# Patient Record
Sex: FEMALE | Race: WHITE | NOT HISPANIC OR LATINO | URBAN - METROPOLITAN AREA
[De-identification: names, ages, dates, MRNs, and addresses within clinical notes are randomized per-mention and may not be internally consistent; named-entity substitution may affect disease eponyms.]

---

## 2017-08-01 PROBLEM — Z00.00 ENCOUNTER FOR PREVENTIVE HEALTH EXAMINATION: Status: ACTIVE | Noted: 2017-08-01

## 2024-09-19 ENCOUNTER — INPATIENT (INPATIENT)
Facility: HOSPITAL | Age: 79
LOS: 5 days | Discharge: HOME | DRG: 690 | End: 2024-09-25
Attending: STUDENT IN AN ORGANIZED HEALTH CARE EDUCATION/TRAINING PROGRAM | Admitting: STUDENT IN AN ORGANIZED HEALTH CARE EDUCATION/TRAINING PROGRAM
Payer: MEDICARE

## 2024-09-19 VITALS
RESPIRATION RATE: 16 BRPM | HEART RATE: 95 BPM | HEIGHT: 65 IN | OXYGEN SATURATION: 98 % | TEMPERATURE: 100 F | WEIGHT: 194.01 LBS | SYSTOLIC BLOOD PRESSURE: 120 MMHG | DIASTOLIC BLOOD PRESSURE: 83 MMHG

## 2024-09-19 DIAGNOSIS — L98.9 DISORDER OF THE SKIN AND SUBCUTANEOUS TISSUE, UNSPECIFIED: ICD-10-CM

## 2024-09-19 DIAGNOSIS — M10.9 GOUT, UNSPECIFIED: ICD-10-CM

## 2024-09-19 DIAGNOSIS — E78.5 HYPERLIPIDEMIA, UNSPECIFIED: ICD-10-CM

## 2024-09-19 DIAGNOSIS — Z29.9 ENCOUNTER FOR PROPHYLACTIC MEASURES, UNSPECIFIED: ICD-10-CM

## 2024-09-19 DIAGNOSIS — Z79.899 OTHER LONG TERM (CURRENT) DRUG THERAPY: ICD-10-CM

## 2024-09-19 DIAGNOSIS — N39.0 URINARY TRACT INFECTION, SITE NOT SPECIFIED: ICD-10-CM

## 2024-09-19 DIAGNOSIS — I10 ESSENTIAL (PRIMARY) HYPERTENSION: ICD-10-CM

## 2024-09-19 DIAGNOSIS — A41.9 SEPSIS, UNSPECIFIED ORGANISM: ICD-10-CM

## 2024-09-19 DIAGNOSIS — E11.9 TYPE 2 DIABETES MELLITUS WITHOUT COMPLICATIONS: ICD-10-CM

## 2024-09-19 LAB
ALBUMIN SERPL ELPH-MCNC: 3.6 G/DL — SIGNIFICANT CHANGE UP (ref 3.3–5)
ALP SERPL-CCNC: 121 U/L — HIGH (ref 40–120)
ALT FLD-CCNC: 26 U/L — SIGNIFICANT CHANGE UP (ref 12–78)
ANION GAP SERPL CALC-SCNC: 8 MMOL/L — SIGNIFICANT CHANGE UP (ref 5–17)
APPEARANCE UR: CLEAR — SIGNIFICANT CHANGE UP
APTT BLD: 34.1 SEC — SIGNIFICANT CHANGE UP (ref 24.5–35.6)
AST SERPL-CCNC: 11 U/L — LOW (ref 15–37)
BASOPHILS # BLD AUTO: 0.05 K/UL — SIGNIFICANT CHANGE UP (ref 0–0.2)
BASOPHILS NFR BLD AUTO: 0.4 % — SIGNIFICANT CHANGE UP (ref 0–2)
BILIRUB SERPL-MCNC: 0.9 MG/DL — SIGNIFICANT CHANGE UP (ref 0.2–1.2)
BILIRUB UR-MCNC: NEGATIVE — SIGNIFICANT CHANGE UP
BUN SERPL-MCNC: 26 MG/DL — HIGH (ref 7–23)
CALCIUM SERPL-MCNC: 9.8 MG/DL — SIGNIFICANT CHANGE UP (ref 8.5–10.1)
CHLORIDE SERPL-SCNC: 104 MMOL/L — SIGNIFICANT CHANGE UP (ref 96–108)
CO2 SERPL-SCNC: 26 MMOL/L — SIGNIFICANT CHANGE UP (ref 22–31)
COLOR SPEC: YELLOW — SIGNIFICANT CHANGE UP
CREAT SERPL-MCNC: 1.4 MG/DL — HIGH (ref 0.5–1.3)
DIFF PNL FLD: NEGATIVE — SIGNIFICANT CHANGE UP
EGFR: 39 ML/MIN/1.73M2 — LOW
EOSINOPHIL # BLD AUTO: 0.03 K/UL — SIGNIFICANT CHANGE UP (ref 0–0.5)
EOSINOPHIL NFR BLD AUTO: 0.2 % — SIGNIFICANT CHANGE UP (ref 0–6)
GLUCOSE SERPL-MCNC: 210 MG/DL — HIGH (ref 70–99)
GLUCOSE UR QL: >=1000 MG/DL
HCT VFR BLD CALC: 37.4 % — SIGNIFICANT CHANGE UP (ref 34.5–45)
HGB BLD-MCNC: 11.8 G/DL — SIGNIFICANT CHANGE UP (ref 11.5–15.5)
IMM GRANULOCYTES NFR BLD AUTO: 0.6 % — SIGNIFICANT CHANGE UP (ref 0–0.9)
INR BLD: 1.04 RATIO — SIGNIFICANT CHANGE UP (ref 0.85–1.18)
KETONES UR-MCNC: NEGATIVE MG/DL — SIGNIFICANT CHANGE UP
LACTATE SERPL-SCNC: 1.3 MMOL/L — SIGNIFICANT CHANGE UP (ref 0.7–2)
LEUKOCYTE ESTERASE UR-ACNC: NEGATIVE — SIGNIFICANT CHANGE UP
LYMPHOCYTES # BLD AUTO: 0.91 K/UL — LOW (ref 1–3.3)
LYMPHOCYTES # BLD AUTO: 6.8 % — LOW (ref 13–44)
MCHC RBC-ENTMCNC: 27.3 PG — SIGNIFICANT CHANGE UP (ref 27–34)
MCHC RBC-ENTMCNC: 31.6 GM/DL — LOW (ref 32–36)
MCV RBC AUTO: 86.4 FL — SIGNIFICANT CHANGE UP (ref 80–100)
MONOCYTES # BLD AUTO: 1.31 K/UL — HIGH (ref 0–0.9)
MONOCYTES NFR BLD AUTO: 9.8 % — SIGNIFICANT CHANGE UP (ref 2–14)
NEUTROPHILS # BLD AUTO: 10.95 K/UL — HIGH (ref 1.8–7.4)
NEUTROPHILS NFR BLD AUTO: 82.2 % — HIGH (ref 43–77)
NITRITE UR-MCNC: POSITIVE
NRBC # BLD: 0 /100 WBCS — SIGNIFICANT CHANGE UP (ref 0–0)
PH UR: 5.5 — SIGNIFICANT CHANGE UP (ref 5–8)
PLATELET # BLD AUTO: 229 K/UL — SIGNIFICANT CHANGE UP (ref 150–400)
POTASSIUM SERPL-MCNC: 4.3 MMOL/L — SIGNIFICANT CHANGE UP (ref 3.5–5.3)
POTASSIUM SERPL-SCNC: 4.3 MMOL/L — SIGNIFICANT CHANGE UP (ref 3.5–5.3)
PROT SERPL-MCNC: 7.7 G/DL — SIGNIFICANT CHANGE UP (ref 6–8.3)
PROT UR-MCNC: 30 MG/DL
PROTHROM AB SERPL-ACNC: 11.8 SEC — SIGNIFICANT CHANGE UP (ref 9.5–13)
RAPID RVP RESULT: SIGNIFICANT CHANGE UP
RBC # BLD: 4.33 M/UL — SIGNIFICANT CHANGE UP (ref 3.8–5.2)
RBC # FLD: 15.7 % — HIGH (ref 10.3–14.5)
SARS-COV-2 RNA SPEC QL NAA+PROBE: SIGNIFICANT CHANGE UP
SODIUM SERPL-SCNC: 138 MMOL/L — SIGNIFICANT CHANGE UP (ref 135–145)
SP GR SPEC: 1.02 — SIGNIFICANT CHANGE UP (ref 1–1.03)
UROBILINOGEN FLD QL: 0.2 MG/DL — SIGNIFICANT CHANGE UP (ref 0.2–1)
WBC # BLD: 13.33 K/UL — HIGH (ref 3.8–10.5)
WBC # FLD AUTO: 13.33 K/UL — HIGH (ref 3.8–10.5)

## 2024-09-19 PROCEDURE — 99223 1ST HOSP IP/OBS HIGH 75: CPT | Mod: GC

## 2024-09-19 PROCEDURE — 71045 X-RAY EXAM CHEST 1 VIEW: CPT | Mod: 26

## 2024-09-19 PROCEDURE — 93010 ELECTROCARDIOGRAM REPORT: CPT

## 2024-09-19 RX ORDER — ALCOHOL ANTISEPTIC PADS
15 PADS, MEDICATED (EA) TOPICAL ONCE
Refills: 0 | Status: DISCONTINUED | OUTPATIENT
Start: 2024-09-19 | End: 2024-09-25

## 2024-09-19 RX ORDER — GLUCAGON INJECTION, SOLUTION 0.5 MG/.1ML
1 INJECTION, SOLUTION SUBCUTANEOUS ONCE
Refills: 0 | Status: DISCONTINUED | OUTPATIENT
Start: 2024-09-19 | End: 2024-09-25

## 2024-09-19 RX ORDER — PEDIATRIC MULTIVIT 61/D3/VIT K 1500-800
1 CAPSULE ORAL DAILY
Refills: 0 | Status: DISCONTINUED | OUTPATIENT
Start: 2024-09-19 | End: 2024-09-25

## 2024-09-19 RX ORDER — ALCOHOL ANTISEPTIC PADS
25 PADS, MEDICATED (EA) TOPICAL ONCE
Refills: 0 | Status: DISCONTINUED | OUTPATIENT
Start: 2024-09-19 | End: 2024-09-25

## 2024-09-19 RX ORDER — INSULIN GLARGINE 300 U/ML
8 INJECTION, SOLUTION SUBCUTANEOUS EVERY MORNING
Refills: 0 | Status: DISCONTINUED | OUTPATIENT
Start: 2024-09-19 | End: 2024-09-20

## 2024-09-19 RX ORDER — CEFTRIAXONE SODIUM 1 G
1000 VIAL (EA) INJECTION ONCE
Refills: 0 | Status: COMPLETED | OUTPATIENT
Start: 2024-09-19 | End: 2024-09-19

## 2024-09-19 RX ORDER — INSULIN LISPRO 100/ML
VIAL (ML) SUBCUTANEOUS
Refills: 0 | Status: DISCONTINUED | OUTPATIENT
Start: 2024-09-19 | End: 2024-09-25

## 2024-09-19 RX ORDER — INSULIN LISPRO 100/ML
VIAL (ML) SUBCUTANEOUS AT BEDTIME
Refills: 0 | Status: DISCONTINUED | OUTPATIENT
Start: 2024-09-19 | End: 2024-09-25

## 2024-09-19 RX ORDER — SODIUM CHLORIDE IRRIG SOLUTION 0.9 %
1000 SOLUTION, IRRIGATION IRRIGATION
Refills: 0 | Status: DISCONTINUED | OUTPATIENT
Start: 2024-09-19 | End: 2024-09-25

## 2024-09-19 RX ORDER — ATORVASTATIN CALCIUM 10 MG/1
40 TABLET, FILM COATED ORAL AT BEDTIME
Refills: 0 | Status: DISCONTINUED | OUTPATIENT
Start: 2024-09-19 | End: 2024-09-25

## 2024-09-19 RX ORDER — SODIUM CHLORIDE 0.9 % (FLUSH) 0.9 %
1000 SYRINGE (ML) INJECTION
Refills: 0 | Status: DISCONTINUED | OUTPATIENT
Start: 2024-09-19 | End: 2024-09-20

## 2024-09-19 RX ORDER — PREGABALIN 25 MG/1
75 CAPSULE ORAL EVERY 12 HOURS
Refills: 0 | Status: DISCONTINUED | OUTPATIENT
Start: 2024-09-19 | End: 2024-09-25

## 2024-09-19 RX ORDER — SODIUM CHLORIDE 0.9 % (FLUSH) 0.9 %
1750 SYRINGE (ML) INJECTION ONCE
Refills: 0 | Status: COMPLETED | OUTPATIENT
Start: 2024-09-19 | End: 2024-09-19

## 2024-09-19 RX ORDER — CEFTRIAXONE SODIUM 1 G
1000 VIAL (EA) INJECTION EVERY 24 HOURS
Refills: 0 | Status: DISCONTINUED | OUTPATIENT
Start: 2024-09-20 | End: 2024-09-20

## 2024-09-19 RX ORDER — ALCOHOL ANTISEPTIC PADS
12.5 PADS, MEDICATED (EA) TOPICAL ONCE
Refills: 0 | Status: DISCONTINUED | OUTPATIENT
Start: 2024-09-19 | End: 2024-09-25

## 2024-09-19 RX ORDER — SODIUM CHLORIDE 0.9 % (FLUSH) 0.9 %
1000 SYRINGE (ML) INJECTION ONCE
Refills: 0 | Status: COMPLETED | OUTPATIENT
Start: 2024-09-19 | End: 2024-09-19

## 2024-09-19 RX ORDER — ACETAMINOPHEN 325 MG
1000 TABLET ORAL ONCE
Refills: 0 | Status: COMPLETED | OUTPATIENT
Start: 2024-09-19 | End: 2024-09-19

## 2024-09-19 RX ORDER — ACETAMINOPHEN 325 MG
650 TABLET ORAL EVERY 6 HOURS
Refills: 0 | Status: DISCONTINUED | OUTPATIENT
Start: 2024-09-19 | End: 2024-09-25

## 2024-09-19 RX ORDER — ASPIRIN 325 MG
81 TABLET ORAL DAILY
Refills: 0 | Status: DISCONTINUED | OUTPATIENT
Start: 2024-09-19 | End: 2024-09-25

## 2024-09-19 RX ORDER — 5-HYDROXYTRYPTOPHAN (5-HTP) 100 MG
3 TABLET,DISINTEGRATING ORAL AT BEDTIME
Refills: 0 | Status: DISCONTINUED | OUTPATIENT
Start: 2024-09-19 | End: 2024-09-25

## 2024-09-19 RX ADMIN — Medication 400 MILLIGRAM(S): at 19:35

## 2024-09-19 RX ADMIN — Medication 1000 MILLIGRAM(S): at 20:30

## 2024-09-19 RX ADMIN — Medication 1000 MILLILITER(S): at 22:00

## 2024-09-19 RX ADMIN — Medication 100 MILLIGRAM(S): at 20:06

## 2024-09-19 RX ADMIN — Medication 1750 MILLILITER(S): at 19:35

## 2024-09-19 NOTE — H&P ADULT - NSICDXPASTMEDICALHX_GEN_ALL_CORE_FT
PAST MEDICAL HISTORY:  Benign essential HTN     DM (diabetes mellitus)     Gout     H/O Bell's palsy     HLD (hyperlipidemia)     Psoriasis

## 2024-09-19 NOTE — H&P ADULT - NSHPREVIEWOFSYSTEMS_GEN_ALL_CORE
CONSTITUTIONAL: endorses fever, weakness and fatigue  CARDIOVASCULAR: denies chest pain, chest pressure, palpitations  RESPIRATORY: denies shortness of breath, sputum production  GASTROINTESTINAL: denies nausea, vomiting, diarrhea, abdominal pain  GENITOURINARY: denies dysuria, discharge  NEUROLOGICAL: denies numbness, headache, focal weakness  MUSCULOSKELETAL: denies new joint pain, muscle aches

## 2024-09-19 NOTE — H&P ADULT - HISTORY OF PRESENT ILLNESS
78 F hx IDDM, HTN, HLD, presents due to generalized weakness, fever. Pt was feeling weak/tired, fell asleep in her car, in the heat. Throughout day, she was unable to do her daily tasks secondary to the fatigue. 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 This level of fatigue worried family and prompted them to bring her in to ED. In ED, she was found to be febrile to 102. She states she sometimes feels fatigue after taking her home medications which include many diabetic drugs; however, she has not had any changes in medication in recent time besides a monthly injection for her psoriasis.    In ED 78 F hx IDDM, HTN, HLD, presents due to generalized weakness, fever. Pt was feeling weak/tired, fell asleep in her car, in the heat for over 2 hours. Throughout day, she was unable to do her daily tasks secondary to the fatigue. 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 This level of fatigue worried family and prompted them to bring her in to ED. In ED, she was found to be febrile to 102. She states she sometimes feels fatigue after taking her home medications which include many diabetic drugs; however, she has not had any changes in medication in recent time besides a monthly injection for her psoriasis.    In ED was given one dose of ceftriaxone for a positive UA (nitrites, Too numerous bacteria to count though squamous epithelial cells were present and WBC of 25). RVP was negative. Elevated WBC count of 13.33 with Neutrophil % of 82.2. Septic LEO was done

## 2024-09-19 NOTE — H&P ADULT - PROBLEM SELECTOR PLAN 8
on Bimzelx 1x a month given to her by her dermatologist  patient can follow up outpatient with derm for next injection

## 2024-09-19 NOTE — ED ADULT NURSE REASSESSMENT NOTE - NS ED NURSE REASSESS COMMENT FT1
pt received alert. pt resting comfortably in stretcher. Family and MD at bedside. No c/o pain or discomfort. NAD noted. Safety maintained.

## 2024-09-19 NOTE — H&P ADULT - NSHPPHYSICALEXAM_GEN_ALL_CORE
T(C): 40.6 (09-20-24 @ 02:40), Max: 40.6 (09-20-24 @ 02:40)  HR: 110 (09-20-24 @ 02:40) (80 - 110)  BP: 117/68 (09-20-24 @ 02:40) (94/59 - 135/62)  RR: 17 (09-20-24 @ 02:40) (16 - 18)  SpO2: 94% (09-20-24 @ 02:40) (94% - 98%)    GENERAL: patient appears well, appropriate, asymmetrial facial muscle tone and asymmetric smile  ENMT: oropharynx clear without erythema, no exudates, moist mucous membranes  LUNGS: good air entry bilaterally, clear to auscultation, symmetric breath sounds, no wheezing or rhonchi appreciated  HEART: soft S1/S2, regular rate and rhythm, no murmurs noted, no lower extremity edema  GASTROINTESTINAL: abdomen is soft, nontender, nondistended, normoactive bowel sounds, no palpable masses  INTEGUMENT: good skin turgor, warm skin, appears well perfused, blister on L ankle, healing blister on R foot  MUSCULOSKELETAL: no clubbing or cyanosis, no obvious deformity  NEUROLOGIC: awake, alert, oriented x3, good muscle tone in 4 extremities, dec sensory sensation in bilateral feet  PSYCHIATRIC: mood is good, affect is congruent, linear and logical thought process

## 2024-09-19 NOTE — H&P ADULT - PROBLEM SELECTOR PLAN 2
follows with Dr. Sheryl Wyatt  on Mounjaro 10 mg weekly  on Jardiance 25 mg daily  on Metformin HCL  mg 4tablets in PM?  on Glimepiride 2 mg daily  on Toujeo 12 units daily  states last A1C was in the 8's range    plan  hold oral diabetic regimen  start long acting glargine 8 units + Low dose ISS  a1c will be sent in AM

## 2024-09-19 NOTE — H&P ADULT - PROBLEM SELECTOR PLAN 3
unsure Metformin dosage frequency?  unsure Triamterene dose-> not clear on med rec patient's daughter had at bedside pls confirm with patient's daughter or pharmacy

## 2024-09-19 NOTE — H&P ADULT - PROBLEM SELECTOR PLAN 4
on on lisinopril 20, Metoprolol Succ 50 mg, Triamterene unknown dose?    will hold antihypertensives given sepsis (hypotensive) picture

## 2024-09-19 NOTE — H&P ADULT - PROBLEM SELECTOR PLAN 5
No previous records seen on KAM  daughter states Cr is at her baseline, but GFR is lower than usual at 39    plan  obtain outpatient records  trend Cr/ GFR  if abnormal from baseline consider Nephro consult while inpatient

## 2024-09-19 NOTE — H&P ADULT - PROBLEM SELECTOR PLAN 6
continue atorvastatin 40  on vascepa 1 gram daily-> nonformulary can ask patient's family to bring in  AM lipid panel

## 2024-09-19 NOTE — ED ADULT NURSE NOTE - OBJECTIVE STATEMENT
Pt BIBA from home c/o weakness. Pt A&Ox4. Per pt she fell asleep in the car around 1400 and was found in the car around 1700. Pt states she feels weak. Pt states she has a bad habit of falling asleep in the car. EKG done at bedside, BG done at bedside. Pt noted to be febrile. Pt denies any chest pain, SOB, n/v/d at this time. Blisters noted on b/l feet with band aids covering them. Pt placed on bedside cardiac monitoring along with continuousO2 monitoring. Pt resting in stretcher with family at bedside.

## 2024-09-19 NOTE — H&P ADULT - PROBLEM SELECTOR PLAN 1
Meets SIRS criteria with WBC count >12k, Temp of 102, and suspected source (UTI), and hypotension  Septic LEO collected-> blood cx x2, Urine analysis w/ reflex cx, CXR  RVP negative  Lactic acid wnl  Aggressive fluid resuscitation-> 3L bolused, and maintenance fluid started at  70cc/hr  on Jardiance for DM-> inc risk of UTI    plan  continue Ceftriaxone-> follow urine cx  tylenol for pain/fever  ID consult Meets SIRS criteria with WBC count >12k, Temp of 102, and suspected source (UTI), and hypotension  Septic LEO collected-> blood cx x2, Urine analysis w/ reflex cx, CXR  RVP negative  Lactic acid wnl  Aggressive fluid resuscitation-> 3L bolused, and maintenance fluid started at  70cc/hr  on Jardiance for DM-> inc risk of UTI    plan  continue Ceftriaxone-> follow urine cx  tylenol for pain/fever  ID consult- Dr. Rodriguez

## 2024-09-19 NOTE — ED ADULT NURSE NOTE - NSFALLHARMRISKINTERV_ED_ALL_ED

## 2024-09-19 NOTE — ED PROVIDER NOTE - CLINICAL SUMMARY MEDICAL DECISION MAKING FREE TEXT BOX
Patient is a 78-year-old with history of diabetes insulin requiring who today for unexplained reason was feeling suddenly weak fatigued somewhat sleepy attempting to get out of her car ultimately falling back asleep for approximately 3 hours when found by neighbor.  Patient denies any loss of consciousness.  She denies feeling ill in any way including no cough chest pain abdominal pain back pain fevers chills nausea vomiting diarrhea or dysuria.  Blood sugar 30 minutes to 60 minutes prior to event was close to 300 that diminished acutely to 150 immediately prior to patient falling asleep. This case will require independently performed history and physical by MD as well as labs CBC chemistry blood cultures lactate as well as CT of the head and EKG and IV fluids.

## 2024-09-19 NOTE — ED PROVIDER NOTE - DIFFERENTIAL DIAGNOSIS
Differential Diagnosis Alteration in mental status rule out hypoglycemia versus relative hypoglycemia versus infectious etiology such as sepsis versus dehydration versus TIA doubt.

## 2024-09-19 NOTE — H&P ADULT - PROBLEM SELECTOR PLAN 7
on febuxostat 40 mg daily-> nonformulary  will send uric acid level for AM  please ask patient's family to bring it as it is a nonformulary

## 2024-09-19 NOTE — ED PROVIDER NOTE - OBJECTIVE STATEMENT
78 F hx IDDM, HTN, HLD, presents due to generalized weakness, fever. Pt was feeling weak/tired, fell asleep in her car, in the heat. Found to be febrile in ED. Pt states she has been in usual state of health. Glucose fluctuating recently but no extremely high or low numbers. Recently treated for right heel infection with cephalosporin. Developed URI symptoms and was told this would treat possible pneumonia as well. Denies cp, sob, abd pain, vomiting, diarrhea, urinary complaints.    pmd julieth adhikari

## 2024-09-19 NOTE — ED PROVIDER NOTE - ATTENDING APP SHARED VISIT CONTRIBUTION OF CARE
Semination reveals a well-developed well-nourished white female slightly flushed appearing in no acute distress lungs clear heart regular rate and rhythm without any murmurs rubs gallops abdomen is protuberant soft completely nontender.  Extremities are without clubbing cyanosis or edema.  There is a unroofed blister in the left distal medial leg without any warmth erythema or tenderness.  Neurologically patient's alert oriented x 4 without any focal neurologic deficits.  I agree with plan management outlined by PA.

## 2024-09-20 DIAGNOSIS — E11.65 TYPE 2 DIABETES MELLITUS WITH HYPERGLYCEMIA: ICD-10-CM

## 2024-09-20 LAB
A1C WITH ESTIMATED AVERAGE GLUCOSE RESULT: 7.5 % — HIGH (ref 4–5.6)
ALBUMIN SERPL ELPH-MCNC: 2.7 G/DL — LOW (ref 3.3–5)
ALP SERPL-CCNC: 86 U/L — SIGNIFICANT CHANGE UP (ref 40–120)
ALT FLD-CCNC: 20 U/L — SIGNIFICANT CHANGE UP (ref 12–78)
ANION GAP SERPL CALC-SCNC: 7 MMOL/L — SIGNIFICANT CHANGE UP (ref 5–17)
AST SERPL-CCNC: 17 U/L — SIGNIFICANT CHANGE UP (ref 15–37)
BASOPHILS # BLD AUTO: 0.04 K/UL — SIGNIFICANT CHANGE UP (ref 0–0.2)
BASOPHILS NFR BLD AUTO: 0.2 % — SIGNIFICANT CHANGE UP (ref 0–2)
BILIRUB SERPL-MCNC: 0.7 MG/DL — SIGNIFICANT CHANGE UP (ref 0.2–1.2)
BUN SERPL-MCNC: 28 MG/DL — HIGH (ref 7–23)
CALCIUM SERPL-MCNC: 8.1 MG/DL — LOW (ref 8.5–10.1)
CHLORIDE SERPL-SCNC: 108 MMOL/L — SIGNIFICANT CHANGE UP (ref 96–108)
CHOLEST SERPL-MCNC: 109 MG/DL — SIGNIFICANT CHANGE UP
CO2 SERPL-SCNC: 25 MMOL/L — SIGNIFICANT CHANGE UP (ref 22–31)
CREAT SERPL-MCNC: 1.3 MG/DL — SIGNIFICANT CHANGE UP (ref 0.5–1.3)
EGFR: 42 ML/MIN/1.73M2 — LOW
EOSINOPHIL # BLD AUTO: 0 K/UL — SIGNIFICANT CHANGE UP (ref 0–0.5)
EOSINOPHIL NFR BLD AUTO: 0 % — SIGNIFICANT CHANGE UP (ref 0–6)
ESTIMATED AVERAGE GLUCOSE: 169 MG/DL — HIGH (ref 68–114)
GLUCOSE SERPL-MCNC: 269 MG/DL — HIGH (ref 70–99)
HCT VFR BLD CALC: 30.9 % — LOW (ref 34.5–45)
HDLC SERPL-MCNC: 35 MG/DL — LOW
HGB BLD-MCNC: 10.1 G/DL — LOW (ref 11.5–15.5)
IMM GRANULOCYTES NFR BLD AUTO: 0.5 % — SIGNIFICANT CHANGE UP (ref 0–0.9)
LACTATE SERPL-SCNC: 2.2 MMOL/L — HIGH (ref 0.7–2)
LACTATE SERPL-SCNC: 2.4 MMOL/L — HIGH (ref 0.7–2)
LIPID PNL WITH DIRECT LDL SERPL: 47 MG/DL — SIGNIFICANT CHANGE UP
LYMPHOCYTES # BLD AUTO: 0.83 K/UL — LOW (ref 1–3.3)
LYMPHOCYTES # BLD AUTO: 5 % — LOW (ref 13–44)
MCHC RBC-ENTMCNC: 28.5 PG — SIGNIFICANT CHANGE UP (ref 27–34)
MCHC RBC-ENTMCNC: 32.7 GM/DL — SIGNIFICANT CHANGE UP (ref 32–36)
MCV RBC AUTO: 87.3 FL — SIGNIFICANT CHANGE UP (ref 80–100)
MONOCYTES # BLD AUTO: 1.14 K/UL — HIGH (ref 0–0.9)
MONOCYTES NFR BLD AUTO: 6.9 % — SIGNIFICANT CHANGE UP (ref 2–14)
NEUTROPHILS # BLD AUTO: 14.54 K/UL — HIGH (ref 1.8–7.4)
NEUTROPHILS NFR BLD AUTO: 87.4 % — HIGH (ref 43–77)
NON HDL CHOLESTEROL: 75 MG/DL — SIGNIFICANT CHANGE UP
NRBC # BLD: 0 /100 WBCS — SIGNIFICANT CHANGE UP (ref 0–0)
PLATELET # BLD AUTO: 185 K/UL — SIGNIFICANT CHANGE UP (ref 150–400)
POTASSIUM SERPL-MCNC: 3.5 MMOL/L — SIGNIFICANT CHANGE UP (ref 3.5–5.3)
POTASSIUM SERPL-SCNC: 3.5 MMOL/L — SIGNIFICANT CHANGE UP (ref 3.5–5.3)
PROT SERPL-MCNC: 5.8 G/DL — LOW (ref 6–8.3)
RBC # BLD: 3.54 M/UL — LOW (ref 3.8–5.2)
RBC # FLD: 15.9 % — HIGH (ref 10.3–14.5)
SODIUM SERPL-SCNC: 140 MMOL/L — SIGNIFICANT CHANGE UP (ref 135–145)
TRIGL SERPL-MCNC: 165 MG/DL — HIGH
WBC # BLD: 16.64 K/UL — HIGH (ref 3.8–10.5)
WBC # FLD AUTO: 16.64 K/UL — HIGH (ref 3.8–10.5)

## 2024-09-20 PROCEDURE — 99223 1ST HOSP IP/OBS HIGH 75: CPT

## 2024-09-20 PROCEDURE — 99221 1ST HOSP IP/OBS SF/LOW 40: CPT

## 2024-09-20 PROCEDURE — 99233 SBSQ HOSP IP/OBS HIGH 50: CPT

## 2024-09-20 PROCEDURE — 99291 CRITICAL CARE FIRST HOUR: CPT

## 2024-09-20 PROCEDURE — 76604 US EXAM CHEST: CPT | Mod: 26

## 2024-09-20 PROCEDURE — 93308 TTE F-UP OR LMTD: CPT | Mod: 26

## 2024-09-20 RX ORDER — SODIUM CHLORIDE 0.9 % (FLUSH) 0.9 %
500 SYRINGE (ML) INJECTION ONCE
Refills: 0 | Status: COMPLETED | OUTPATIENT
Start: 2024-09-20 | End: 2024-09-20

## 2024-09-20 RX ORDER — PIPERACILLIN SODIUM AND TAZOBACTAM SODIUM 12; 1.5 G/60ML; G/60ML
3.38 INJECTION, POWDER, LYOPHILIZED, FOR SOLUTION INTRAVENOUS ONCE
Refills: 0 | Status: COMPLETED | OUTPATIENT
Start: 2024-09-20 | End: 2024-09-20

## 2024-09-20 RX ORDER — PIPERACILLIN SODIUM AND TAZOBACTAM SODIUM 12; 1.5 G/60ML; G/60ML
3.38 INJECTION, POWDER, LYOPHILIZED, FOR SOLUTION INTRAVENOUS EVERY 8 HOURS
Refills: 0 | Status: DISCONTINUED | OUTPATIENT
Start: 2024-09-20 | End: 2024-09-22

## 2024-09-20 RX ORDER — MIDODRINE HYDROCHLORIDE 5 MG/1
5 TABLET ORAL THREE TIMES A DAY
Refills: 0 | Status: DISCONTINUED | OUTPATIENT
Start: 2024-09-20 | End: 2024-09-20

## 2024-09-20 RX ORDER — ACETAMINOPHEN 325 MG
1000 TABLET ORAL ONCE
Refills: 0 | Status: COMPLETED | OUTPATIENT
Start: 2024-09-20 | End: 2024-09-20

## 2024-09-20 RX ORDER — VANCOMYCIN HCL-SODIUM CHLORIDE IV SOLN 1.5 GM/250ML-0.9% 1.5-0.9/25 GM/ML-%
1250 SOLUTION INTRAVENOUS EVERY 24 HOURS
Refills: 0 | Status: DISCONTINUED | OUTPATIENT
Start: 2024-09-20 | End: 2024-09-21

## 2024-09-20 RX ORDER — SODIUM CHLORIDE 0.9 % (FLUSH) 0.9 %
1000 SYRINGE (ML) INJECTION
Refills: 0 | Status: DISCONTINUED | OUTPATIENT
Start: 2024-09-20 | End: 2024-09-21

## 2024-09-20 RX ORDER — NOREPINEPHRINE BITARTRATE/D5W 16MG/250ML
0.05 PLASTIC BAG, INJECTION (ML) INTRAVENOUS
Qty: 8 | Refills: 0 | Status: DISCONTINUED | OUTPATIENT
Start: 2024-09-20 | End: 2024-09-21

## 2024-09-20 RX ORDER — INSULIN GLARGINE 300 U/ML
12 INJECTION, SOLUTION SUBCUTANEOUS EVERY MORNING
Refills: 0 | Status: DISCONTINUED | OUTPATIENT
Start: 2024-09-20 | End: 2024-09-25

## 2024-09-20 RX ORDER — MIDODRINE HYDROCHLORIDE 5 MG/1
10 TABLET ORAL THREE TIMES A DAY
Refills: 0 | Status: DISCONTINUED | OUTPATIENT
Start: 2024-09-20 | End: 2024-09-21

## 2024-09-20 RX ADMIN — Medication 500 MILLILITER(S): at 13:35

## 2024-09-20 RX ADMIN — Medication 650 MILLIGRAM(S): at 21:24

## 2024-09-20 RX ADMIN — Medication 1 TABLET(S): at 13:04

## 2024-09-20 RX ADMIN — Medication 500 MILLILITER(S): at 11:54

## 2024-09-20 RX ADMIN — INSULIN GLARGINE 8 UNIT(S): 300 INJECTION, SOLUTION SUBCUTANEOUS at 13:02

## 2024-09-20 RX ADMIN — VANCOMYCIN HCL-SODIUM CHLORIDE IV SOLN 1.5 GM/250ML-0.9% 166.67 MILLIGRAM(S): 1.5-0.9/25 SOLUTION at 04:42

## 2024-09-20 RX ADMIN — Medication 100 MILLILITER(S): at 22:43

## 2024-09-20 RX ADMIN — Medication 70 MILLILITER(S): at 11:00

## 2024-09-20 RX ADMIN — Medication 70 MILLILITER(S): at 00:10

## 2024-09-20 RX ADMIN — PIPERACILLIN SODIUM AND TAZOBACTAM SODIUM 25 GRAM(S): 12; 1.5 INJECTION, POWDER, LYOPHILIZED, FOR SOLUTION INTRAVENOUS at 13:07

## 2024-09-20 RX ADMIN — Medication 50 MILLILITER(S): at 22:44

## 2024-09-20 RX ADMIN — Medication 2: at 17:38

## 2024-09-20 RX ADMIN — PREGABALIN 75 MILLIGRAM(S): 25 CAPSULE ORAL at 17:37

## 2024-09-20 RX ADMIN — Medication 4: at 13:03

## 2024-09-20 RX ADMIN — Medication 100 MILLILITER(S): at 13:36

## 2024-09-20 RX ADMIN — Medication 500 MILLILITER(S): at 14:18

## 2024-09-20 RX ADMIN — MIDODRINE HYDROCHLORIDE 10 MILLIGRAM(S): 5 TABLET ORAL at 17:38

## 2024-09-20 RX ADMIN — PIPERACILLIN SODIUM AND TAZOBACTAM SODIUM 25 GRAM(S): 12; 1.5 INJECTION, POWDER, LYOPHILIZED, FOR SOLUTION INTRAVENOUS at 21:24

## 2024-09-20 RX ADMIN — PREGABALIN 75 MILLIGRAM(S): 25 CAPSULE ORAL at 06:09

## 2024-09-20 RX ADMIN — PIPERACILLIN SODIUM AND TAZOBACTAM SODIUM 200 GRAM(S): 12; 1.5 INJECTION, POWDER, LYOPHILIZED, FOR SOLUTION INTRAVENOUS at 03:58

## 2024-09-20 RX ADMIN — Medication 5000 UNIT(S): at 06:09

## 2024-09-20 RX ADMIN — Medication 400 MILLIGRAM(S): at 03:38

## 2024-09-20 RX ADMIN — MIDODRINE HYDROCHLORIDE 5 MILLIGRAM(S): 5 TABLET ORAL at 14:30

## 2024-09-20 RX ADMIN — Medication 5000 UNIT(S): at 17:38

## 2024-09-20 RX ADMIN — Medication 81 MILLIGRAM(S): at 13:04

## 2024-09-20 RX ADMIN — ATORVASTATIN CALCIUM 40 MILLIGRAM(S): 10 TABLET, FILM COATED ORAL at 21:24

## 2024-09-20 RX ADMIN — PIPERACILLIN SODIUM AND TAZOBACTAM SODIUM 25 GRAM(S): 12; 1.5 INJECTION, POWDER, LYOPHILIZED, FOR SOLUTION INTRAVENOUS at 06:54

## 2024-09-20 RX ADMIN — Medication 3: at 09:12

## 2024-09-20 NOTE — PATIENT PROFILE ADULT - FALL HARM RISK - HARM RISK INTERVENTIONS
Communicate Risk of Fall with Harm to all staff/Reinforce activity limits and safety measures with patient and family/Tailored Fall Risk Interventions/Visual Cue: Yellow wristband and red socks/Bed in lowest position, wheels locked, appropriate side rails in place/Call bell, personal items and telephone in reach/Instruct patient to call for assistance before getting out of bed or chair/Non-slip footwear when patient is out of bed/Oral to call system/Physically safe environment - no spills, clutter or unnecessary equipment/Purposeful Proactive Rounding/Room/bathroom lighting operational, light cord in reach Assistance with ambulation/Assistance OOB with selected safe patient handling equipment/Communicate Risk of Fall with Harm to all staff/Reinforce activity limits and safety measures with patient and family/Tailored Fall Risk Interventions/Visual Cue: Yellow wristband and red socks/Bed in lowest position, wheels locked, appropriate side rails in place/Call bell, personal items and telephone in reach/Instruct patient to call for assistance before getting out of bed or chair/Non-slip footwear when patient is out of bed/[place] to call system/Physically safe environment - no spills, clutter or unnecessary equipment/Purposeful Proactive Rounding/Room/bathroom lighting operational, light cord in reach

## 2024-09-20 NOTE — CONSULT NOTE ADULT - TIME BILLING
Thank you for your consult.   Please call us with any concerns or questions.   We will continue to follow.     Abrahan Rogers MD   Division of Infectious Diseases  [facility]   Cell [phone] between 8am and 6pm   After 6pm and weekends please call ID service at [phone].

## 2024-09-20 NOTE — CONSULT NOTE ADULT - CRITICAL CARE ATTENDING COMMENT
78F h/o type 2 DM on insulin, HTN, HLD, psoriasis initially presenting with weakness and lethargy, now a/w sepsis 2/2 UTI. Today noted to have worsening fevers with Tm 105F and hypotensive to 70s systolic with minimal response to 1L fluid bolus. Pt transferred to ICU for septic shock 2/2 UTI    Acute Problems  - septic shock  - UTI vs pyelonephritis  - ELDER    Plan  Neuro: awake and alert, pain control as needed  CV: severe sepsis/septic shock with hypotension to systolics in 70s on floor, now hovering in low 90s - POCUS not suggestive that patient would be volume responsive, continue midodrine 10mg q8rh and is needed can start levophed for goal MAP > 65  PULM: no issues  GI: diet as tolerated  RENAL: ELDER likely ATN from sepsis, per family pt has "no kidney problems" but do not recall actual sCr  - continue to trend renal indices and strict I/Os  - lactate elevation likey in setting of hypotension, trend to peak   ID: continue zosyn pending culture data  - follow-up blood cultures sent on admission  - if fever curve does not improve may require CT to further assess for pyelo vs renal abscess  - check MRSA swab  ENDO: continue Lantus and FS premeal and qHS with ISS coverage  HEME: DVT ppx with HSQ

## 2024-09-20 NOTE — CHART NOTE - NSCHARTNOTEFT_GEN_A_CORE
RN reported patient is hypotensive with c/o lightheadedness. Patient seen and examined. Sitting upright in chair. Family at bedside.   Vitals: 74/50 after 500ml NS bolus 92 /54 T: 98.5 HR: 84   - S/p NS 1000ml Bolus   - Continue maintenance IVF, will increase rate to 100ml per hour  -Continue antibiotics  -ICU consulted as well. RN reported patient is hypotensive with c/o lightheadedness. Patient seen and examined. Sitting upright in chair. Family at bedside.   Vitals: 74/50 after 500ml NS bolus 92 /54 T: 98.5 HR: 84   - S/p NS 1000ml Bolus   - Continue maintenance IVF, will increase rate to 100ml per hour  -Continue antibiotics  -ICU consulted as well. D/w the ICU PA, patient will most likely need to go to ICU due to persistently low BP  -Added Midodrine

## 2024-09-20 NOTE — CONSULT NOTE ADULT - SUBJECTIVE AND OBJECTIVE BOX
HORACE PERKINS  MRN-[id number]  Patient is a 78y old  Female who presents with a chief complaint of increased fatigue,  high temperature (20 Sep 2024 09:50)    HPI:  78 F hx IDDM, HTN, HLD, presents due to generalized weakness, fever. Pt was feeling weak/tired, fell asleep in her car, in the heat for over 2 hours. Throughout day, she was unable to do her daily tasks secondary to the fatigue. 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 This level of fatigue worried family and prompted them to bring her in to ED. In ED, she was found to be febrile to 102. She states she sometimes feels fatigue after taking her home medications which include many diabetic drugs; however, she has not had any changes in medication in recent time besides a monthly injection for her psoriasis.    In ED was given one dose of ceftriaxone for a positive UA (nitrites, Too numerous bacteria to count though squamous epithelial cells were present and WBC of 25). RVP was negative. Elevated WBC count of 13.33 with Neutrophil % of 82.2. Septic LEO was done (19 Sep 2024 22:55)      Events in last 24 hours:   ICU c/s called today for sepsis with hypotension 2/2 to UTI. Pt noted to have high fever this AM of 105F. Pt dropped her BP this AM to SBP 7's , pt received 1 L IVF bolus on floor and did BP still remains with SBP 80's   Pt was examined and assessed at the bedside and was noted to be awake alert and oriented sitting in the chair in NAD  Pt reports that she did not feel confused or lethargic, denies lightheadedness and dizziness.     Pt was given and additional bolus, SBP remains at 80    REVIEW OF SYSTEMS:    Gen: No fever  EYES/ENT: No visual changes;  No vertigo or throat pain   NECK: No pain   RES:  No shortness of breath or Cough  CARD: No chest pain   GI: No abdominal pain  : No dysuria  NEURO: No weakness  SKIN: No itching, rashes     Physical Exam:  Vital Signs Last 24 Hrs  T(C): 36.9 (20 Sep 2024 11:46), Max: 40.6 (20 Sep 2024 02:00)  T(F): 98.5 (20 Sep 2024 11:46), Max: 105.1 (20 Sep 2024 02:40)  HR: 84 (20 Sep 2024 11:55) (67 - 110)  BP: 80/40 (20 Sep 2024 14:03) (74/50 - 135/62)  BP(mean): --  RR: 14 (20 Sep 2024 11:26) (14 - 18)  SpO2: 94% (20 Sep 2024 11:26) (94% - 98%)    Parameters below as of 20 Sep 2024 11:26  Patient On (Oxygen Delivery Method): room air        Gen:  Awake, alert   CNS: non focal 	  Neck: no JVD  RES : clear , no wheezing                      CVS: Regular  rhythm. Normal S1/S2  Abd: Soft, non-distended. Bowel sounds present.  Skin: No rash.  Ext:  no edema    ============================I/O===========================   I&O's Detail    ============================ LABS =========================                        10.1   16.64 )-----------( 185      ( 20 Sep 2024 09:00 )             30.9     09-20    140  |  108  |  28[H]  ----------------------------<  269[H]  3.5   |  25  |  1.30    Ca    8.1[L]      20 Sep 2024 09:00    TPro  5.8[L]  /  Alb  2.7[L]  /  TBili  0.7  /  DBili  x   /  AST  17  /  ALT  20  /  AlkPhos  86  09-20    LIVER FUNCTIONS - ( 20 Sep 2024 09:00 )  Alb: 2.7 g/dL / Pro: 5.8 g/dL / ALK PHOS: 86 U/L / ALT: 20 U/L / AST: 17 U/L / GGT: x           PT/INR - ( 19 Sep 2024 18:45 )   PT: 11.8 sec;   INR: 1.04 ratio         PTT - ( 19 Sep 2024 18:45 )  PTT:34.1 sec    Urinalysis Basic - ( 20 Sep 2024 09:00 )    Color: x / Appearance: x / SG: x / pH: x  Gluc: 269 mg/dL / Ketone: x  / Bili: x / Urobili: x   Blood: x / Protein: x / Nitrite: x   Leuk Esterase: x / RBC: x / WBC x   Sq Epi: x / Non Sq Epi: x / Bacteria: x      ======================Micro/Rad/Cardio=================  Culture: Reviewed   CXR: Reviewed  Echo:Reviewed  ======================================================  PAST MEDICAL & SURGICAL HISTORY:  DM (diabetes mellitus)      Benign essential HTN      H/O Bell's palsy      Gout      HLD (hyperlipidemia)      Psoriasis      No significant past surgical history            ***This patient requires critical care for support of one or more vital organ systems with a high probability of imminent or life threatening deterioration in his/her condition    **This patient requires a high level of care due to the complexity and severity of their condition which increases the amount and complexity of data to be reviewed and analyzed in addition to the risk of complications, morbidity and mortality of patient management      *This patient requires a moderate level of care due to the complexity and severity of their condition which increases the amount and complexity of data to be reviewed and analyzed in addition to the risk of complications, morbidity and mortality of patient management      ====================ASSESSMENT ==============  1.   2.   3.   4.     Plan:  ====================== NEUROLOGY=====================  acetaminophen     Tablet .. 650 milliGRAM(s) Oral every 6 hours PRN Temp greater or equal to 38C (100.4F), Mild Pain (1 - 3)  melatonin 3 milliGRAM(s) Oral at bedtime PRN Insomnia  pregabalin 75 milliGRAM(s) Oral every 12 hours    ==================== RESPIRATORY======================  Mechanical Ventilation:      ====================CARDIOVASCULAR==================  midodrine. 5 milliGRAM(s) Oral three times a day  norepinephrine Infusion 0.05 MICROgram(s)/kG/Min (8.25 mL/Hr) IV Continuous <Continuous>    ===================HEMATOLOGIC/ONC ===================  aspirin enteric coated 81 milliGRAM(s) Oral daily  heparin   Injectable 5000 Unit(s) SubCutaneous every 12 hours    ===================== RENAL =========================  Continue monitoring urine output    ==================== GASTROINTESTINAL===================  dextrose 5%. 1000 milliLiter(s) (100 mL/Hr) IV Continuous <Continuous>  dextrose 5%. 1000 milliLiter(s) (50 mL/Hr) IV Continuous <Continuous>  multivitamin/minerals 1 Tablet(s) Oral daily  sodium chloride 0.9%. 1000 milliLiter(s) (100 mL/Hr) IV Continuous <Continuous>    =======================    ENDOCRINE  =====================  atorvastatin 40 milliGRAM(s) Oral at bedtime  dextrose 50% Injectable 12.5 Gram(s) IV Push once  dextrose 50% Injectable 25 Gram(s) IV Push once  dextrose 50% Injectable 25 Gram(s) IV Push once  dextrose Oral Gel 15 Gram(s) Oral once PRN Blood Glucose LESS THAN 70 milliGRAM(s)/deciliter  glucagon  Injectable 1 milliGRAM(s) IntraMuscular once  insulin glargine Injectable (LANTUS) 8 Unit(s) SubCutaneous every morning  insulin lispro (ADMELOG) corrective regimen sliding scale   SubCutaneous at bedtime  insulin lispro (ADMELOG) corrective regimen sliding scale   SubCutaneous three times a day before meals    ========================INFECTIOUS DISEASE================  piperacillin/tazobactam IVPB.. 3.375 Gram(s) IV Intermittent every 8 hours  vancomycin  IVPB 1250 milliGRAM(s) IV Intermittent every 24 hours      PLAN DISCUSSED IN DETAIL WITH ICU ATTENDING  _____ WHO IS AGREEABLE TO THE ABOVE PLAN     DATE OF ENTRY OF THIS NOTE IS EQUAL TO THE DATE OF SERVICES RENDERED ****    TOTAL CRITICAL CARE TIME: __/__ minutes (EXCLUSIVE of any non bundled procedures)    Critical Care time: 75 mins assessing presenting problems of acute illness that poses high probability of life threatening deterioration or end organ damage/dysfunction.  Medical decision making including Initiating plan of care, reviewing data, reviewing radiology, direct patient bedside evaluation and interpretation of vital signs, any necessary ventilator management , discussion with multidisciplinary team, discussing goals of care with patient/family, all non inclusive of procedures     Initial   Time spent on this patient encounter, which includes documenting this note in the electronic medical record, was 75 55 40 minutes including assessing the presenting problems with associated risks, reviewing medical records to prepare for the encounter, and meeting face to face with the patient to obtain additional history. I have also performed an appropriate physical exam, made interventions listed and ordered and interpreted appropriate diagnostic studies as documented. To improve communication and patient safety, I have coordinated care with the multidisciplinary team including the bedside nurse, appropriate attending of record and consultants as needed.    F/U  Time spent on this patient encounter, which includes documenting this note in the electronic medical record, was 50 35 25 minutes including assessing the presenting problems with associated risks, reviewing medical records to prepare for the encounter, and meeting face to face with the patient to obtain additional history. I have also performed an appropriate physical exam, made interventions listed and ordered and interpreted appropriate diagnostic studies as documented. To improve communication and patient safety, I have coordinated care with the multidisciplinary team including the bedside nurse, appropriate attending of record and consultants as needed.         HORACE PERKINS  MRN-[id number]  Patient is a 78y old  Female who presents with a chief complaint of increased fatigue,  high temperature (20 Sep 2024 09:50)    HPI:  78 F hx IDDM, HTN, HLD, presents due to generalized weakness, fever. Pt was feeling weak/tired, fell asleep in her car, in the heat for over 2 hours. Throughout day, she was unable to do her daily tasks secondary to the fatigue. 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 This level of fatigue worried family and prompted them to bring her in to ED. In ED, she was found to be febrile to 102. She states she sometimes feels fatigue after taking her home medications which include many diabetic drugs; however, she has not had any changes in medication in recent time besides a monthly injection for her psoriasis.    In ED was given one dose of ceftriaxone for a positive UA (nitrites, Too numerous bacteria to count though squamous epithelial cells were present and WBC of 25). RVP was negative. Elevated WBC count of 13.33 with Neutrophil % of 82.2. Septic LEO was done (19 Sep 2024 22:55)      Events in last 24 hours:   ICU c/s called today for sepsis with hypotension 2/2 to UTI. Pt noted to have high fever this AM of 105F. Pt dropped her BP this AM to SBP 7's , pt received 1 L IVF bolus on floor and did BP still remains with SBP 80's   Pt was examined and assessed at the bedside and was noted to be awake alert and oriented sitting in the chair in NAD  Pt reports that she did not feel confused or lethargic, denies lightheadedness and dizziness.     Pt was given and additional bolus, SBP remains at 80    REVIEW OF SYSTEMS:    Gen: No fever  EYES/ENT: No visual changes;  No vertigo or throat pain   NECK: No pain   RES:  No shortness of breath or Cough  CARD: No chest pain   GI: No abdominal pain  : No dysuria  NEURO: No weakness  SKIN: No itching, rashes     Physical Exam:  Vital Signs Last 24 Hrs  T(C): 36.9 (20 Sep 2024 11:46), Max: 40.6 (20 Sep 2024 02:00)  T(F): 98.5 (20 Sep 2024 11:46), Max: 105.1 (20 Sep 2024 02:40)  HR: 84 (20 Sep 2024 11:55) (67 - 110)  BP: 80/40 (20 Sep 2024 14:03) (74/50 - 135/62)  BP(mean): --  RR: 14 (20 Sep 2024 11:26) (14 - 18)  SpO2: 94% (20 Sep 2024 11:26) (94% - 98%)    Parameters below as of 20 Sep 2024 11:26  Patient On (Oxygen Delivery Method): room air        Gen:  Awake, alert   CNS: non focal 	  Neck: no JVD  RES : clear , no wheezing                      CVS: Regular  rhythm. Normal S1/S2  Abd: Soft, non-distended. Bowel sounds present.  Skin: No rash.  Ext:  no edema    ============================I/O===========================   I&O's Detail    ============================ LABS =========================                        10.1   16.64 )-----------( 185      ( 20 Sep 2024 09:00 )             30.9     09-20    140  |  108  |  28[H]  ----------------------------<  269[H]  3.5   |  25  |  1.30    Ca    8.1[L]      20 Sep 2024 09:00    TPro  5.8[L]  /  Alb  2.7[L]  /  TBili  0.7  /  DBili  x   /  AST  17  /  ALT  20  /  AlkPhos  86  09-20    LIVER FUNCTIONS - ( 20 Sep 2024 09:00 )  Alb: 2.7 g/dL / Pro: 5.8 g/dL / ALK PHOS: 86 U/L / ALT: 20 U/L / AST: 17 U/L / GGT: x           PT/INR - ( 19 Sep 2024 18:45 )   PT: 11.8 sec;   INR: 1.04 ratio         PTT - ( 19 Sep 2024 18:45 )  PTT:34.1 sec    Urinalysis Basic - ( 20 Sep 2024 09:00 )    Color: x / Appearance: x / SG: x / pH: x  Gluc: 269 mg/dL / Ketone: x  / Bili: x / Urobili: x   Blood: x / Protein: x / Nitrite: x   Leuk Esterase: x / RBC: x / WBC x   Sq Epi: x / Non Sq Epi: x / Bacteria: x      ======================Micro/Rad/Cardio=================  Culture: Urinalysis with Rflx Culture (09.19.24 @ 19:04)    Urine Appearance: Clear   Color: Yellow   Specific Gravity: 1.025   pH Urine: 5.5   Protein, Urine: 30 mg/dL   Glucose Qualitative, Urine: >=1000 mg/dL   Ketone - Urine: Negative mg/dL   Blood, Urine: Negative   Bilirubin: Negative   Urobilinogen: 0.2 mg/dL   Leukocyte Esterase Concentration: Negative   Nitrite: Positive      CXR: < from: Xray Chest 1 View- PORTABLE-Urgent (09.19.24 @ 19:07) >    IMPRESSION: No acute cardiopulmonary disease process.    --- End of Report ---    < end of copied text >      ======================================================  PAST MEDICAL & SURGICAL HISTORY:  DM (diabetes mellitus)      Benign essential HTN      H/O Bell's palsy      Gout      HLD (hyperlipidemia)      Psoriasis      No significant past surgical history            ***This patient requires critical care for support of one or more vital organ systems with a high probability of imminent or life threatening deterioration in his/her condition    **This patient requires a high level of care due to the complexity and severity of their condition which increases the amount and complexity of data to be reviewed and analyzed in addition to the risk of complications, morbidity and mortality of patient management      *This patient requires a moderate level of care due to the complexity and severity of their condition which increases the amount and complexity of data to be reviewed and analyzed in addition to the risk of complications, morbidity and mortality of patient management      ====================ASSESSMENT ==============  1.   2.   3.   4.     Plan:  ====================== NEUROLOGY=====================  acetaminophen     Tablet .. 650 milliGRAM(s) Oral every 6 hours PRN Temp greater or equal to 38C (100.4F), Mild Pain (1 - 3)  melatonin 3 milliGRAM(s) Oral at bedtime PRN Insomnia  pregabalin 75 milliGRAM(s) Oral every 12 hours    ==================== RESPIRATORY======================  Mechanical Ventilation:      ====================CARDIOVASCULAR==================  midodrine. 5 milliGRAM(s) Oral three times a day  norepinephrine Infusion 0.05 MICROgram(s)/kG/Min (8.25 mL/Hr) IV Continuous <Continuous>    ===================HEMATOLOGIC/ONC ===================  aspirin enteric coated 81 milliGRAM(s) Oral daily  heparin   Injectable 5000 Unit(s) SubCutaneous every 12 hours    ===================== RENAL =========================  Continue monitoring urine output    ==================== GASTROINTESTINAL===================  dextrose 5%. 1000 milliLiter(s) (100 mL/Hr) IV Continuous <Continuous>  dextrose 5%. 1000 milliLiter(s) (50 mL/Hr) IV Continuous <Continuous>  multivitamin/minerals 1 Tablet(s) Oral daily  sodium chloride 0.9%. 1000 milliLiter(s) (100 mL/Hr) IV Continuous <Continuous>    =======================    ENDOCRINE  =====================  atorvastatin 40 milliGRAM(s) Oral at bedtime  dextrose 50% Injectable 12.5 Gram(s) IV Push once  dextrose 50% Injectable 25 Gram(s) IV Push once  dextrose 50% Injectable 25 Gram(s) IV Push once  dextrose Oral Gel 15 Gram(s) Oral once PRN Blood Glucose LESS THAN 70 milliGRAM(s)/deciliter  glucagon  Injectable 1 milliGRAM(s) IntraMuscular once  insulin glargine Injectable (LANTUS) 8 Unit(s) SubCutaneous every morning  insulin lispro (ADMELOG) corrective regimen sliding scale   SubCutaneous at bedtime  insulin lispro (ADMELOG) corrective regimen sliding scale   SubCutaneous three times a day before meals    ========================INFECTIOUS DISEASE================  piperacillin/tazobactam IVPB.. 3.375 Gram(s) IV Intermittent every 8 hours  vancomycin  IVPB 1250 milliGRAM(s) IV Intermittent every 24 hours      PLAN DISCUSSED IN DETAIL WITH ICU ATTENDING  _____ WHO IS AGREEABLE TO THE ABOVE PLAN     DATE OF ENTRY OF THIS NOTE IS EQUAL TO THE DATE OF SERVICES RENDERED ****    TOTAL CRITICAL CARE TIME: __/__ minutes (EXCLUSIVE of any non bundled procedures)    Critical Care time: 75 mins assessing presenting problems of acute illness that poses high probability of life threatening deterioration or end organ damage/dysfunction.  Medical decision making including Initiating plan of care, reviewing data, reviewing radiology, direct patient bedside evaluation and interpretation of vital signs, any necessary ventilator management , discussion with multidisciplinary team, discussing goals of care with patient/family, all non inclusive of procedures     Initial   Time spent on this patient encounter, which includes documenting this note in the electronic medical record, was 75 55 40 minutes including assessing the presenting problems with associated risks, reviewing medical records to prepare for the encounter, and meeting face to face with the patient to obtain additional history. I have also performed an appropriate physical exam, made interventions listed and ordered and interpreted appropriate diagnostic studies as documented. To improve communication and patient safety, I have coordinated care with the multidisciplinary team including the bedside nurse, appropriate attending of record and consultants as needed.    F/U  Time spent on this patient encounter, which includes documenting this note in the electronic medical record, was 50 35 25 minutes including assessing the presenting problems with associated risks, reviewing medical records to prepare for the encounter, and meeting face to face with the patient to obtain additional history. I have also performed an appropriate physical exam, made interventions listed and ordered and interpreted appropriate diagnostic studies as documented. To improve communication and patient safety, I have coordinated care with the multidisciplinary team including the bedside nurse, appropriate attending of record and consultants as needed.         HORACE PERKINS  MRN-[id number]  Patient is a 78y old  Female who presents with a chief complaint of increased fatigue,  high temperature (20 Sep 2024 09:50)    HPI:  78 F hx IDDM, HTN, HLD, presents due to generalized weakness, fever. Pt was feeling weak/tired, fell asleep in her car, in the heat for over 2 hours. Throughout day, she was unable to do her daily tasks secondary to the fatigue. 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 This level of fatigue worried family and prompted them to bring her in to ED. In ED, she was found to be febrile to 102. She states she sometimes feels fatigue after taking her home medications which include many diabetic drugs; however, she has not had any changes in medication in recent time besides a monthly injection for her psoriasis.    In ED was given one dose of ceftriaxone for a positive UA (nitrites, Too numerous bacteria to count though squamous epithelial cells were present and WBC of 25). RVP was negative. Elevated WBC count of 13.33 with Neutrophil % of 82.2. Septic LEO was done (19 Sep 2024 22:55)      Events in last 24 hours:   ICU c/s called today for sepsis with hypotension 2/2 to UTI. Pt noted to have high fever this AM of 105F. Pt dropped her BP this AM to SBP 7's , pt received 1 L IVF bolus on floor and did BP still remains with SBP 80's   Pt was examined and assessed at the bedside and was noted to be awake alert and oriented sitting in the chair in NAD  Pt reports that she did not feel confused or lethargic, denies lightheadedness and dizziness.     Pt was given and additional bolus, SBP remains at 80. Pt transferred to ICU level care and will start on levophed gtt     REVIEW OF SYSTEMS:    Gen: No fever  EYES/ENT: No visual changes;  No vertigo or throat pain   NECK: No pain   RES:  No shortness of breath or Cough  CARD: No chest pain   GI: No abdominal pain  : No dysuria  NEURO: No weakness  SKIN: No itching, rashes     Physical Exam:  Vital Signs Last 24 Hrs  T(C): 36.9 (20 Sep 2024 11:46), Max: 40.6 (20 Sep 2024 02:00)  T(F): 98.5 (20 Sep 2024 11:46), Max: 105.1 (20 Sep 2024 02:40)  HR: 84 (20 Sep 2024 11:55) (67 - 110)  BP: 80/40 (20 Sep 2024 14:03) (74/50 - 135/62)  BP(mean): --  RR: 14 (20 Sep 2024 11:26) (14 - 18)  SpO2: 94% (20 Sep 2024 11:26) (94% - 98%)    Parameters below as of 20 Sep 2024 11:26  Patient On (Oxygen Delivery Method): room air        Gen:  Awake, alert   CNS: non focal 	  Neck: no JVD  RES : clear , no wheezing                      CVS: Regular  rhythm. Normal S1/S2  Abd: Soft, non-distended. Bowel sounds present.  Skin: No rash.  Ext:  no edema    ============================I/O===========================   I&O's Detail    ============================ LABS =========================                        10.1   16.64 )-----------( 185      ( 20 Sep 2024 09:00 )             30.9     09-20    140  |  108  |  28[H]  ----------------------------<  269[H]  3.5   |  25  |  1.30    Ca    8.1[L]      20 Sep 2024 09:00    TPro  5.8[L]  /  Alb  2.7[L]  /  TBili  0.7  /  DBili  x   /  AST  17  /  ALT  20  /  AlkPhos  86  09-20    LIVER FUNCTIONS - ( 20 Sep 2024 09:00 )  Alb: 2.7 g/dL / Pro: 5.8 g/dL / ALK PHOS: 86 U/L / ALT: 20 U/L / AST: 17 U/L / GGT: x           PT/INR - ( 19 Sep 2024 18:45 )   PT: 11.8 sec;   INR: 1.04 ratio         PTT - ( 19 Sep 2024 18:45 )  PTT:34.1 sec    Urinalysis Basic - ( 20 Sep 2024 09:00 )    Color: x / Appearance: x / SG: x / pH: x  Gluc: 269 mg/dL / Ketone: x  / Bili: x / Urobili: x   Blood: x / Protein: x / Nitrite: x   Leuk Esterase: x / RBC: x / WBC x   Sq Epi: x / Non Sq Epi: x / Bacteria: x      ======================Micro/Rad/Cardio=================  Culture: Urinalysis with Rflx Culture (09.19.24 @ 19:04)    Urine Appearance: Clear   Color: Yellow   Specific Gravity: 1.025   pH Urine: 5.5   Protein, Urine: 30 mg/dL   Glucose Qualitative, Urine: >=1000 mg/dL   Ketone - Urine: Negative mg/dL   Blood, Urine: Negative   Bilirubin: Negative   Urobilinogen: 0.2 mg/dL   Leukocyte Esterase Concentration: Negative   Nitrite: Positive      CXR: < from: Xray Chest 1 View- PORTABLE-Urgent (09.19.24 @ 19:07) >    IMPRESSION: No acute cardiopulmonary disease process.    --- End of Report ---    < end of copied text >      ======================================================  PAST MEDICAL & SURGICAL HISTORY:  DM (diabetes mellitus)      Benign essential HTN      H/O Bell's palsy      Gout      HLD (hyperlipidemia)      Psoriasis      No significant past surgical history      ***This patient requires critical care for support of one or more vital organ systems with a high probability of imminent or life threatening deterioration in his/her condition      ====================ASSESSMENT ==============  1. UTI   2. Septic Shock   3. fever   4. leukocytosis     Plan:  -transfer to ICU level care   -Tylenol PRN for fever   -Pt s/p 2L IVF bolus and without change and   midodrine. 5 milliGRAM(s) Oral three times a day  norepinephrine Infusion 0.05 MICROgram(s)/kG/Min (8.25 mL/Hr) IV Continuous <Continuous>  Pt remains on IVF @ 75   repeat LA pending   Pt remains on zosyn for UTI - will monitor WBC and trend fever curves   aspirin enteric coated 81 milliGRAM(s) Oral daily  heparin   Injectable 5000 Unit(s) SubCutaneous every 12 hours  -Pt with DM - will continue with SSI and - Aggressive glycemic control to limit FS glucose to < 180mg/dl, BS stable.      PLAN DISCUSSED IN DETAIL WITH ICU ATTENDING DR. STAHL WHO IS AGREEABLE TO THE ABOVE PLAN     DATE OF ENTRY OF THIS NOTE IS EQUAL TO THE DATE OF SERVICES RENDERED ****    TOTAL CRITICAL CARE TIME: 60 minutes (EXCLUSIVE of any non bundled procedures)    Critical Care time: 60 mins assessing presenting problems of acute illness that poses high probability of life threatening deterioration or end organ damage/dysfunction.  Medical decision making including Initiating plan of care, reviewing data, reviewing radiology, direct patient bedside evaluation and interpretation of vital signs, any necessary ventilator management , discussion with multidisciplinary team, discussing goals of care with patient/family, all non inclusive of procedures

## 2024-09-20 NOTE — PROVIDER CONTACT NOTE (OTHER) - ACTION/TREATMENT ORDERED:
----- Message from Barclay Osler, MD sent at 10/5/2022 12:26 PM EDT -----  Slight increase in protein  in urine but ratio of  protein to creatinine  lower   F/u as scheduled  with kidney doc
Left detailed message advising pt of results and recommendations
Dr. Minaya made aware, immediately came to see patient bedside.

## 2024-09-20 NOTE — ADVANCED PRACTICE NURSE CONSULT - ASSESSMENT
As per the H&P: 78 F hx IDDM, HTN, HLD, presents due to generalized weakness, fever. Pt was feeling weak/tired, fell asleep in her car, in the heat for over 2 hours. Throughout day, she was unable to do her daily tasks secondary to the fatigue. 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 This level of fatigue worried family and prompted them to bring her in to ED. In ED, she was found to be febrile to 102. She states she sometimes feels fatigue after taking her home medications which include many diabetic drugs; however, she has not had any changes in medication in recent time besides a monthly injection for her psoriasis.    In ED was given one dose of ceftriaxone for a positive UA (nitrites, Too numerous bacteria to count though squamous epithelial cells were present and WBC of 25). RVP was negative. Elevated WBC count of 13.33 with Neutrophil % of 82.2. Septic LEO was done    Met with the patient on [address]. Patient awake and sitting in the chair. Multiple family members at the bedside. A+Ox3 and reports having a large blister to the front of her ankle from her new shoes. R ankle with a healed blister also from inappropriate footwear. Patient is a diabetic with peripheral neuropathy and unable to feel pain if an injury occurs to the LEs. +DP bilaterally. Blister is now opened revealing a blanchable partial thickness wound measuring 3cm x 5cm x 0.1cm. Minimal serosanguinous drainage noted. Cleansed with NS and pat dry. Applied a piece of Xeroform and covered with dry gauze. Secured with Tegaderm. Patient tolerated well and verbalizes understanding to continue following up with her podiatrist regarding foot and nail care and an orthotist for appropriate footwear.

## 2024-09-20 NOTE — CONSULT NOTE ADULT - SUBJECTIVE AND OBJECTIVE BOX
[facility]  INFECTIOUS DISEASES   [address] [place] [address]  Tel: [phone]     Fax: [phone]  ======================================================  MD Ken Ivan Kaushal, MD Cho, Michelle, MD   ======================================================    Assessment/Recommendations     78-year-old  female with history of insulin-dependent diabetes mellitus hypertension dyslipidemia who presented with generalized weakness fever confusion being admitted for    Complicated UTI  Rule out pyelonephritis  Rule out bacteremia  High fever  Acute metabolic encephalopathy  Acute kidney injury    Patient seen and examined  WBC improved, afebrile, hemodynamically stable  Trend WBC and temperature curve through hospital course      Cultures:   blood culture: 2 sets: :Pending  Urine culture: : Pending     Antibiotics:   Agree with continuing with close monitoring of vancomycin level and very low threshold to discontinue vancomycin if blood culture remain negative by tomorrow   recommend adding topical Bactroban for left ankle and being evaluated by wound management    Treat pyrexia with antipyretic agents as well as cooling blankets aggressively   recommend CT abdomen pelvis with IV contrast ( renal function permitting)     recommend IV hydration, monitor renal function closely      Records, reports from primary team, nursing, consultant reviewed  Plan explained to patient   Case discussed with Primary team   _________________________________________________-  Patient is a 78y old  Female who presents with a chief complaint of increased fatigue,  high temperature (20 Sep 2024 09:23)    HPI:  78 F hx IDDM, HTN, HLD, presents due to generalized weakness, fever. Pt was feeling weak/tired, fell asleep in her car, in the heat for over 2 hours. Throughout day, she was unable to do her daily tasks secondary to the fatigue. 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 This level of fatigue worried family and prompted them to bring her in to ED. In ED, she was found to be febrile to 102. She states she sometimes feels fatigue after taking her home medications which include many diabetic drugs; however, she has not had any changes in medication in recent time besides a monthly injection for her psoriasis.    In ED was given one dose of ceftriaxone for a positive UA (nitrites, Too numerous bacteria to count though squamous epithelial cells were present and WBC of 25). RVP was negative. Elevated WBC count of 13.33 with Neutrophil % of 82.2. Septic LEO was done (19 Sep 2024 22:55)     upon further questioning patient has not had any dysuria but has frequency for last few days attributed to changing the dose of her Jardiance about a month ago, denied any hematuria; had an episode of vomiting and severe encephalopathy associated with high fever last night but no such episodes prior to arrival.  Patient was treated with clindamycin p.o. as an outpatient about 4 weeks ago for right ankle infected blister/  and currently taking Bactroban topical ointment for the left  ankle blister for last few days.  Denied any major infectious disease history.    PAST MEDICAL & SURGICAL HISTORY:  DM (diabetes mellitus)  Benign essential HTN  H/O Bell's palsy  Gout  HLD (hyperlipidemia)  Psoriasis  No significant past surgical history    SOCIAL HISTORY:   lives by herself, denies any tobacco alcohol or substance abuse history  3 daughters at bedside assist her with her medical conditions    FAMILY HISTORY:   noncontributory pertaining to presenting illness    Recent Ill Contacts:	 none  Recent Travel History:	 none  Recent Animal/Insect Exposure/Tick Bites: none      REVIEW OF SYSTEMS  11 systems reviewed, no other symptoms except as above      Allergies    No Known Allergies    Medications:   piperacillin/tazobactam IVPB.. 3.375 Gram(s) IV Intermittent every 8 hours  vancomycin  IVPB 1250 milliGRAM(s) IV Intermittent every 24 hours  acetaminophen     Tablet .. 650 milliGRAM(s) Oral every 6 hours PRN  aspirin enteric coated 81 milliGRAM(s) Oral daily  atorvastatin 40 milliGRAM(s) Oral at bedtime  dextrose 5%. 1000 milliLiter(s) IV Continuous <Continuous>  dextrose 5%. 1000 milliLiter(s) IV Continuous <Continuous>  dextrose 50% Injectable 25 Gram(s) IV Push once  dextrose 50% Injectable 25 Gram(s) IV Push once  dextrose 50% Injectable 12.5 Gram(s) IV Push once  dextrose Oral Gel 15 Gram(s) Oral once PRN  glucagon  Injectable 1 milliGRAM(s) IntraMuscular once  heparin   Injectable 5000 Unit(s) SubCutaneous every 12 hours  insulin glargine Injectable (LANTUS) 8 Unit(s) SubCutaneous every morning  insulin lispro (ADMELOG) corrective regimen sliding scale   SubCutaneous at bedtime  insulin lispro (ADMELOG) corrective regimen sliding scale   SubCutaneous three times a day before meals  melatonin 3 milliGRAM(s) Oral at bedtime PRN  multivitamin/minerals 1 Tablet(s) Oral daily  pregabalin 75 milliGRAM(s) Oral every 12 hours  sodium chloride 0.9%. 1000 milliLiter(s) IV Continuous <Continuous>        ____________________________________________  Physical Examination:    Vital Signs Last 24 Hrs  T(C): 36.9 (20 Sep 2024 06:25), Max: 40.6 (20 Sep 2024 02:00)  T(F): 98.5 (20 Sep 2024 06:25), Max: 105.1 (20 Sep 2024 02:40)  HR: 110 (20 Sep 2024 02:40) (80 - 110)  BP: 117/68 (20 Sep 2024 02:40) (94/59 - 135/62)  RR: 17 (20 Sep 2024 02:40) (16 - 18)  SpO2: 94% (20 Sep 2024 02:40) (94% - 98%)    O2 Parameters below as of 20 Sep 2024 02:40  Patient On (Oxygen Delivery Method): room air    General: No acute distress while lying in bed    Neuro: AAO*4, No obvious acute motor deficit  HEENT: Pupils equal, reactive to light, Oral mucosa dry  PULM: Clear to auscultation bilaterally, no significant adventitious breath sounds   CVS: Regular rhythm and controlled rate  ABD: Soft, nondistended, nontender, normoactive bowel sounds, no CVA tenderness  EXT: No b/l LE edema, nontender with pedal pulse palpable ; Healing blister around the left ankle with minimal cellulitic changes  SKIN: Warm and well perfused, no acute rashes   NO obvious palpable lymphadenopathy     _______________________________________________________    Lab Results:                        10.1   16.64 )-----------( 185      ( 20 Sep 2024 09:00 )             30.9     -    138  |  104  |  26[H]  ----------------------------<  210[H]  4.3   |  26  |  1.40[H]    Ca    9.8      19 Sep 2024 18:45    TPro  7.7  /  Alb  3.6  /  TBili  0.9  /  DBili  x   /  AST  11[L]  /  ALT  26  /  AlkPhos  121[H]  -    LIVER FUNCTIONS - ( 19 Sep 2024 18:45 )  Alb: 3.6 g/dL / Pro: 7.7 g/dL / ALK PHOS: 121 U/L / ALT: 26 U/L / AST: 11 U/L / GGT: x           PT/INR - ( 19 Sep 2024 18:45 )   PT: 11.8 sec;   INR: 1.04 ratio         PTT - ( 19 Sep 2024 18:45 )  PTT:34.1 sec  Urinalysis Basic - ( 19 Sep 2024 19:04 )    Color: Yellow / Appearance: Clear / S.025 / pH: x  Gluc: x / Ketone: Negative mg/dL  / Bili: Negative / Urobili: 0.2 mg/dL   Blood: x / Protein: 30 mg/dL / Nitrite: Positive   Leuk Esterase: Negative / RBC: 5 /HPF / WBC 25 /HPF   Sq Epi: x / Non Sq Epi: x / Bacteria: Too Numerous to count /HPF        MICROBIOLOGY/PATHOLOGY/ RADIOLOGY: Reviewed

## 2024-09-20 NOTE — PROGRESS NOTE ADULT - ASSESSMENT
78 F hx IDDM, HTN, HLD, presents due to generalized weakness, fever admitted for Sepsis secondary to UTI

## 2024-09-20 NOTE — CHART NOTE - NSCHARTNOTEFT_GEN_A_CORE
At roughly 3 am, received a call that recently admitted patient Mrs. Perkins, who was recently moved up to 371D from ED had a rectal temperature of 105.1. Patient immediately assessed. Waking up to voice and stimulus, however not as awake, alert as she was in ED. Ordered IV tylenol 1g stat. Spoke with Nursing Manager, patient to be placed on cooling blanket. In addition, ceftriaxone which was initially selected to cover UTI was escalated to Vancomycin and Zosyn for bacteremia. ID will see patient in AM.

## 2024-09-20 NOTE — PROGRESS NOTE ADULT - SUBJECTIVE AND OBJECTIVE BOX
Patient is a 78y old  Female who presents with a chief complaint of increased fatigue,  high temperature (19 Sep 2024 22:55)       INTERVAL HPI/OVERNIGHT EVENTS: patient seen and examined at bedside. Temperature improved. Daughters at bedside. Denies chest pain, palpitations, sob, n/v/d/c     MEDICATIONS  (STANDING):  aspirin enteric coated 81 milliGRAM(s) Oral daily  atorvastatin 40 milliGRAM(s) Oral at bedtime  dextrose 5%. 1000 milliLiter(s) (50 mL/Hr) IV Continuous <Continuous>  dextrose 5%. 1000 milliLiter(s) (100 mL/Hr) IV Continuous <Continuous>  dextrose 50% Injectable 25 Gram(s) IV Push once  dextrose 50% Injectable 12.5 Gram(s) IV Push once  dextrose 50% Injectable 25 Gram(s) IV Push once  glucagon  Injectable 1 milliGRAM(s) IntraMuscular once  heparin   Injectable 5000 Unit(s) SubCutaneous every 12 hours  insulin glargine Injectable (LANTUS) 8 Unit(s) SubCutaneous every morning  insulin lispro (ADMELOG) corrective regimen sliding scale   SubCutaneous at bedtime  insulin lispro (ADMELOG) corrective regimen sliding scale   SubCutaneous three times a day before meals  multivitamin/minerals 1 Tablet(s) Oral daily  piperacillin/tazobactam IVPB.. 3.375 Gram(s) IV Intermittent every 8 hours  pregabalin 75 milliGRAM(s) Oral every 12 hours  sodium chloride 0.9%. 1000 milliLiter(s) (70 mL/Hr) IV Continuous <Continuous>  vancomycin  IVPB 1250 milliGRAM(s) IV Intermittent every 24 hours    MEDICATIONS  (PRN):  acetaminophen     Tablet .. 650 milliGRAM(s) Oral every 6 hours PRN Temp greater or equal to 38C (100.4F), Mild Pain (1 - 3)  dextrose Oral Gel 15 Gram(s) Oral once PRN Blood Glucose LESS THAN 70 milliGRAM(s)/deciliter  melatonin 3 milliGRAM(s) Oral at bedtime PRN Insomnia      Allergies    No Known Allergies    Intolerances        REVIEW OF SYSTEMS:  Per HPI. All other ROS noted Negative   Vital Signs Last 24 Hrs  T(C): 36.9 (20 Sep 2024 06:25), Max: 40.6 (20 Sep 2024 02:00)  T(F): 98.5 (20 Sep 2024 06:25), Max: 105.1 (20 Sep 2024 02:40)  HR: 110 (20 Sep 2024 02:40) (80 - 110)  BP: 117/68 (20 Sep 2024 02:40) (94/59 - 135/62)  BP(mean): --  RR: 17 (20 Sep 2024 02:40) (16 - 18)  SpO2: 94% (20 Sep 2024 02:40) (94% - 98%)    Parameters below as of 20 Sep 2024 02:40  Patient On (Oxygen Delivery Method): room air        PHYSICAL EXAM:  GENERAL: NAD, well-groomed, well-developed  HEAD:  Atraumatic, Normocephalic  EYES: EOMI, PERRLA, conjunctiva and sclera clear  ENMT: No tonsillar erythema, exudates, or enlargement; Moist mucous membranes  NECK: Supple, No JVD, Normal thyroid  NERVOUS SYSTEM:  Alert & Oriented X3, Good concentration; Motor Strength 5/5 B/L upper and lower extremities  CHEST/LUNG: Clear to auscultation bilaterally; No rales, rhonchi, wheezing, or rubs  HEART: Regular rate and rhythm; No murmurs, rubs, or gallops  ABDOMEN: Soft, Nontender, Nondistended; Bowel sounds present  EXTREMITIES:  2+ Peripheral Pulses, No clubbing, cyanosis, or edema  LYMPH: No lymphadenopathy noted  SKIN: No rashes or lesions    LABS:                        11.8   13.33 )-----------( 229      ( 19 Sep 2024 18:45 )             37.4     19 Sep 2024 18:45    138    |  104    |  26     ----------------------------<  210    4.3     |  26     |  1.40     Ca    9.8        19 Sep 2024 18:45    TPro  7.7    /  Alb  3.6    /  TBili  0.9    /  DBili  x      /  AST  11     /  ALT  26     /  AlkPhos  121    19 Sep 2024 18:45    PT/INR - ( 19 Sep 2024 18:45 )   PT: 11.8 sec;   INR: 1.04 ratio         PTT - ( 19 Sep 2024 18:45 )  PTT:34.1 sec  CAPILLARY BLOOD GLUCOSE      POCT Blood Glucose.: 276 mg/dL (20 Sep 2024 08:14)  POCT Blood Glucose.: 167 mg/dL (19 Sep 2024 22:05)  POCT Blood Glucose.: 205 mg/dL (19 Sep 2024 18:32)    BLOOD CULTURE    RADIOLOGY & ADDITIONAL TESTS:    Imaging Personally Reviewed:  [ ] YES     Consultant(s) Notes Reviewed:      Care Discussed with Consultants/Other Providers:

## 2024-09-20 NOTE — ADVANCED PRACTICE NURSE CONSULT - RECOMMEDATIONS
L anterior ankle friction injury  ·	Cleanse with NS and pat dry. Cover with Xeroform and dry gauze. Secure with Tegaderm. Daily/PRN soiling.     Prevention  ·	Moisturize skin with Sween24 daily after bathing.  ·	Continue turning and positioning q2h and utilize offloading devices as per protocol.  ·	Allevyn Gentle 3x3 to protect skin under medical devices (i.e. oxygen tubing). Change q7days PRN soiling.  ·	Avoid use of diapers and utilize external catheters if patient unable to toilet OOB.  ·	Continue with only one breathable underpad and daily/PRN pericare for soiling.  ·	Waffle cushion if OOB to chair.  ·	Nutrition Consult for optimization in pt w/ increased nutritional needs.  ·	Encourage high quality protein, Kuldip/Prosource, MVI & Vit C to promote wound healing.

## 2024-09-20 NOTE — PATIENT PROFILE ADULT - FUNCTIONAL ASSESSMENT - BASIC MOBILITY 6.
3-calculated by average/Not able to assess (calculate score using [facility] averaging method)

## 2024-09-20 NOTE — PROVIDER CONTACT NOTE (OTHER) - SITUATION
Called to room by patient daughter, as pt was shivering continuously. Pt felt warm to touch. Rectal temp taken 105.1, HR in the 110s.

## 2024-09-20 NOTE — CONSULT NOTE ADULT - SUBJECTIVE AND OBJECTIVE BOX
Patient is a 78y old  Female who presents with a chief complaint of increased fatigue,  high temperature (20 Sep 2024 14:37)    Type: 2 DX 21 year no known complications Endocrine Dr gay Wyatt Last seen 3 months, a1c 7.5% Rx home toujeo 12 units daily, mounjarno 10mg weekly, glimepiride 2 mg daily, jardiance 25mg daily,  metformin 1000mg BID, uses freestyle lauren 3 cgm, reports readings ~120, reveiwed DM regimen and MOA, holding while inpatient and using MDII insulin, increase lantus to 12 units for hyperglycemia/ reviewed CC diet and carb identification, portion control. pt reports no needs, verbal education provided.diabetes education provided- A1c measure and BG targets  fasting, <180 2 hours postmeal. medication MOA and considerations/side effects, inhospital BGM frequency and insulin administration, s/s of hyperglycemia/hypoglycemia and management, glycemic control and preventing complications, consistent carb diet, balanced plate method, consistent meal planning. sick day management, provider f/u  Hx HLD, HTN    HPI:  78 F hx IDDM, HTN, HLD, presents due to generalized weakness, fever. Pt was feeling weak/tired, fell asleep in her car, in the heat for over 2 hours. Throughout day, she was unable to do her daily tasks secondary to the fatigue. 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 This level of fatigue worried family and prompted them to bring her in to ED. In ED, she was found to be febrile to 102. She states she sometimes feels fatigue after taking her home medications which include many diabetic drugs; however, she has not had any changes in medication in recent time besides a monthly injection for her psoriasis.    In ED was given one dose of ceftriaxone for a positive UA (nitrites, Too numerous bacteria to count though squamous epithelial cells were present and WBC of 25). RVP was negative. Elevated WBC count of 13.33 with Neutrophil % of 82.2. Septic LEO was done (19 Sep 2024 22:55)      PAST MEDICAL & SURGICAL HISTORY:  DM (diabetes mellitus)      Benign essential HTN      H/O Bell's palsy      Gout      HLD (hyperlipidemia)      Psoriasis      No significant past surgical history      Allergies    No Known Allergies    Intolerances        MEDICATIONS  (STANDING):  aspirin enteric coated 81 milliGRAM(s) Oral daily  atorvastatin 40 milliGRAM(s) Oral at bedtime  dextrose 5%. 1000 milliLiter(s) (100 mL/Hr) IV Continuous <Continuous>  dextrose 5%. 1000 milliLiter(s) (50 mL/Hr) IV Continuous <Continuous>  dextrose 50% Injectable 12.5 Gram(s) IV Push once  dextrose 50% Injectable 25 Gram(s) IV Push once  dextrose 50% Injectable 25 Gram(s) IV Push once  glucagon  Injectable 1 milliGRAM(s) IntraMuscular once  heparin   Injectable 5000 Unit(s) SubCutaneous every 12 hours  insulin glargine Injectable (LANTUS) 12 Unit(s) SubCutaneous every morning  insulin lispro (ADMELOG) corrective regimen sliding scale   SubCutaneous three times a day before meals  insulin lispro (ADMELOG) corrective regimen sliding scale   SubCutaneous at bedtime  midodrine. 10 milliGRAM(s) Oral three times a day  multivitamin/minerals 1 Tablet(s) Oral daily  norepinephrine Infusion 0.05 MICROgram(s)/kG/Min (8.25 mL/Hr) IV Continuous <Continuous>  piperacillin/tazobactam IVPB.. 3.375 Gram(s) IV Intermittent every 8 hours  pregabalin 75 milliGRAM(s) Oral every 12 hours  sodium chloride 0.9%. 1000 milliLiter(s) (100 mL/Hr) IV Continuous <Continuous>  vancomycin  IVPB 1250 milliGRAM(s) IV Intermittent every 24 hours

## 2024-09-20 NOTE — CHART NOTE - NSCHARTNOTEFT_GEN_A_CORE
: Ashley    INDICATION: shock    PROCEDURE:  [x] LIMITED ECHO  [x] LIMITED CHEST  [ ] LIMITED RETROPERITONEAL  [ ] LIMITED ABDOMINAL  [ ] LIMITED DVT  [ ] NEEDLE GUIDANCE VASCULAR  [ ] NEEDLE GUIDANCE THORACENTESIS  [ ] NEEDLE GUIDANCE PARACENTESIS  [ ] NEEDLE GUIDANCE PERICARDIOCENTESIS  [ ] OTHER    FINDINGS:  - A-line predominance anteriorly with no basilar consolidations or effusions  - grossly preserved LV function, no evidence of RV dilation  - IVC plump with no respiratory variation    INTERPRETATION:  - appears adequately volume resuscitation, BP unlikely to benefit from additional fluid bolus

## 2024-09-21 ENCOUNTER — RESULT REVIEW (OUTPATIENT)
Age: 79
End: 2024-09-21

## 2024-09-21 ENCOUNTER — TRANSCRIPTION ENCOUNTER (OUTPATIENT)
Age: 79
End: 2024-09-21

## 2024-09-21 LAB
ALBUMIN SERPL ELPH-MCNC: 3.2 G/DL — LOW (ref 3.3–5)
ALP SERPL-CCNC: 72 U/L — SIGNIFICANT CHANGE UP (ref 40–120)
ALT FLD-CCNC: 23 U/L — SIGNIFICANT CHANGE UP (ref 12–78)
ANION GAP SERPL CALC-SCNC: 6 MMOL/L — SIGNIFICANT CHANGE UP (ref 5–17)
AST SERPL-CCNC: 14 U/L — LOW (ref 15–37)
BASOPHILS # BLD AUTO: 0.08 K/UL — SIGNIFICANT CHANGE UP (ref 0–0.2)
BASOPHILS NFR BLD AUTO: 0.7 % — SIGNIFICANT CHANGE UP (ref 0–2)
BILIRUB SERPL-MCNC: 0.6 MG/DL — SIGNIFICANT CHANGE UP (ref 0.2–1.2)
BUN SERPL-MCNC: 25 MG/DL — HIGH (ref 7–23)
CALCIUM SERPL-MCNC: 7.5 MG/DL — LOW (ref 8.5–10.1)
CHLORIDE SERPL-SCNC: 110 MMOL/L — HIGH (ref 96–108)
CO2 SERPL-SCNC: 24 MMOL/L — SIGNIFICANT CHANGE UP (ref 22–31)
CREAT SERPL-MCNC: 1.4 MG/DL — HIGH (ref 0.5–1.3)
EGFR: 39 ML/MIN/1.73M2 — LOW
EOSINOPHIL # BLD AUTO: 0.29 K/UL — SIGNIFICANT CHANGE UP (ref 0–0.5)
EOSINOPHIL NFR BLD AUTO: 2.6 % — SIGNIFICANT CHANGE UP (ref 0–6)
GLUCOSE SERPL-MCNC: 138 MG/DL — HIGH (ref 70–99)
HCT VFR BLD CALC: 27.4 % — LOW (ref 34.5–45)
HGB BLD-MCNC: 8.9 G/DL — LOW (ref 11.5–15.5)
IMM GRANULOCYTES NFR BLD AUTO: 0.4 % — SIGNIFICANT CHANGE UP (ref 0–0.9)
LYMPHOCYTES # BLD AUTO: 18.6 % — SIGNIFICANT CHANGE UP (ref 13–44)
LYMPHOCYTES # BLD AUTO: 2.08 K/UL — SIGNIFICANT CHANGE UP (ref 1–3.3)
MAGNESIUM SERPL-MCNC: 1.5 MG/DL — LOW (ref 1.6–2.6)
MCHC RBC-ENTMCNC: 28.4 PG — SIGNIFICANT CHANGE UP (ref 27–34)
MCHC RBC-ENTMCNC: 32.5 GM/DL — SIGNIFICANT CHANGE UP (ref 32–36)
MCV RBC AUTO: 87.5 FL — SIGNIFICANT CHANGE UP (ref 80–100)
MONOCYTES # BLD AUTO: 0.89 K/UL — SIGNIFICANT CHANGE UP (ref 0–0.9)
MONOCYTES NFR BLD AUTO: 7.9 % — SIGNIFICANT CHANGE UP (ref 2–14)
MRSA PCR RESULT.: SIGNIFICANT CHANGE UP
NEUTROPHILS # BLD AUTO: 7.82 K/UL — HIGH (ref 1.8–7.4)
NEUTROPHILS NFR BLD AUTO: 69.8 % — SIGNIFICANT CHANGE UP (ref 43–77)
NRBC # BLD: 0 /100 WBCS — SIGNIFICANT CHANGE UP (ref 0–0)
PHOSPHATE SERPL-MCNC: 2.7 MG/DL — SIGNIFICANT CHANGE UP (ref 2.5–4.5)
PLATELET # BLD AUTO: 182 K/UL — SIGNIFICANT CHANGE UP (ref 150–400)
POTASSIUM SERPL-MCNC: 3.4 MMOL/L — LOW (ref 3.5–5.3)
POTASSIUM SERPL-SCNC: 3.4 MMOL/L — LOW (ref 3.5–5.3)
PROT SERPL-MCNC: 6.2 G/DL — SIGNIFICANT CHANGE UP (ref 6–8.3)
RBC # BLD: 3.13 M/UL — LOW (ref 3.8–5.2)
RBC # FLD: 16.1 % — HIGH (ref 10.3–14.5)
S AUREUS DNA NOSE QL NAA+PROBE: SIGNIFICANT CHANGE UP
SODIUM SERPL-SCNC: 140 MMOL/L — SIGNIFICANT CHANGE UP (ref 135–145)
URATE SERPL-MCNC: 5.7 MG/DL — SIGNIFICANT CHANGE UP (ref 2.5–7)
WBC # BLD: 11.2 K/UL — HIGH (ref 3.8–10.5)
WBC # FLD AUTO: 11.2 K/UL — HIGH (ref 3.8–10.5)

## 2024-09-21 PROCEDURE — 99291 CRITICAL CARE FIRST HOUR: CPT

## 2024-09-21 PROCEDURE — 76705 ECHO EXAM OF ABDOMEN: CPT | Mod: 26

## 2024-09-21 PROCEDURE — 93306 TTE W/DOPPLER COMPLETE: CPT | Mod: 26

## 2024-09-21 PROCEDURE — 93308 TTE F-UP OR LMTD: CPT | Mod: 26

## 2024-09-21 PROCEDURE — 99232 SBSQ HOSP IP/OBS MODERATE 35: CPT

## 2024-09-21 RX ORDER — MAGNESIUM SULFATE 500 MG/ML
2 VIAL (ML) INJECTION ONCE
Refills: 0 | Status: COMPLETED | OUTPATIENT
Start: 2024-09-21 | End: 2024-09-21

## 2024-09-21 RX ORDER — MIDODRINE HYDROCHLORIDE 5 MG/1
10 TABLET ORAL THREE TIMES A DAY
Refills: 0 | Status: DISCONTINUED | OUTPATIENT
Start: 2024-09-21 | End: 2024-09-22

## 2024-09-21 RX ADMIN — Medication 650 MILLIGRAM(S): at 10:13

## 2024-09-21 RX ADMIN — MIDODRINE HYDROCHLORIDE 10 MILLIGRAM(S): 5 TABLET ORAL at 17:36

## 2024-09-21 RX ADMIN — PIPERACILLIN SODIUM AND TAZOBACTAM SODIUM 25 GRAM(S): 12; 1.5 INJECTION, POWDER, LYOPHILIZED, FOR SOLUTION INTRAVENOUS at 05:39

## 2024-09-21 RX ADMIN — Medication 1 TABLET(S): at 11:16

## 2024-09-21 RX ADMIN — INSULIN GLARGINE 12 UNIT(S): 300 INJECTION, SOLUTION SUBCUTANEOUS at 08:32

## 2024-09-21 RX ADMIN — Medication 50 MILLILITER(S): at 02:28

## 2024-09-21 RX ADMIN — Medication 5000 UNIT(S): at 05:39

## 2024-09-21 RX ADMIN — MIDODRINE HYDROCHLORIDE 10 MILLIGRAM(S): 5 TABLET ORAL at 01:02

## 2024-09-21 RX ADMIN — Medication 100 MILLILITER(S): at 08:32

## 2024-09-21 RX ADMIN — Medication 25 GRAM(S): at 08:32

## 2024-09-21 RX ADMIN — PIPERACILLIN SODIUM AND TAZOBACTAM SODIUM 25 GRAM(S): 12; 1.5 INJECTION, POWDER, LYOPHILIZED, FOR SOLUTION INTRAVENOUS at 21:58

## 2024-09-21 RX ADMIN — PREGABALIN 75 MILLIGRAM(S): 25 CAPSULE ORAL at 17:36

## 2024-09-21 RX ADMIN — PIPERACILLIN SODIUM AND TAZOBACTAM SODIUM 25 GRAM(S): 12; 1.5 INJECTION, POWDER, LYOPHILIZED, FOR SOLUTION INTRAVENOUS at 14:52

## 2024-09-21 RX ADMIN — Medication 1: at 12:51

## 2024-09-21 RX ADMIN — VANCOMYCIN HCL-SODIUM CHLORIDE IV SOLN 1.5 GM/250ML-0.9% 166.67 MILLIGRAM(S): 1.5-0.9/25 SOLUTION at 02:33

## 2024-09-21 RX ADMIN — ATORVASTATIN CALCIUM 40 MILLIGRAM(S): 10 TABLET, FILM COATED ORAL at 21:58

## 2024-09-21 RX ADMIN — PREGABALIN 75 MILLIGRAM(S): 25 CAPSULE ORAL at 05:40

## 2024-09-21 RX ADMIN — Medication 650 MILLIGRAM(S): at 10:32

## 2024-09-21 RX ADMIN — MIDODRINE HYDROCHLORIDE 10 MILLIGRAM(S): 5 TABLET ORAL at 11:15

## 2024-09-21 RX ADMIN — Medication 5000 UNIT(S): at 17:36

## 2024-09-21 RX ADMIN — Medication 81 MILLIGRAM(S): at 11:16

## 2024-09-21 RX ADMIN — Medication 1: at 08:32

## 2024-09-21 RX ADMIN — Medication 40 MILLIEQUIVALENT(S): at 09:58

## 2024-09-21 NOTE — PROGRESS NOTE ADULT - SUBJECTIVE AND OBJECTIVE BOX
Interval Events: Patient admitted to ICU overnight 2/2 sepsis from uti. Patient placed on midodrine TID, s/p albumin x2. Did not receive any pressors    Review of Systems:  Constitutional: No fever, chills, fatigue  Neuro: No headache, numbness, weakness  Resp: No cough, wheezing, shortness of breath  CVS: No chest pain, palpitations, leg swelling  GI: No abdominal pain, nausea, vomiting, diarrhea   : No dysuria, frequency, incontinence  Skin: No itching, burning, rashes, or lesions   Msk: No joint pain or swelling  Psych: No depression, anxiety, mood swings    ICU Vital Signs Last 24 Hrs  T(C): 38 (21 Sep 2024 11:54), Max: 38.5 (20 Sep 2024 21:00)  T(F): 100.4 (21 Sep 2024 11:54), Max: 101.3 (20 Sep 2024 21:00)  HR: 74 (21 Sep 2024 11:00) (58 - 77)  BP: 112/59 (21 Sep 2024 11:00) (80/40 - 142/63)  BP(mean): 80 (21 Sep 2024 11:00) (63 - 95)  ABP: --  ABP(mean): --  RR: 22 (21 Sep 2024 11:00) (17 - 34)  SpO2: 93% (21 Sep 2024 11:00) (91% - 99%)    O2 Parameters below as of 21 Sep 2024 07:30  Patient On (Oxygen Delivery Method): room air                CAPILLARY BLOOD GLUCOSE      POCT Blood Glucose.: 158 mg/dL (21 Sep 2024 08:32)      I&O's Summary    20 Sep 2024 07:01  -  21 Sep 2024 07:00  --------------------------------------------------------  IN: 2550 mL / OUT: 480 mL / NET: 2070 mL    21 Sep 2024 07:01  -  21 Sep 2024 12:15  --------------------------------------------------------  IN: 525 mL / OUT: 0 mL / NET: 525 mL      GENERAL: NAD, lying in bed comfortably  HEAD:  Atraumatic  EYES: conjunctiva and sclera clear  ENT: Moist mucous membranes  NECK: no JVD  HEART: Regular rate and rhythm, no murmurs, rubs, or gallops  LUNGS: Unlabored respirations.  Clear to auscultation bilaterally, no crackles, wheezing, or rhonchi  ABDOMEN: Soft, nontender, nondistended  EXTREMITIES: No clubbing, cyanosis, or edema  NERVOUS SYSTEM:  A&Ox3  SKIN: No rashes or lesions    Meds:  piperacillin/tazobactam IVPB.. IV Intermittent    midodrine. Oral    atorvastatin Oral  dextrose 50% Injectable IV Push  dextrose 50% Injectable IV Push  dextrose 50% Injectable IV Push  dextrose Oral Gel Oral PRN  glucagon  Injectable IntraMuscular  insulin glargine Injectable (LANTUS) SubCutaneous  insulin lispro (ADMELOG) corrective regimen sliding scale SubCutaneous  insulin lispro (ADMELOG) corrective regimen sliding scale SubCutaneous      acetaminophen     Tablet .. Oral PRN  melatonin Oral PRN  pregabalin Oral      aspirin enteric coated Oral  heparin   Injectable SubCutaneous        dextrose 5%. IV Continuous  dextrose 5%. IV Continuous  multivitamin/minerals Oral  sodium chloride 0.9%. IV Continuous                                  8.9    11.20 )-----------( 182      ( 21 Sep 2024 05:45 )             27.4       09-21    140  |  110[H]  |  25[H]  ----------------------------<  138[H]  3.4[L]   |  24  |  1.40[H]    Ca    7.5[L]      21 Sep 2024 05:45  Phos  2.7     09-21  Mg     1.5     09-21    TPro  6.2  /  Alb  3.2[L]  /  TBili  0.6  /  DBili  x   /  AST  14[L]  /  ALT  23  /  AlkPhos  72  09-21    Lactate 2.2           09-20 @ 18:35    Lactate 2.4           09-20 @ 14:10          PT/INR - ( 19 Sep 2024 18:45 )   PT: 11.8 sec;   INR: 1.04 ratio         PTT - ( 19 Sep 2024 18:45 )  PTT:34.1 sec  Urinalysis Basic - ( 21 Sep 2024 05:45 )    Color: x / Appearance: x / SG: x / pH: x  Gluc: 138 mg/dL / Ketone: x  / Bili: x / Urobili: x   Blood: x / Protein: x / Nitrite: x   Leuk Esterase: x / RBC: x / WBC x   Sq Epi: x / Non Sq Epi: x / Bacteria: x      Clean Catch   >100,000 CFU/ml Escherichia coli -- 09-19 @ 19:04  .Blood Blood-Peripheral   No growth at 24 hours -- 09-19 @ 18:45      Rapid RVP Result: NotDetec (09-19 @ 18:45)          Radiology:    Bedside Ultrasound:  b/l us of kidney US completed: possible anatomical abnormalitiy found on left kidney, No hydro noted    Tubes/Lines:  peripheral    GLOBAL ISSUE/BEST PRACTICE:  Analgesia:  Y  Sedation: N  HOB elevation: Y  Stress ulcer prophylaxis: N  VTE prophylaxis:  hep  Glycemic control: Y  Nutrition: Y    CODE STATUS:  Full code

## 2024-09-21 NOTE — PROGRESS NOTE ADULT - SUBJECTIVE AND OBJECTIVE BOX
Patient is a 78y old  Female who presents with a chief complaint of increased fatigue,  high temperature (21 Sep 2024 13:36)      INTERVAL HPI/OVERNIGHT EVENTS: Patient seen and examined at bedside. No overnight events. Tolerating diet. Denies fever, chills, chest pain, shortness of breath, abdominal pain, nausea/vomiting, headache.    MEDICATIONS  (STANDING):  aspirin enteric coated 81 milliGRAM(s) Oral daily  atorvastatin 40 milliGRAM(s) Oral at bedtime  dextrose 5%. 1000 milliLiter(s) (50 mL/Hr) IV Continuous <Continuous>  dextrose 5%. 1000 milliLiter(s) (100 mL/Hr) IV Continuous <Continuous>  dextrose 50% Injectable 12.5 Gram(s) IV Push once  dextrose 50% Injectable 25 Gram(s) IV Push once  dextrose 50% Injectable 25 Gram(s) IV Push once  glucagon  Injectable 1 milliGRAM(s) IntraMuscular once  heparin   Injectable 5000 Unit(s) SubCutaneous every 12 hours  insulin glargine Injectable (LANTUS) 12 Unit(s) SubCutaneous every morning  insulin lispro (ADMELOG) corrective regimen sliding scale   SubCutaneous at bedtime  insulin lispro (ADMELOG) corrective regimen sliding scale   SubCutaneous three times a day before meals  midodrine. 10 milliGRAM(s) Oral three times a day  multivitamin/minerals 1 Tablet(s) Oral daily  piperacillin/tazobactam IVPB.. 3.375 Gram(s) IV Intermittent every 8 hours  pregabalin 75 milliGRAM(s) Oral every 12 hours    MEDICATIONS  (PRN):  acetaminophen     Tablet .. 650 milliGRAM(s) Oral every 6 hours PRN Temp greater or equal to 38C (100.4F), Mild Pain (1 - 3)  dextrose Oral Gel 15 Gram(s) Oral once PRN Blood Glucose LESS THAN 70 milliGRAM(s)/deciliter  melatonin 3 milliGRAM(s) Oral at bedtime PRN Insomnia      Allergies    No Known Allergies    Intolerances        REVIEW OF SYSTEMS:  CONSTITUTIONAL: No fever or chills  HEENT:  No headache, no sore throat  RESPIRATORY: No cough, wheezing, or shortness of breath  CARDIOVASCULAR: No chest pain, palpitations  GASTROINTESTINAL: No abd pain, nausea, vomiting, or diarrhea  GENITOURINARY: No dysuria, frequency, or hematuria  NEUROLOGICAL: no focal weakness or dizziness  MUSCULOSKELETAL: no myalgias     Vital Signs Last 24 Hrs  T(C): 37.1 (21 Sep 2024 20:13), Max: 38.1 (20 Sep 2024 22:30)  T(F): 98.8 (21 Sep 2024 20:13), Max: 100.6 (20 Sep 2024 22:30)  HR: 61 (21 Sep 2024 20:00) (54 - 77)  BP: 141/71 (21 Sep 2024 20:00) (85/52 - 159/77)  BP(mean): 95 (21 Sep 2024 20:00) (63 - 106)  RR: 26 (21 Sep 2024 20:00) (19 - 34)  SpO2: 93% (21 Sep 2024 20:00) (91% - 99%)    Parameters below as of 21 Sep 2024 19:00  Patient On (Oxygen Delivery Method): room air        PHYSICAL EXAM:  GENERAL: NAD  HEENT:  anicteric, moist mucous membranes  CHEST/LUNG:  CTA b/l, no rales, wheezes, or rhonchi  HEART:  RRR, S1, S2  ABDOMEN:  BS+, soft, nontender, nondistended  EXTREMITIES: no edema, cyanosis, or calf tenderness  NERVOUS SYSTEM: answers questions and follows commands appropriately    LABS:                        8.9    11.20 )-----------( 182      ( 21 Sep 2024 05:45 )             27.4     CBC Full  -  ( 21 Sep 2024 05:45 )  WBC Count : 11.20 K/uL  Hemoglobin : 8.9 g/dL  Hematocrit : 27.4 %  Platelet Count - Automated : 182 K/uL  Mean Cell Volume : 87.5 fl  Mean Cell Hemoglobin : 28.4 pg  Mean Cell Hemoglobin Concentration : 32.5 gm/dL  Auto Neutrophil # : 7.82 K/uL  Auto Lymphocyte # : 2.08 K/uL  Auto Monocyte # : 0.89 K/uL  Auto Eosinophil # : 0.29 K/uL  Auto Basophil # : 0.08 K/uL  Auto Neutrophil % : 69.8 %  Auto Lymphocyte % : 18.6 %  Auto Monocyte % : 7.9 %  Auto Eosinophil % : 2.6 %  Auto Basophil % : 0.7 %    21 Sep 2024 05:45    140    |  110    |  25     ----------------------------<  138    3.4     |  24     |  1.40     Ca    7.5        21 Sep 2024 05:45  Phos  2.7       21 Sep 2024 05:45  Mg     1.5       21 Sep 2024 05:45    TPro  6.2    /  Alb  3.2    /  TBili  0.6    /  DBili  x      /  AST  14     /  ALT  23     /  AlkPhos  72     21 Sep 2024 05:45      Urinalysis Basic - ( 21 Sep 2024 05:45 )    Color: x / Appearance: x / SG: x / pH: x  Gluc: 138 mg/dL / Ketone: x  / Bili: x / Urobili: x   Blood: x / Protein: x / Nitrite: x   Leuk Esterase: x / RBC: x / WBC x   Sq Epi: x / Non Sq Epi: x / Bacteria: x      CAPILLARY BLOOD GLUCOSE      POCT Blood Glucose.: 185 mg/dL (21 Sep 2024 21:18)  POCT Blood Glucose.: 150 mg/dL (21 Sep 2024 17:33)  POCT Blood Glucose.: 167 mg/dL (21 Sep 2024 12:30)  POCT Blood Glucose.: 158 mg/dL (21 Sep 2024 08:32)        Urinalysis with Rflx Culture (collected 09-19-24 @ 19:04)    Culture - Urine (collected 09-19-24 @ 19:04)  Source: Clean Catch  Preliminary Report (09-21-24 @ 09:44):    >100,000 CFU/ml Escherichia coli    Culture - Blood (collected 09-19-24 @ 18:45)  Source: .Blood Blood-Peripheral  Preliminary Report (09-21-24 @ 02:01):    No growth at 24 hours    Culture - Blood (collected 09-19-24 @ 18:45)  Source: .Blood Blood-Peripheral  Preliminary Report (09-21-24 @ 02:01):    No growth at 24 hours        RADIOLOGY & ADDITIONAL TESTS:    Personally reviewed.     Consultant(s) Notes Reviewed:  [x] YES  [ ] NO

## 2024-09-21 NOTE — DISCHARGE NOTE PROVIDER - PROVIDER TOKENS
PROVIDER:[TOKEN:[[id number]],FOLLOWUP:[2 weeks],ESTABLISHEDPATIENT:[T]],PROVIDER:[TOKEN:[[id number]],FOLLOWUP:[2 weeks],ESTABLISHEDPATIENT:[T]]

## 2024-09-21 NOTE — CONSULT NOTE ADULT - REASON FOR ADMISSION
increased fatigue,  high temperature

## 2024-09-21 NOTE — PROGRESS NOTE ADULT - ASSESSMENT
78F PMHx T2DM, HTN, HLD, psoriasis admitted to MICU with:  Assessment:  1. Septic Shock  2. UTI  3. ELDER  4. Lactic Acidosis     Plan:  NEURO:   -Monitor mental status closely, avoid neurosuppresants.  -Serial neurologic assessments.     CV:   - pressures improved  - s/p albumin x 2 ( 9/21)  - Midodrine 10mg q8h.   -Keep K~4 and Mg>2 for optimal arrhythmia suppression.  -ASA/Lipitor QD.  -Official TTE ordered for AM.     PULM:   - Saturating well on RA      GI:   -CC Diet.    RENAL:   - Renal function currently stable at 1.4   - likely pre-renal from hypovolemia vs. ATN iso shock state.   - f/u US of kidneys  -I's and O's, goal UOP 0.5 cc/kg/hr  -renal dose meds and avoid nephrotoxins     ENDO:   -Aggressive glycemic control to limit FS glucose to <180mg/dl; ISS,   - re evaluate use of SGLT-2 inhibtors with outpatient endo provider  - Lantus 12u     ID:   -UA positive.   - Cx e.coli    - c/w zosyn ( 9/20 started) d/c vanc      HEME:   -VTE ppx with [facility].     GOC: Full Code.

## 2024-09-21 NOTE — DISCHARGE NOTE PROVIDER - NSDCCPCAREPLAN_GEN_ALL_CORE_FT
PRINCIPAL DISCHARGE DIAGNOSIS  Diagnosis: Urinary tract infection with fever  Assessment and Plan of Treatment: please complete course of anibiotic as prescribed   f/u with PCP, ID     PRINCIPAL DISCHARGE DIAGNOSIS  Diagnosis: Urinary tract infection with fever  Assessment and Plan of Treatment: please complete course of anibiotic as prescribed   f/u with PCP. PICC line can be removed after Ivanz finishes. Have PCP perform basic blood work after antibiotics finish      SECONDARY DISCHARGE DIAGNOSES  Diagnosis: Stable diabetes mellitus  Assessment and Plan of Treatment: Follow up with your endocrinologist, but for now we recommend stopping Jardiance and Glimepiride.    Diagnosis: Benign essential HTN  Assessment and Plan of Treatment: Can continue with home BP medications, but would monitor closely. If BP is low would ask PCP which medications should be held.    Diagnosis: Leg wound, left  Assessment and Plan of Treatment: It does not currently look infected. Please follow up in our wound care clinic (or your dermatologist).

## 2024-09-21 NOTE — DISCHARGE NOTE PROVIDER - CARE PROVIDER_API CALL
Sheryl Wyatt  Endocrinology/Metab/Diabetes  [address] [facility], [address]  [place] [address]  Phone: [phone]  Fax: [phone]  Established Patient  Follow Up Time: 2 weeks    [facility]  [address] [place] [address]  Phone: [phone]  Fax: [phone]  Established Patient  Follow Up Time: 2 weeks

## 2024-09-21 NOTE — DISCHARGE NOTE PROVIDER - NSFOLLOWUPCLINICS_GEN_ALL_ED_FT
[facility]  Wound Care  [address] [place] [address]  Phone: [phone]  Fax:   Follow Up Time: 2 weeks

## 2024-09-21 NOTE — CONSULT NOTE ADULT - CONSULT REASON
CKD
Fever/ cUTI
dm2 uncontrolled
Hypotension
78y A1C with Estimated Average Glucose Result: 7.5 % (09-20-24 @ 09:00)   diabetes mellitus uncontrolled type 2

## 2024-09-21 NOTE — DISCHARGE NOTE PROVIDER - HOSPITAL COURSE
HPI:  78 F hx IDDM, HTN, HLD, presents due to generalized weakness, fever. Pt was feeling weak/tired, fell asleep in her car, in the heat for over 2 hours. Throughout day, she was unable to do her daily tasks secondary to the fatigue. 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 This level of fatigue worried family and prompted them to bring her in to ED. In ED, she was found to be febrile to 102. She states she sometimes feels fatigue after taking her home medications which include many diabetic drugs; however, she has not had any changes in medication in recent time besides a monthly injection for her psoriasis.    In ED was given one dose of ceftriaxone for a positive UA (nitrites, Too numerous bacteria to count though squamous epithelial cells were present and WBC of 25). RVP was negative. Elevated WBC count of 13.33 with Neutrophil % of 82.2. Septic LEO was done (19 Sep 2024 22:55)      ---  HOSPITAL COURSE: Patient admitted to medicine floor for management of sepsis uti      Patient directly admitted to the ICU 2/2 soft bp unresponsive to fluids. Patient was given albumin 100mg x2 with moderate improvement of BP. Patient began midodrine 10mg TID and was initially placed on empiric abx consistent of vacomycin and zosyn. Cultures positive for e. coli and vancomycin was discontinued. Patient was noted to have and ELDER on admission which was resistant to improvement to fluids. An US of the kidneys were ordered and illustrated ***.     Pt seen and examined on day of discharge. Patient is medically optimized for discharge to home with close outpatient followup.    PHYSICAL EXAM ON DAY OF DISCHARGE:  The patient was seen and examined on the day of discharge. Please see progress note from day of discharge for further information.         ---  CONSULTANTS:     ---  TIME SPENT:  I, the attending physician, was physically present for the key portions of the evaluation and management (E/M) service provided. The total amount of time spent reviewing the hospital notes, laboratory values, imaging findings, assessing/counseling the patient, discussing with consultant physicians, social work, nursing staff was -- minutes    ---  Primary care provider was made aware of plan for discharge:      [  ] NO     [  ] YES   HPI:  78 F hx IDDM, HTN, HLD, presents due to generalized weakness, fever. Pt was feeling weak/tired, fell asleep in her car, in the heat for over 2 hours. Throughout day, she was unable to do her daily tasks secondary to the fatigue. 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 This level of fatigue worried family and prompted them to bring her in to ED. In ED, she was found to be febrile to 102. She states she sometimes feels fatigue after taking her home medications which include many diabetic drugs; however, she has not had any changes in medication in recent time besides a monthly injection for her psoriasis.    In ED was given one dose of ceftriaxone for a positive UA (nitrites, Too numerous bacteria to count though squamous epithelial cells were present and WBC of 25). RVP was negative. Elevated WBC count of 13.33 with Neutrophil % of 82.2. Septic LEO was done (19 Sep 2024 22:55)      ---  HOSPITAL COURSE: Patient admitted to medicine floor for management of sepsis uti      Patient directly admitted to the ICU 2/2 soft bp unresponsive to fluids. Patient was given albumin 100mg x2 with moderate improvement of BP. Patient began midodrine 10mg TID and was initially placed on empiric abx consistent of vacomycin and zosyn. Cultures positive for e. coli and vancomycin was discontinued. Patient was noted to have and ELDER on admission which was resistant to improvement to fluids. An US of the kidneys were ordered and noted negative for acute findings but showed renal cyst and angiomyolipoma. MRI abdomen performed and showed .....     Urine Culture positive for ESBL E coli, ID suggested PICC placement and Invanz x 7 days total. PICC line placed. Will need Home care     Pt seen and examined on day of discharge. Patient is medically optimized for discharge to home with close outpatient followup.    PHYSICAL EXAM ON DAY OF DISCHARGE:  The patient was seen and examined on the day of discharge. Please see progress note from day of discharge for further information.         ---  CONSULTANTS:     ---  TIME SPENT:  I, the attending physician, was physically present for the key portions of the evaluation and management (E/M) service provided. The total amount of time spent reviewing the hospital notes, laboratory values, imaging findings, assessing/counseling the patient, discussing with consultant physicians, social work, nursing staff was -- minutes    ---  Primary care provider was made aware of plan for discharge:      [  ] NO     [  ] YES   HPI:  78 F hx IDDM, HTN, HLD, presents due to generalized weakness, fever. Pt was feeling weak/tired, fell asleep in her car, in the heat for over 2 hours. Throughout day, she was unable to do her daily tasks secondary to the fatigue. 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 This level of fatigue worried family and prompted them to bring her in to ED. In ED, she was found to be febrile to 102. She states she sometimes feels fatigue after taking her home medications which include many diabetic drugs; however, she has not had any changes in medication in recent time besides a monthly injection for her psoriasis.    In ED was given one dose of ceftriaxone for a positive UA (nitrites, Too numerous bacteria to count though squamous epithelial cells were present and WBC of 25). RVP was negative. Elevated WBC count of 13.33 with Neutrophil % of 82.2. Septic LEO was done (19 Sep 2024 22:55)      ---  HOSPITAL COURSE: Patient admitted to medicine floor for management of sepsis uti      Patient directly admitted to the ICU 2/2 soft bp unresponsive to fluids. Patient was given albumin 100mg x2 with moderate improvement of BP. Patient began midodrine 10mg TID and was initially placed on empiric abx consistent of vacomycin and zosyn. Cultures positive for e. coli and vancomycin was discontinued. Patient was noted to have and ELDER on admission which was resistant to improvement to fluids. An US of the kidneys were ordered and noted negative for acute findings but showed renal cyst and angiomyolipoma. MRI abdomen performed and showed likely angiomyolipoma    Urine Culture positive for ESBL E coli, ID suggested PICC placement and Invanz x 7 days total. PICC line placed. Will need Home care. Also augmentin for enterococcus. Outpatient wound care follow up    Pt seen and examined on day of discharge. Patient is medically optimized for discharge to home with close outpatient followup.    PHYSICAL EXAM ON DAY OF DISCHARGE:  The patient was seen and examined on the day of discharge. Please see progress note from day of discharge for further information.         ---  CONSULTANTS:     ---  TIME SPENT:  I, the attending physician, was physically present for the key portions of the evaluation and management (E/M) service provided. The total amount of time spent reviewing the hospital notes, laboratory values, imaging findings, assessing/counseling the patient, discussing with consultant physicians, social work, nursing staff was -- minutes    ---  Primary care provider was made aware of plan for discharge:      [  ] NO     [  ] YES

## 2024-09-21 NOTE — CHART NOTE - NSCHARTNOTEFT_GEN_A_CORE
: Ken    INDICATION: Shock    PROCEDURE:  [ ] LIMITED ECHO  [ X] LIMITED CHEST  [ ] LIMITED RETROPERITONEAL  [ X] LIMITED ABDOMINAL  [ ] LIMITED DVT  [ ] NEEDLE GUIDANCE VASCULAR  [ ] NEEDLE GUIDANCE THORACENTESIS  [ ] NEEDLE GUIDANCE PARACENTESIS  [ ] NEEDLE GUIDANCE PERICARDIOCENTESIS  [ ] OTHER    FINDINGS:  Grossly normal LVSF. RV, LV. NO pericardial effusion noted. IVC 2.2 cm.   No evidence of hydronephrosis bilaterally. 1cm left sided cyst/mass noted on renal cortex- hyperechoic.    INTERPRETATION:  Normal GDE. NO hydro. Follow up official renal US.    Images stored on [employer].

## 2024-09-21 NOTE — PROGRESS NOTE ADULT - ASSESSMENT
78F PMHx T2DM, HTN, HLD, psoriasis admitted to MICU with:  Assessment:  1. Septic Shock  2. UTI  3. ELDER  4. Lactic Acidosis     Plan:  NEURO:   -Monitor mental status closely, avoid neurosuppresants.  -Serial neurologic assessments.     CV:   -Hypotensive this evening with MAP low 60s s/p evening Midodrine dose off IV vasopressor therapy. Albumin 25% 100cc IVPB q4h x2 ordered tonight to facilitate staying off IV vasopressors. Maintain goal MAP >65. Low threshold to reinstitute to maintain goal MAP. Monitor end points of organ perfusion; lactate slightly downtrending, repeat until cleared.    Keep K~4 and Mg>2 for optimal arrhythmia suppression.    PULM: Satting well on __, will utilize supplemental O2 PRN to maintain goal SpO2 >88%. Incentive spirometry, Chest PT/Pulmonary toilet, HOB >30'. Albuterol PRN.     GI: Diet. Protonix QD.    RENAL: Renal function currently WNL. Trend lytes/Scr daily with BMP, Strict I's and O's, goal UOP 0.5 cc/kg/hr, renal dose meds and avoid nephrotoxins.    ENDO: Aggressive glycemic control to limit FS glucose to <180mg/dl. Keep Euthyroid.     ID: ABX use and/or discontinuation based on discussion with ID in conjunction with clinical features, culture data, and judicious procalcitonin monitoring.    HEME: Pharmacologic DVT Prophylaxis in addition to SCD's w/ __    GOC:   78F PMHx T2DM, HTN, HLD, psoriasis admitted to MICU with:  Assessment:  1. Septic Shock  2. UTI  3. ELDER  4. Lactic Acidosis     Plan:  NEURO:   -Monitor mental status closely, avoid neurosuppresants.  -Serial neurologic assessments.     CV:   -Hypotensive this evening with MAP low 60s s/p evening Midodrine dose off IV vasopressor therapy. Albumin 25% 100cc IVPB q4h x2 ordered tonight to facilitate staying off IV vasopressors. Maintain goal MAP >65. Low threshold to reinstitute to maintain goal MAP. Monitor end points of organ perfusion; lactate slightly downtrending, repeat until cleared. Midodrine 10mg q8h.   -Keep K~4 and Mg>2 for optimal arrhythmia suppression.  -ASA/Lipitor QD.  -Official TTE ordered for AM.     PULM:   -Satting well on RA, will utilize supplemental O2 PRN to maintain goal SpO2 >88%.   -Incentive spirometry, Chest PT/Pulmonary toilet, HOB >30'.    GI:   -CC Diet.    RENAL:   -Renal function currently with improved ELDER, likely pre-renal from hypovolemia vs. ATN iso shock state.   -trend lytes/Scr daily with BMP  -I's and O's, goal UOP 0.5 cc/kg/hr  -renal dose meds and avoid nephrotoxins     ENDO:   -Aggressive glycemic control to limit FS glucose to <180mg/dl; ISS, Lantus.     ID:   -Defervescing. UA positive. Cx sent - pending. Empiric Vancomycin / Zosyn courses.    -ABX use and/or discontinuation based on discussion with ID in conjunction with clinical features, culture data, and judicious procalcitonin monitoring.    HEME:   -VTE ppx with SQH.     GOC: Full Code.

## 2024-09-21 NOTE — CONSULT NOTE ADULT - PROBLEM SELECTOR RECOMMENDATION 9
cont lantus 12 units qam  cont admelog corrective scale coverage qac/qhs  cont cons cho diet  bg goal 140-180 in icu setting
Type 2 A1c 7.5% adm UTI w/ fever  increase Lantus to 12 units in AM  c/w KIERSTEN ACHS  CC diet and accucheck ACHS  Recommend endocrine-Perlman onconsult  FU appt: TBA  DSC recommendations: return to home  toujeo 12 units daily, mounjarno 10mg weekly, glimepiride 2 mg daily, jardiance 25mg daily,  metformin 1000mg BID,and glucose monitoring, lifestyle and diet modificaitons  diabetes education provided as documented above  Diabetes support info and cell # [phone] given   Goal 100-180 mg/dL; 140-180 mg/dL in critical care areas

## 2024-09-21 NOTE — DISCHARGE NOTE PROVIDER - NSDCMRMEDTOKEN_GEN_ALL_CORE_FT
aspirin 81 mg oral capsule: 1 cap(s) orally once a day  atorvastatin 40 mg oral tablet: 1 tab(s) orally once a day  Bimzelx Autoinjector 160 mg/mL subcutaneous injection: 2 milliliter(s) subcutaneously every 30 days  febuxostat 40 mg oral tablet: 1 tab(s) orally once a day  glimepiride 2 mg oral tablet: 1 tab(s) orally once a day  Jardiance 25 mg oral tablet: 1 tab(s) orally once a day  lisinopril 20 mg oral tablet: 1 tab(s) orally once a day  Lyrica 75 mg oral capsule: 1 cap(s) orally every 12 hours  metFORMIN 500 mg oral tablet, extended release: 1 tab(s) orally 4 times a day (before meals and at bedtime)  metoprolol succinate 50 mg oral capsule, extended release: 1 cap(s) orally once a day  Mounjaro 10 mg/0.5 mL subcutaneous solution: 10 milligram(s) subcutaneously every 7 days  pyridoxine: 1 tab(s) once a day  Toujeo Max SoloStar 300 units/mL subcutaneous solution: 12 unit(s) subcutaneous once a day  triamterene-hydrochlorothiazide 37.5 mg-25 mg oral capsule: 1 cap(s) orally once a day (at bedtime)  Vascepa 1 g oral capsule: 2 cap(s) orally once a day   aspirin 81 mg oral capsule: 1 cap(s) orally once a day  atorvastatin 40 mg oral tablet: 1 tab(s) orally once a day  Bimzelx Autoinjector 160 mg/mL subcutaneous injection: 2 milliliter(s) subcutaneously every 30 days  febuxostat 40 mg oral tablet: 1 tab(s) orally once a day  lisinopril 20 mg oral tablet: 1 tab(s) orally once a day  Lyrica 75 mg oral capsule: 1 cap(s) orally every 12 hours  metFORMIN 500 mg oral tablet, extended release: 1 tab(s) orally 4 times a day (before meals and at bedtime)  metoprolol succinate 50 mg oral capsule, extended release: 1 cap(s) orally once a day  Mounjaro 10 mg/0.5 mL subcutaneous solution: 10 milligram(s) subcutaneously every 7 days  pyridoxine: 1 tab(s) once a day  Toujeo Max SoloStar 300 units/mL subcutaneous solution: 12 unit(s) subcutaneous once a day  triamterene-hydrochlorothiazide 37.5 mg-25 mg oral capsule: 1 cap(s) orally once a day (at bedtime)  Vascepa 1 g oral capsule: 2 cap(s) orally once a day

## 2024-09-21 NOTE — CONSULT NOTE ADULT - SUBJECTIVE AND OBJECTIVE BOX
Patient is a 78y old  Female who presents with a chief complaint of increased fatigue,  high temperature (21 Sep 2024 12:17)      Reason For Consult: dm2 uncontrolled    HPI:  78 F hx IDDM, HTN, HLD, presents due to generalized weakness, fever. Pt was feeling weak/tired, fell asleep in her car, in the heat for over 2 hours. Throughout day, she was unable to do her daily tasks secondary to the fatigue. 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 This level of fatigue worried family and prompted them to bring her in to ED. In ED, she was found to be febrile to 102. She states she sometimes feels fatigue after taking her home medications which include many diabetic drugs; however, she has not had any changes in medication in recent time besides a monthly injection for her psoriasis.    In ED was given one dose of ceftriaxone for a positive UA (nitrites, Too numerous bacteria to count though squamous epithelial cells were present and WBC of 25). RVP was negative. Elevated WBC count of 13.33 with Neutrophil % of 82.2. Septic LEO was done (19 Sep 2024 22:55)      PAST MEDICAL & SURGICAL HISTORY:  DM (diabetes mellitus)      Benign essential HTN      H/O Bell's palsy      Gout      HLD (hyperlipidemia)      Psoriasis      No significant past surgical history          FAMILY HISTORY:        Social History:    MEDICATIONS  (STANDING):  aspirin enteric coated 81 milliGRAM(s) Oral daily  atorvastatin 40 milliGRAM(s) Oral at bedtime  dextrose 5%. 1000 milliLiter(s) (50 mL/Hr) IV Continuous <Continuous>  dextrose 5%. 1000 milliLiter(s) (100 mL/Hr) IV Continuous <Continuous>  dextrose 50% Injectable 12.5 Gram(s) IV Push once  dextrose 50% Injectable 25 Gram(s) IV Push once  dextrose 50% Injectable 25 Gram(s) IV Push once  glucagon  Injectable 1 milliGRAM(s) IntraMuscular once  heparin   Injectable 5000 Unit(s) SubCutaneous every 12 hours  insulin glargine Injectable (LANTUS) 12 Unit(s) SubCutaneous every morning  insulin lispro (ADMELOG) corrective regimen sliding scale   SubCutaneous at bedtime  insulin lispro (ADMELOG) corrective regimen sliding scale   SubCutaneous three times a day before meals  midodrine. 10 milliGRAM(s) Oral three times a day  multivitamin/minerals 1 Tablet(s) Oral daily  piperacillin/tazobactam IVPB.. 3.375 Gram(s) IV Intermittent every 8 hours  pregabalin 75 milliGRAM(s) Oral every 12 hours    MEDICATIONS  (PRN):  acetaminophen     Tablet .. 650 milliGRAM(s) Oral every 6 hours PRN Temp greater or equal to 38C (100.4F), Mild Pain (1 - 3)  dextrose Oral Gel 15 Gram(s) Oral once PRN Blood Glucose LESS THAN 70 milliGRAM(s)/deciliter  melatonin 3 milliGRAM(s) Oral at bedtime PRN Insomnia        T(C): 38 (09-21-24 @ 11:54), Max: 38.5 (09-20-24 @ 21:00)  HR: 57 (09-21-24 @ 12:00) (57 - 77)  BP: 136/63 (09-21-24 @ 12:00) (80/40 - 142/63)  RR: 22 (09-21-24 @ 12:00) (17 - 34)  SpO2: 92% (09-21-24 @ 12:00) (91% - 99%)  Wt(kg): --    PHYSICAL EXAM:  GENERAL: NAD, well-groomed, well-developed  HEAD:  Atraumatic, Normocephalic  NECK: Supple, No JVD, Normal thyroid  CHEST/LUNG: Clear to percussion bilaterally; No rales, rhonchi, wheezing, or rubs  HEART: Regular rate and rhythm; No murmurs, rubs, or gallops  ABDOMEN: Soft, Nontender, Nondistended; Bowel sounds present  EXTREMITIES:  2+ Peripheral Pulses, No clubbing, cyanosis, or edema  SKIN: No rashes or lesions    CAPILLARY BLOOD GLUCOSE      POCT Blood Glucose.: 167 mg/dL (21 Sep 2024 12:30)  POCT Blood Glucose.: 158 mg/dL (21 Sep 2024 08:32)  POCT Blood Glucose.: 216 mg/dL (20 Sep 2024 21:02)  POCT Blood Glucose.: 216 mg/dL (20 Sep 2024 17:36)                            8.9    11.20 )-----------( 182      ( 21 Sep 2024 05:45 )             27.4       CMP:  09-21 @ 05:45  SGPT 23  Albumin 3.2   Alk Phos 72   Anion Gap 6   SGOT 14   Total Bili 0.6   BUN 25   Calcium Total 7.5   CO2 24   Chloride 110   Creatinine 1.40   eGFR if AA --   eGFR if non AA --   Glucose 138   Potassium 3.4   Protein 6.2   Sodium 140      Thyroid Function Tests:      Diabetes Tests:       Radiology:

## 2024-09-21 NOTE — CONSULT NOTE ADULT - SUBJECTIVE AND OBJECTIVE BOX
[facility]                             Nephrology and Hypertension                             Christopher Cooper                                          [phone]     Patient is a 78y old  Female who presents with a chief complaint of increased fatigue,  high temperature (21 Sep 2024 00:17)       HPI:  78 F hx IDDM, HTN, HLD, presents due to generalized weakness, fever. Pt was feeling weak/tired, fell asleep in her car, in the heat for over 2 hours. Throughout day, she was unable to do her daily tasks secondary to the fatigue. 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 This level of fatigue worried family and prompted them to bring her in to ED. In ED, she was found to be febrile to 102. She states she sometimes feels fatigue after taking her home medications which include many diabetic drugs; however, she has not had any changes in medication in recent time besides a monthly injection for her psoriasis.    In ED was given one dose of ceftriaxone for a positive UA (nitrites, Too numerous bacteria to count though squamous epithelial cells were present and WBC of 25). RVP was negative. Elevated WBC count of 13.33 with Neutrophil % of 82.2. Septic LEO was done (19 Sep 2024 22:55)    Renal consulted for CKD and UTI. 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 Family at bedside        PAST MEDICAL & SURGICAL HISTORY:  DM (diabetes mellitus)      Benign essential HTN      H/O Bell's palsy      Gout      HLD (hyperlipidemia)      Psoriasis      No significant past surgical history           FAMILY HISTORY:  NC    Social History:Non smoker    MEDICATIONS  (STANDING):  aspirin enteric coated 81 milliGRAM(s) Oral daily  atorvastatin 40 milliGRAM(s) Oral at bedtime  dextrose 5%. 1000 milliLiter(s) (50 mL/Hr) IV Continuous <Continuous>  dextrose 5%. 1000 milliLiter(s) (100 mL/Hr) IV Continuous <Continuous>  dextrose 50% Injectable 25 Gram(s) IV Push once  dextrose 50% Injectable 25 Gram(s) IV Push once  dextrose 50% Injectable 12.5 Gram(s) IV Push once  glucagon  Injectable 1 milliGRAM(s) IntraMuscular once  heparin   Injectable 5000 Unit(s) SubCutaneous every 12 hours  insulin glargine Injectable (LANTUS) 12 Unit(s) SubCutaneous every morning  insulin lispro (ADMELOG) corrective regimen sliding scale   SubCutaneous at bedtime  insulin lispro (ADMELOG) corrective regimen sliding scale   SubCutaneous three times a day before meals  magnesium sulfate  IVPB 2 Gram(s) IV Intermittent once  midodrine. 10 milliGRAM(s) Oral three times a day  multivitamin/minerals 1 Tablet(s) Oral daily  norepinephrine Infusion 0.05 MICROgram(s)/kG/Min (8.25 mL/Hr) IV Continuous <Continuous>  piperacillin/tazobactam IVPB.. 3.375 Gram(s) IV Intermittent every 8 hours  potassium chloride    Tablet ER 40 milliEquivalent(s) Oral once  pregabalin 75 milliGRAM(s) Oral every 12 hours  sodium chloride 0.9%. 1000 milliLiter(s) (100 mL/Hr) IV Continuous <Continuous>  vancomycin  IVPB 1250 milliGRAM(s) IV Intermittent every 24 hours    MEDICATIONS  (PRN):  acetaminophen     Tablet .. 650 milliGRAM(s) Oral every 6 hours PRN Temp greater or equal to 38C (100.4F), Mild Pain (1 - 3)  dextrose Oral Gel 15 Gram(s) Oral once PRN Blood Glucose LESS THAN 70 milliGRAM(s)/deciliter  melatonin 3 milliGRAM(s) Oral at bedtime PRN Insomnia   Meds reviewed    Allergies    No Known Allergies    Intolerances         REVIEW OF SYSTEMS: As per HPI, otherwise negative      Vital Signs Last 24 Hrs  T(C): 37.8 (21 Sep 2024 08:45), Max: 38.5 (20 Sep 2024 21:00)  T(F): 100 (21 Sep 2024 08:45), Max: 101.3 (20 Sep 2024 21:00)  HR: 64 (21 Sep 2024 07:30) (58 - 84)  BP: 121/57 (21 Sep 2024 07:30) (74/50 - 142/63)  BP(mean): 83 (21 Sep 2024 07:30) (63 - 95)  RR: 21 (21 Sep 2024 07:30) (14 - 34)  SpO2: 94% (21 Sep 2024 07:30) (91% - 99%)    Parameters below as of 21 Sep 2024 07:30  Patient On (Oxygen Delivery Method): room air      Daily Height in cm: 160.02 (20 Sep 2024 16:35)    Daily Weight in k (21 Sep 2024 05:30)    PHYSICAL EXAM:    GENERAL: NAD  HEAD:  Atraumatic, Normocephalic  EYES: EOMI, conjunctiva and sclera clear  ENMT: No Drainage from nares, No drainage from ears  NECK: Supple, neck  veins full  NERVOUS SYSTEM:  Awake and Alert  CHEST/LUNG: Clear to percussion bilaterally; No rales, rhonchi, wheezing, or rubs  HEART: Regular rate and rhythm; No murmurs, rubs, or gallops  ABDOMEN: Soft, Nontender, Nondistended; Bowel sounds present  EXTREMITIES:  No Edema  SKIN: No rashes No obvious ecchymosis      LABS:                        8.9    11.20 )-----------( 182      ( 21 Sep 2024 05:45 )             27.4         140  |  110[H]  |  25[H]  ----------------------------<  138[H]  3.4[L]   |  24  |  1.40[H]    Ca    7.5[L]      21 Sep 2024 05:45  Phos  2.7       Mg     1.5         TPro  6.2  /  Alb  3.2[L]  /  TBili  0.6  /  DBili  x   /  AST  14[L]  /  ALT  23  /  AlkPhos  72      PT/INR - ( 19 Sep 2024 18:45 )   PT: 11.8 sec;   INR: 1.04 ratio         PTT - ( 19 Sep 2024 18:45 )  PTT:34.1 sec  Urinalysis Basic - ( 21 Sep 2024 05:45 )    Color: x / Appearance: x / SG: x / pH: x  Gluc: 138 mg/dL / Ketone: x  / Bili: x / Urobili: x   Blood: x / Protein: x / Nitrite: x   Leuk Esterase: x / RBC: x / WBC x   Sq Epi: x / Non Sq Epi: x / Bacteria: x      Magnesium: 1.5 mg/dL ( @ 05:45)  Phosphorus: 2.7 mg/dL ( @ 05:45)          RADIOLOGY & ADDITIONAL TESTS:

## 2024-09-21 NOTE — PROGRESS NOTE ADULT - SUBJECTIVE AND OBJECTIVE BOX
HORACE PERKINS is a 78yFemale , patient examined and chart reviewed.     INTERVAL HPI/ OVERNIGHT EVENTS:  Feeling better. Afebrile.  Hypotension resolved - did not require pressors.  Family at bedside.    Past Medical History--  PAST MEDICAL & SURGICAL HISTORY:  DM (diabetes mellitus)  Benign essential HTN  H/O Bell's palsy  Gout  HLD (hyperlipidemia)  Psoriasis    For details regarding the patient's social history, family history, and other miscellaneous elements, please refer the initial infectious diseases consultation and/or the admitting history and physical examination for this admission.    ROS:  CONSTITUTIONAL:  Negative fever or chills  EYES:  Negative  blurry vision or double vision  CARDIOVASCULAR:  Negative for chest pain or palpitations  RESPIRATORY:  Negative for cough, wheezing, or SOB   GASTROINTESTINAL:  Negative for nausea, vomiting, diarrhea, constipation, or abdominal pain  GENITOURINARY:  Negative frequency, urgency , dysuria or hematuria   NEUROLOGIC:  No headache, confusion, dizziness, lightheadedness  All other systems were reviewed and are negative     No Known Allergies      Current inpatient medications :    ANTIBIOTICS/RELEVANT:  piperacillin/tazobactam IVPB.. 3.375 Gram(s) IV Intermittent every 8 hours      acetaminophen     Tablet .. 650 milliGRAM(s) Oral every 6 hours PRN  aspirin enteric coated 81 milliGRAM(s) Oral daily  atorvastatin 40 milliGRAM(s) Oral at bedtime  dextrose 5%. 1000 milliLiter(s) IV Continuous <Continuous>  dextrose 5%. 1000 milliLiter(s) IV Continuous <Continuous>  dextrose 50% Injectable 25 Gram(s) IV Push once  dextrose 50% Injectable 12.5 Gram(s) IV Push once  dextrose 50% Injectable 25 Gram(s) IV Push once  dextrose Oral Gel 15 Gram(s) Oral once PRN  glucagon  Injectable 1 milliGRAM(s) IntraMuscular once  heparin   Injectable 5000 Unit(s) SubCutaneous every 12 hours  insulin glargine Injectable (LANTUS) 12 Unit(s) SubCutaneous every morning  insulin lispro (ADMELOG) corrective regimen sliding scale   SubCutaneous at bedtime  insulin lispro (ADMELOG) corrective regimen sliding scale   SubCutaneous three times a day before meals  melatonin 3 milliGRAM(s) Oral at bedtime PRN  midodrine. 10 milliGRAM(s) Oral three times a day  multivitamin/minerals 1 Tablet(s) Oral daily  pregabalin 75 milliGRAM(s) Oral every 12 hours      Objective:    09-20 @ 07:01  -  09-21 @ 07:00  --------------------------------------------------------  IN: 2550 mL / OUT: 480 mL / NET: 2070 mL    09-21 @ 07:01  -  09-21 @ 16:21  --------------------------------------------------------  IN: 675 mL / OUT: 0 mL / NET: 675 mL      T(C): 36.9 (09-21-24 @ 15:25), Max: 38.5 (09-20-24 @ 21:00)  HR: 66 (09-21-24 @ 16:00) (57 - 77)  BP: 127/79 (09-21-24 @ 16:00) (85/52 - 142/63)  RR: 25 (09-21-24 @ 16:00) (19 - 34)  SpO2: 96% (09-21-24 @ 16:00) (91% - 99%)      Physical Exam:  GEN: NAD, pleasant  HEENT: normocephalic and atraumatic.   NECK: Supple.   LUNGS: Clear to auscultation.  HEART: Regular rate and rhythm without murmur.  ABDOMEN: Soft, nontender, and nondistended.  Positive bowel sounds.    EXTREMITIES: Without any cyanosis, clubbing, rash, lesions or edema.  NEUROLOGIC: A & O x3, No focal neurological deficits   SKIN: No rash    LABS:                        8.9    11.20 )-----------( 182      ( 21 Sep 2024 05:45 )             27.4       09-21    140  |  110[H]  |  25[H]  ----------------------------<  138[H]  3.4[L]   |  24  |  1.40[H]    Ca    7.5[L]      21 Sep 2024 05:45  Phos  2.7     09-21  Mg     1.5     09-21    TPro  6.2  /  Alb  3.2[L]  /  TBili  0.6  /  DBili  x   /  AST  14[L]  /  ALT  23  /  AlkPhos  72  09-21      PT/INR - ( 19 Sep 2024 18:45 )   PT: 11.8 sec;   INR: 1.04 ratio         PTT - ( 19 Sep 2024 18:45 )  PTT:34.1 sec      MICROBIOLOGY:    Urinalysis with Rflx Culture (collected 19 Sep 2024 19:04)    Culture - Urine (collected 19 Sep 2024 19:04)  Source: Clean Catch  Preliminary Report (21 Sep 2024 09:44):    >100,000 CFU/ml Escherichia coli    Culture - Blood (collected 19 Sep 2024 18:45)  Source: .Blood Blood-Peripheral  Preliminary Report (21 Sep 2024 02:01):    No growth at 24 hours    Culture - Blood (collected 19 Sep 2024 18:45)  Source: .Blood Blood-Peripheral  Preliminary Report (21 Sep 2024 02:01):    No growth at 24 hours    RADIOLOGY & ADDITIONAL STUDIES:      Assessment :    78-year-old  female with history of insulin-dependent diabetes mellitus hypertension dyslipidemia who presented with generalized weakness fever confusion being admitted for    Complicated UTI  Rule out pyelonephritis  Rule out bacteremia  High fever  Acute metabolic encephalopathy  Acute kidney injury  Septic shock resolved      Plan:   On Zosyn  Fu cultures  Trend temps and cbc  For renal ultrasound  CT AP when stable    D/w ICU attending    Continue with present regiment.  Appropriate use of antibiotics and adverse effects reviewed.    I have discussed the above plan of care with patient/ family in detail. They expressed understanding of the the treatment plan . Risks, benefits and alternatives discussed in detail. I have asked if they have any questions or concerns and appropriately addressed them to the best of my ability .      Critical care time greater then 35 minutes reviewing notes, labs data/ imaging , discussion with multidisciplinary team.    Thank you for allowing me to participate in care of your patient .        Siobhan Neil MD  Infectious Disease  [phone]

## 2024-09-21 NOTE — CONSULT NOTE ADULT - ASSESSMENT
Physical Exam:   Vital Signs Last 24 Hrs  T(C): 37.8 (20 Sep 2024 15:47), Max: 40.6 (20 Sep 2024 02:00)  T(F): 100.1 (20 Sep 2024 15:47), Max: 105.1 (20 Sep 2024 02:40)  HR: 74 (20 Sep 2024 15:47) (67 - 110)  BP: 90/55 (20 Sep 2024 15:47) (74/50 - 135/62)  BP(mean): --  RR: 17 (20 Sep 2024 15:47) (14 - 18)  SpO2: 92% (20 Sep 2024 15:47) (92% - 98%)    Parameters below as of 20 Sep 2024 15:47  Patient On (Oxygen Delivery Method): room air     CAPILLARY BLOOD GLUCOSE      POCT Blood Glucose.: 317 mg/dL (20 Sep 2024 12:08)  POCT Blood Glucose.: 276 mg/dL (20 Sep 2024 08:14)  POCT Blood Glucose.: 167 mg/dL (19 Sep 2024 22:05)  POCT Blood Glucose.: 205 mg/dL (19 Sep 2024 18:32)      Cholesterol, Serum: 113 mg/dL (05.19.21 @ 08:36)     HDL Cholesterol, Serum: 22 mg/dL (05.19.21 @ 08:36)     LDL Cholesterol Calculated: 66 mg/dL (05.19.21 @ 08:36)     DIET: CC  >50%        
CKD  UTI  Septic shock   DM    -CKD at baseline. 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 No indication for kidney biopsy  -S/p IVF bolus. Continue IVF NS  -Pressors per ICU. keep MAP>65  -ABX renal dose. Renal wise tolerating. No signs of AIN with zosyn   -Urine studies  -Will benefit from restarting RAAS blockade upon dc  -Can continue Metformin with eGFR>30 upon dc  -May have to stop SGLT2i outpatient with urosepsis episode    D/w ICU  D/w family

## 2024-09-21 NOTE — PROGRESS NOTE ADULT - SUBJECTIVE AND OBJECTIVE BOX
Patient is a 78y old  Female who presents with a chief complaint of increased fatigue,  high temperature (20 Sep 2024 18:05)    BRIEF HOSPITAL COURSE:   78F PMHx T2DM, HTN, HLD, psoriasis initially presenting with weakness and lethargy, admitted to medicine with sepsis secondary to UTI c/b worsening fevers, hypotension prompting upgrade to MICU for IV vasopressor therapy.     Events last 24 hours:   Hypotensive this evening with MAP low 60s s/p evening Midodrine dose off IV vasopressor therapy. Defervescing, lactate slightly downtrending.     PAST MEDICAL & SURGICAL HISTORY:  DM (diabetes mellitus)  Benign essential HTN  H/O Bell's palsy  Gout  HLD (hyperlipidemia)  Psoriasis  No significant past surgical history    Review of Systems:  CONSTITUTIONAL: No fever, chills, or fatigue  EYES: No eye pain, visual disturbances, or discharge  ENMT:  No difficulty hearing, tinnitus, vertigo; No sinus or throat pain  NECK: No pain or stiffness  RESPIRATORY: No cough, wheezing, chills or hemoptysis; No shortness of breath  CARDIOVASCULAR: No chest pain, palpitations, dizziness, or leg swelling  GASTROINTESTINAL: No abdominal or epigastric pain. No nausea, vomiting, or hematemesis; No diarrhea or constipation. No melena or hematochezia.  GENITOURINARY: No dysuria, frequency, hematuria, or incontinence  NEUROLOGICAL: No headaches, memory loss, loss of strength, numbness, or tremors  SKIN: No itching, burning, rashes, or lesions   MUSCULOSKELETAL: No joint pain or swelling; No muscle, back, or extremity pain  PSYCHIATRIC: No depression, anxiety, mood swings, or difficulty sleeping    Medications:  piperacillin/tazobactam IVPB.. 3.375 Gram(s) IV Intermittent every 8 hours  vancomycin  IVPB 1250 milliGRAM(s) IV Intermittent every 24 hours  midodrine. 10 milliGRAM(s) Oral three times a day  norepinephrine Infusion 0.05 MICROgram(s)/kG/Min IV Continuous <Continuous>  acetaminophen     Tablet .. 650 milliGRAM(s) Oral every 6 hours PRN  melatonin 3 milliGRAM(s) Oral at bedtime PRN  pregabalin 75 milliGRAM(s) Oral every 12 hours  aspirin enteric coated 81 milliGRAM(s) Oral daily  heparin   Injectable 5000 Unit(s) SubCutaneous every 12 hours  atorvastatin 40 milliGRAM(s) Oral at bedtime  dextrose 50% Injectable 12.5 Gram(s) IV Push once  dextrose 50% Injectable 25 Gram(s) IV Push once  dextrose 50% Injectable 25 Gram(s) IV Push once  dextrose Oral Gel 15 Gram(s) Oral once PRN  glucagon  Injectable 1 milliGRAM(s) IntraMuscular once  insulin glargine Injectable (LANTUS) 12 Unit(s) SubCutaneous every morning  insulin lispro (ADMELOG) corrective regimen sliding scale   SubCutaneous three times a day before meals  insulin lispro (ADMELOG) corrective regimen sliding scale   SubCutaneous at bedtime  albumin human 25% IVPB 100 milliLiter(s) IV Intermittent every 4 hours  dextrose 5%. 1000 milliLiter(s) IV Continuous <Continuous>  dextrose 5%. 1000 milliLiter(s) IV Continuous <Continuous>  multivitamin/minerals 1 Tablet(s) Oral daily  sodium chloride 0.9%. 1000 milliLiter(s) IV Continuous <Continuous>    ICU Vital Signs Last 24 Hrs  T(C): 37.6 (21 Sep 2024 00:00), Max: 40.6 (20 Sep 2024 02:00)  T(F): 99.7 (21 Sep 2024 00:00), Max: 105.1 (20 Sep 2024 02:40)  HR: 67 (20 Sep 2024 23:01) (62 - 110)  BP: 94/52 (20 Sep 2024 23:01) (74/50 - 142/63)  BP(mean): 70 (20 Sep 2024 23:01) (64 - 90)  ABP: --  ABP(mean): --  RR: 23 (20 Sep 2024 23:01) (14 - 34)  SpO2: 92% (20 Sep 2024 23:01) (92% - 98%)  O2 Parameters below as of 20 Sep 2024 21:00  Patient On (Oxygen Delivery Method): room air    I&O's Detail  20 Sep 2024 07:01  -  21 Sep 2024 00:17  --------------------------------------------------------  IN:    IV PiggyBack: 100 mL    IV PiggyBack: 50 mL    Oral Fluid: 200 mL    sodium chloride 0.9%: 700 mL  Total IN: 1050 mL  OUT:    Norepinephrine: 0 mL  Total OUT: 0 mL  Total NET: 1050 mL    LABS:                      10.1   16.64 )-----------( 185      ( 20 Sep 2024 09:00 )             30.9     09-20  140  |  108  |  28[H]  ----------------------------<  269[H]  3.5   |  25  |  1.30  Ca    8.1[L]      20 Sep 2024 09:00  TPro  5.8[L]  /  Alb  2.7[L]  /  TBili  0.7  /  DBili  x   /  AST  17  /  ALT  20  /  AlkPhos  86  09-20    CAPILLARY BLOOD GLUCOSE  POCT Blood Glucose.: 216 mg/dL (20 Sep 2024 21:02)    PT/INR - ( 19 Sep 2024 18:45 )   PT: 11.8 sec;   INR: 1.04 ratio    PTT - ( 19 Sep 2024 18:45 )  PTT:34.1 sec    Urinalysis Basic - ( 20 Sep 2024 09:00 )  Color: x / Appearance: x / SG: x / pH: x  Gluc: 269 mg/dL / Ketone: x  / Bili: x / Urobili: x   Blood: x / Protein: x / Nitrite: x   Leuk Esterase: x / RBC: x / WBC x   Sq Epi: x / Non Sq Epi: x / Bacteria: x    CULTURES:  Rapid RVP Result: NotDetec (09-19-24 @ 18:45)    Physical Examination:  General: Alert, oriented, interactive, nonfocal.  HEENT: PERRL.  NECK: Supple.   PULM: Clear to auscultation bilaterally.  CVS: s1/s2.  ABD: Soft, nondistended, nontender, normoactive bowel sounds.  EXT: No edema, nontender.  SKIN: Warm.    RADIOLOGY:   < from: Xray Chest 1 View- PORTABLE-Urgent (09.19.24 @ 19:07) >  ACC: [id number] EXAM:  XR CHEST PORTABLE URGENT 1V   ORDERED BY: UTE UP   PROCEDURE DATE:  09/19/2024    IMPRESSION: No acute cardiopulmonary disease process.    CRITICAL CARE TIME SPENT:  35 minutes of critical care time spent providing medical care for patient's acute illness/conditions that impairs at least one vital organ system and/or poses a high risk of imminent or life threatening deterioration in the patient's condition. It includes time spent evaluating and treating the patient's acute illness as well as time spent reviewing labs, radiology, discussing goals of care with patient and/or patient's family, and discussing the case with a multidisciplinary team, in an effort to prevent further life threatening deterioration or end organ damage. This time is independent of any procedures performed.    Date of entry of this note is equal to the date of services rendered.

## 2024-09-22 LAB
ALBUMIN SERPL ELPH-MCNC: 2.9 G/DL — LOW (ref 3.3–5)
ALP SERPL-CCNC: 82 U/L — SIGNIFICANT CHANGE UP (ref 40–120)
ALT FLD-CCNC: 28 U/L — SIGNIFICANT CHANGE UP (ref 12–78)
ANION GAP SERPL CALC-SCNC: 9 MMOL/L — SIGNIFICANT CHANGE UP (ref 5–17)
AST SERPL-CCNC: 20 U/L — SIGNIFICANT CHANGE UP (ref 15–37)
BASOPHILS # BLD AUTO: 0.06 K/UL — SIGNIFICANT CHANGE UP (ref 0–0.2)
BASOPHILS NFR BLD AUTO: 0.7 % — SIGNIFICANT CHANGE UP (ref 0–2)
BILIRUB SERPL-MCNC: 0.5 MG/DL — SIGNIFICANT CHANGE UP (ref 0.2–1.2)
BUN SERPL-MCNC: 18 MG/DL — SIGNIFICANT CHANGE UP (ref 7–23)
CALCIUM SERPL-MCNC: 7.7 MG/DL — LOW (ref 8.5–10.1)
CHLORIDE SERPL-SCNC: 110 MMOL/L — HIGH (ref 96–108)
CO2 SERPL-SCNC: 22 MMOL/L — SIGNIFICANT CHANGE UP (ref 22–31)
CREAT SERPL-MCNC: 1.1 MG/DL — SIGNIFICANT CHANGE UP (ref 0.5–1.3)
EGFR: 52 ML/MIN/1.73M2 — LOW
EOSINOPHIL # BLD AUTO: 0.26 K/UL — SIGNIFICANT CHANGE UP (ref 0–0.5)
EOSINOPHIL NFR BLD AUTO: 3.1 % — SIGNIFICANT CHANGE UP (ref 0–6)
GLUCOSE SERPL-MCNC: 132 MG/DL — HIGH (ref 70–99)
HCT VFR BLD CALC: 27.8 % — LOW (ref 34.5–45)
HGB BLD-MCNC: 8.5 G/DL — LOW (ref 11.5–15.5)
IMM GRANULOCYTES NFR BLD AUTO: 0.2 % — SIGNIFICANT CHANGE UP (ref 0–0.9)
LYMPHOCYTES # BLD AUTO: 1.63 K/UL — SIGNIFICANT CHANGE UP (ref 1–3.3)
LYMPHOCYTES # BLD AUTO: 19.7 % — SIGNIFICANT CHANGE UP (ref 13–44)
MAGNESIUM SERPL-MCNC: 1.7 MG/DL — SIGNIFICANT CHANGE UP (ref 1.6–2.6)
MCHC RBC-ENTMCNC: 27 PG — SIGNIFICANT CHANGE UP (ref 27–34)
MCHC RBC-ENTMCNC: 30.6 GM/DL — LOW (ref 32–36)
MCV RBC AUTO: 88.3 FL — SIGNIFICANT CHANGE UP (ref 80–100)
MONOCYTES # BLD AUTO: 0.97 K/UL — HIGH (ref 0–0.9)
MONOCYTES NFR BLD AUTO: 11.7 % — SIGNIFICANT CHANGE UP (ref 2–14)
NEUTROPHILS # BLD AUTO: 5.33 K/UL — SIGNIFICANT CHANGE UP (ref 1.8–7.4)
NEUTROPHILS NFR BLD AUTO: 64.6 % — SIGNIFICANT CHANGE UP (ref 43–77)
NRBC # BLD: 0 /100 WBCS — SIGNIFICANT CHANGE UP (ref 0–0)
PHOSPHATE SERPL-MCNC: 2.2 MG/DL — LOW (ref 2.5–4.5)
PLATELET # BLD AUTO: 181 K/UL — SIGNIFICANT CHANGE UP (ref 150–400)
POTASSIUM SERPL-MCNC: 3.9 MMOL/L — SIGNIFICANT CHANGE UP (ref 3.5–5.3)
POTASSIUM SERPL-SCNC: 3.9 MMOL/L — SIGNIFICANT CHANGE UP (ref 3.5–5.3)
PROT SERPL-MCNC: 5.8 G/DL — LOW (ref 6–8.3)
RBC # BLD: 3.15 M/UL — LOW (ref 3.8–5.2)
RBC # FLD: 16.1 % — HIGH (ref 10.3–14.5)
SODIUM SERPL-SCNC: 141 MMOL/L — SIGNIFICANT CHANGE UP (ref 135–145)
WBC # BLD: 8.27 K/UL — SIGNIFICANT CHANGE UP (ref 3.8–10.5)
WBC # FLD AUTO: 8.27 K/UL — SIGNIFICANT CHANGE UP (ref 3.8–10.5)

## 2024-09-22 PROCEDURE — 76775 US EXAM ABDO BACK WALL LIM: CPT | Mod: 26

## 2024-09-22 PROCEDURE — 99291 CRITICAL CARE FIRST HOUR: CPT

## 2024-09-22 PROCEDURE — 99232 SBSQ HOSP IP/OBS MODERATE 35: CPT

## 2024-09-22 RX ORDER — POTASSIUM PHOSPHATE, MONOBASIC POTASSIUM PHOSPHATE, DIBASIC 224; 236 MG/ML; MG/ML
30 INJECTION, SOLUTION, CONCENTRATE INTRAVENOUS ONCE
Refills: 0 | Status: DISCONTINUED | OUTPATIENT
Start: 2024-09-22 | End: 2024-09-22

## 2024-09-22 RX ORDER — MAGNESIUM SULFATE 500 MG/ML
2 VIAL (ML) INJECTION ONCE
Refills: 0 | Status: COMPLETED | OUTPATIENT
Start: 2024-09-22 | End: 2024-09-22

## 2024-09-22 RX ORDER — CEFTRIAXONE SODIUM 1 G
1000 VIAL (EA) INJECTION EVERY 24 HOURS
Refills: 0 | Status: DISCONTINUED | OUTPATIENT
Start: 2024-09-22 | End: 2024-09-24

## 2024-09-22 RX ORDER — SOD PHOS DI, MONO/K PHOS MONO 250 MG
1 TABLET ORAL ONCE
Refills: 0 | Status: COMPLETED | OUTPATIENT
Start: 2024-09-22 | End: 2024-09-22

## 2024-09-22 RX ADMIN — PREGABALIN 75 MILLIGRAM(S): 25 CAPSULE ORAL at 17:35

## 2024-09-22 RX ADMIN — Medication 1 TABLET(S): at 12:20

## 2024-09-22 RX ADMIN — Medication 1: at 17:35

## 2024-09-22 RX ADMIN — Medication 100 MILLIGRAM(S): at 13:29

## 2024-09-22 RX ADMIN — Medication 1: at 21:11

## 2024-09-22 RX ADMIN — Medication 1 PACKET(S): at 12:20

## 2024-09-22 RX ADMIN — Medication 25 GRAM(S): at 12:20

## 2024-09-22 RX ADMIN — Medication 5000 UNIT(S): at 06:02

## 2024-09-22 RX ADMIN — INSULIN GLARGINE 12 UNIT(S): 300 INJECTION, SOLUTION SUBCUTANEOUS at 08:11

## 2024-09-22 RX ADMIN — PIPERACILLIN SODIUM AND TAZOBACTAM SODIUM 25 GRAM(S): 12; 1.5 INJECTION, POWDER, LYOPHILIZED, FOR SOLUTION INTRAVENOUS at 06:02

## 2024-09-22 RX ADMIN — MIDODRINE HYDROCHLORIDE 10 MILLIGRAM(S): 5 TABLET ORAL at 02:04

## 2024-09-22 RX ADMIN — PREGABALIN 75 MILLIGRAM(S): 25 CAPSULE ORAL at 06:02

## 2024-09-22 RX ADMIN — Medication 5000 UNIT(S): at 17:35

## 2024-09-22 RX ADMIN — ATORVASTATIN CALCIUM 40 MILLIGRAM(S): 10 TABLET, FILM COATED ORAL at 21:11

## 2024-09-22 RX ADMIN — Medication 81 MILLIGRAM(S): at 12:20

## 2024-09-22 NOTE — DIETITIAN INITIAL EVALUATION ADULT - PROBLEM SELECTOR PLAN 9
on heparin 5000 q12 BID for dvt prophylaxis  Diet: Consistent Carb w/ Evening Snack  PT evaluation  Wound care for skin abrasions on L ankle.

## 2024-09-22 NOTE — PROGRESS NOTE ADULT - SUBJECTIVE AND OBJECTIVE BOX
Patient is a 78y old  Female who presents with a chief complaint of Urinary tract infection     (22 Sep 2024 10:40)      INTERVAL HPI/OVERNIGHT EVENTS:. Patient seen and examined at bedside. No overnight events. No complaints at this time. Denies CP, SOB, abdominal pain.     MEDICATIONS  (STANDING):  aspirin enteric coated 81 milliGRAM(s) Oral daily  atorvastatin 40 milliGRAM(s) Oral at bedtime  cefTRIAXone   IVPB 1000 milliGRAM(s) IV Intermittent every 24 hours  dextrose 5%. 1000 milliLiter(s) (50 mL/Hr) IV Continuous <Continuous>  dextrose 5%. 1000 milliLiter(s) (100 mL/Hr) IV Continuous <Continuous>  dextrose 50% Injectable 25 Gram(s) IV Push once  dextrose 50% Injectable 25 Gram(s) IV Push once  dextrose 50% Injectable 12.5 Gram(s) IV Push once  glucagon  Injectable 1 milliGRAM(s) IntraMuscular once  heparin   Injectable 5000 Unit(s) SubCutaneous every 12 hours  insulin glargine Injectable (LANTUS) 12 Unit(s) SubCutaneous every morning  insulin lispro (ADMELOG) corrective regimen sliding scale   SubCutaneous at bedtime  insulin lispro (ADMELOG) corrective regimen sliding scale   SubCutaneous three times a day before meals  multivitamin/minerals 1 Tablet(s) Oral daily  pregabalin 75 milliGRAM(s) Oral every 12 hours    MEDICATIONS  (PRN):  acetaminophen     Tablet .. 650 milliGRAM(s) Oral every 6 hours PRN Temp greater or equal to 38C (100.4F), Mild Pain (1 - 3)  dextrose Oral Gel 15 Gram(s) Oral once PRN Blood Glucose LESS THAN 70 milliGRAM(s)/deciliter  melatonin 3 milliGRAM(s) Oral at bedtime PRN Insomnia      Allergies    No Known Allergies    Intolerances        REVIEW OF SYSTEMS:  All other review of systems is negative unless indicated above      Vital Signs Last 24 Hrs  T(C): 36.7 (22 Sep 2024 11:51), Max: 37.1 (21 Sep 2024 20:13)  T(F): 98 (22 Sep 2024 11:51), Max: 98.8 (21 Sep 2024 20:13)  HR: 60 (22 Sep 2024 09:00) (49 - 74)  BP: 101/80 (22 Sep 2024 09:00) (101/80 - 159/77)  BP(mean): 85 (22 Sep 2024 09:00) (82 - 106)  RR: 27 (22 Sep 2024 09:00) (22 - 33)  SpO2: 94% (22 Sep 2024 09:00) (92% - 96%)    Parameters below as of 22 Sep 2024 07:00  Patient On (Oxygen Delivery Method): room air        PHYSICAL EXAM:  GENERAL: NAD  HEENT:  anicteric, moist mucous membranes  CHEST/LUNG:  CTA b/l, no rales, wheezes, or rhonchi  HEART:  RRR, S1, S2  ABDOMEN:  BS+, soft, nontender, nondistended  EXTREMITIES: no edema, cyanosis, or calf tenderness  NERVOUS SYSTEM: awake, alert    LABS:                        8.5    8.27  )-----------( 181      ( 22 Sep 2024 05:38 )             27.8     CBC Full  -  ( 22 Sep 2024 05:38 )  WBC Count : 8.27 K/uL  Hemoglobin : 8.5 g/dL  Hematocrit : 27.8 %  Platelet Count - Automated : 181 K/uL  Mean Cell Volume : 88.3 fl  Mean Cell Hemoglobin : 27.0 pg  Mean Cell Hemoglobin Concentration : 30.6 gm/dL  Auto Neutrophil # : 5.33 K/uL  Auto Lymphocyte # : 1.63 K/uL  Auto Monocyte # : 0.97 K/uL  Auto Eosinophil # : 0.26 K/uL  Auto Basophil # : 0.06 K/uL  Auto Neutrophil % : 64.6 %  Auto Lymphocyte % : 19.7 %  Auto Monocyte % : 11.7 %  Auto Eosinophil % : 3.1 %  Auto Basophil % : 0.7 %    22 Sep 2024 05:38    141    |  110    |  18     ----------------------------<  132    3.9     |  22     |  1.10     Ca    7.7        22 Sep 2024 05:38  Phos  2.2       22 Sep 2024 05:38  Mg     1.7       22 Sep 2024 05:38    TPro  5.8    /  Alb  2.9    /  TBili  0.5    /  DBili  x      /  AST  20     /  ALT  28     /  AlkPhos  82     22 Sep 2024 05:38      Urinalysis Basic - ( 22 Sep 2024 05:38 )    Color: x / Appearance: x / SG: x / pH: x  Gluc: 132 mg/dL / Ketone: x  / Bili: x / Urobili: x   Blood: x / Protein: x / Nitrite: x   Leuk Esterase: x / RBC: x / WBC x   Sq Epi: x / Non Sq Epi: x / Bacteria: x      CAPILLARY BLOOD GLUCOSE      POCT Blood Glucose.: 149 mg/dL (22 Sep 2024 12:18)  POCT Blood Glucose.: 142 mg/dL (22 Sep 2024 08:09)  POCT Blood Glucose.: 185 mg/dL (21 Sep 2024 21:18)  POCT Blood Glucose.: 150 mg/dL (21 Sep 2024 17:33)  POCT Blood Glucose.: 167 mg/dL (21 Sep 2024 12:30)        Urinalysis with Rflx Culture (collected 09-19-24 @ 19:04)    Culture - Urine (collected 09-19-24 @ 19:04)  Source: Clean Catch  Preliminary Report (09-21-24 @ 09:44):    >100,000 CFU/ml Escherichia coli    Culture - Blood (collected 09-19-24 @ 18:45)  Source: .Blood Blood-Peripheral  Preliminary Report (09-22-24 @ 02:01):    No growth at 48 Hours    Culture - Blood (collected 09-19-24 @ 18:45)  Source: .Blood Blood-Peripheral  Preliminary Report (09-22-24 @ 02:01):    No growth at 48 Hours        RADIOLOGY & ADDITIONAL TESTS:    Personally reviewed.     Consultant(s) Notes Reviewed:  [x] YES  [ ] NO     no fever and no chills.

## 2024-09-22 NOTE — PROGRESS NOTE ADULT - ATTENDING COMMENTS
78F with PMHx of Type 2DM on insulin, HTN, HLD, psoriasis initially presenting with weakness and lethargy, admitted for septic shock secondary to UTI.    Neuro: awake and alert, AxOx3  CV: severe sepsis/septic shock with hypotension to systolics in 70s on floor, now hovering in low 90s   - bedside POCUS showing pulp IVC; hold off on further fluids  - given albumin x2 overnight  - c/w midodron 10mg q8 for now--> Bps improving ( MAP > 65)  PULM: no issues  GI: tolerating PO diet  RENAL: -ELDER likely ATN from sepsis, per family pt has "no kidney problems" but do not recall actual sCr  - continue to trend renal indices and strict I/Os  - bedside POCUS without gross evidence of hydronephrosis, however well circumcised 1cm cyst/mass noted on left kidney; follow up official US of kidney for evaluation  ID: septic shock secondary to UTI  - urine culture with E Coli- continue zosyn for now pending sensitivities  - dc vanco  ENDO: continue Lantus and FS premeal and qHS with ISS coverage; may need to hold Jardiance now given UTI (was on 10mg for "long time" and recently increased to 25mg which have precipitated worsening glycosuria and UTI)  HEME: DVT ppx with HSQ.
78F with PMHx of Type 2DM on insulin, HTN, HLD, psoriasis initially presenting with weakness and lethargy, admitted for septic shock secondary to UTI.    Neuro: awake and alert, AxOx3  CV: severe sepsis/septic shock with hypotension to systolics in 70s on floor, now improving  - bedside POCUS showing pulp IVC; hold off on further fluids  - will dc midodrine and monitor BPs   PULM: no issues  GI: tolerating PO diet  RENAL: -ELDER likely ATN from sepsis, per family pt has "no kidney problems" but do not recall actual sCr  - continue to trend renal indices and strict I/Os  - ELDER improving, replete lytes  - bedside POCUS without gross evidence of hydronephrosis, however well circumcised 1.5cm cyst/mass noted on left kidney; follow up official US of kidney for evaluation-- > official US showing solid 1.5 cm lesion in left upper pole; will need MR of the kidney  ID: septic shock secondary to UTI  - urine culture with E Coli- will de-escalate to ceftriaxone  ENDO: continue Lantus and FS premeal and qHS with ISS coverage; may need to hold Jardiance now given UTI (was on 10mg for "long time" and recently increased to 25mg which have precipitated worsening glycosuria and UTI)  HEME: DVT ppx with HSQ.     Patient hemodynamically improving. Out of bed to chair. Can downgrade to the floors today. Possible dispo foreign pending urine sensitivities.

## 2024-09-22 NOTE — PROGRESS NOTE ADULT - ASSESSMENT
CKD  UTI  Septic shock   DM    -CKD at baseline. 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 No indication for kidney biopsy  -S/p IVF bolus. S/p IVF NS  -Off Pressors per ICU. Midodrine. keep MAP>65  -ABX renal dose. Renal wise tolerating. No signs of AIN with zosyn   -Urine studies - pending   -Will benefit from restarting RAAS blockade upon dc  -Can continue Metformin with eGFR>30 upon dc  -May have to stop SGLT2i outpatient with urosepsis episode  -Replace Mg and phos     D/w ICU  D/w family

## 2024-09-22 NOTE — DIETITIAN INITIAL EVALUATION ADULT - ORAL INTAKE PTA/DIET HISTORY
Pt reports fair appetite/intake PTA. Typically consumes 2-3 meals/day and snacks in between meals. Pt consumes convenience/frozen foods that she can heat up in microwave. Regular diet at home. Does not consume soda or juice. Consumes water, tea, or flavored water (no added sugar). Adds splenda to tea.

## 2024-09-22 NOTE — DIETITIAN INITIAL EVALUATION ADULT - PROBLEM SELECTOR PLAN 1
Meets SIRS criteria with WBC count >12k, Temp of 102, and suspected source (UTI), and hypotension  Septic LEO collected-> blood cx x2, Urine analysis w/ reflex cx, CXR  RVP negative  Lactic acid wnl  Aggressive fluid resuscitation-> 3L bolused, and maintenance fluid started at  70cc/hr  on Jardiance for DM-> inc risk of UTI    plan  continue Ceftriaxone-> follow urine cx  tylenol for pain/fever  ID consult- Dr. Rodriguez

## 2024-09-22 NOTE — DIETITIAN INITIAL EVALUATION ADULT - PERTINENT MEDS FT
MEDICATIONS  (STANDING):  aspirin enteric coated 81 milliGRAM(s) Oral daily  atorvastatin 40 milliGRAM(s) Oral at bedtime  cefTRIAXone   IVPB 1000 milliGRAM(s) IV Intermittent every 24 hours  dextrose 5%. 1000 milliLiter(s) (50 mL/Hr) IV Continuous <Continuous>  dextrose 5%. 1000 milliLiter(s) (100 mL/Hr) IV Continuous <Continuous>  dextrose 50% Injectable 25 Gram(s) IV Push once  dextrose 50% Injectable 25 Gram(s) IV Push once  dextrose 50% Injectable 12.5 Gram(s) IV Push once  glucagon  Injectable 1 milliGRAM(s) IntraMuscular once  heparin   Injectable 5000 Unit(s) SubCutaneous every 12 hours  insulin glargine Injectable (LANTUS) 12 Unit(s) SubCutaneous every morning  insulin lispro (ADMELOG) corrective regimen sliding scale   SubCutaneous at bedtime  insulin lispro (ADMELOG) corrective regimen sliding scale   SubCutaneous three times a day before meals  magnesium sulfate  IVPB 2 Gram(s) IV Intermittent once  multivitamin/minerals 1 Tablet(s) Oral daily  potassium phosphate / sodium phosphate Powder (PHOS-NaK) 1 Packet(s) Oral once  pregabalin 75 milliGRAM(s) Oral every 12 hours    MEDICATIONS  (PRN):  acetaminophen     Tablet .. 650 milliGRAM(s) Oral every 6 hours PRN Temp greater or equal to 38C (100.4F), Mild Pain (1 - 3)  dextrose Oral Gel 15 Gram(s) Oral once PRN Blood Glucose LESS THAN 70 milliGRAM(s)/deciliter  melatonin 3 milliGRAM(s) Oral at bedtime PRN Insomnia

## 2024-09-22 NOTE — PROGRESS NOTE ADULT - SUBJECTIVE AND OBJECTIVE BOX
INTERVAL HPI/OVERNIGHT EVENTS: No acute overnight events occurred.    SUBJECTIVE: Seen and examined pt at bedside. Has no acute complaints at this time.    Review of Systems:  Constitutional: No fever, chills, fatigue  Neuro: No headache, numbness, weakness  Resp: No cough, wheezing, shortness of breath  CVS: No chest pain, palpitations, leg swelling  GI: No abdominal pain, nausea, vomiting, diarrhea   : No dysuria, frequency, incontinence  Skin: No itching, burning, rashes, or lesions   Msk: No joint pain or swelling  Psych: No depression, anxiety, mood swings    ICU Vital Signs Last 24 Hrs  T(C): 36.7 (22 Sep 2024 04:28), Max: 38.1 (21 Sep 2024 10:00)  T(F): 98 (22 Sep 2024 04:28), Max: 100.6 (21 Sep 2024 10:00)  HR: 49 (22 Sep 2024 07:00) (49 - 74)  BP: 129/62 (22 Sep 2024 07:00) (111/52 - 159/77)  BP(mean): 89 (22 Sep 2024 07:00) (75 - 106)  ABP: --  ABP(mean): --  RR: 22 (22 Sep 2024 07:00) (21 - 34)  SpO2: 93% (22 Sep 2024 07:00) (92% - 97%)    O2 Parameters below as of 22 Sep 2024 07:00  Patient On (Oxygen Delivery Method): room air              09-21-24 @ 07:01  -  09-22-24 @ 07:00  --------------------------------------------------------  IN: 1200 mL / OUT: 1000 mL / NET: 200 mL        CAPILLARY BLOOD GLUCOSE      POCT Blood Glucose.: 185 mg/dL (21 Sep 2024 21:18)      I&O's Summary    21 Sep 2024 07:01  -  22 Sep 2024 07:00  --------------------------------------------------------  IN: 1200 mL / OUT: 1000 mL / NET: 200 mL        PHYSICAL EXAM:  General: NAD  Neurology: awake and alert  HEENT: NC/AT  Respiratory: CTA b/l, no rales or rhonchi noted  Cardiovascular: RRR, normal S1S2, no M/R/G  Abdomen: soft, NT/ND, +BS, no palpable masses  Extremities: WWP, no clubbing, cyanosis, or edema  Skin: warm/dry      Meds:  piperacillin/tazobactam IVPB.. IV Intermittent    midodrine. Oral    atorvastatin Oral  dextrose 50% Injectable IV Push  dextrose 50% Injectable IV Push  dextrose 50% Injectable IV Push  dextrose Oral Gel Oral PRN  glucagon  Injectable IntraMuscular  insulin glargine Injectable (LANTUS) SubCutaneous  insulin lispro (ADMELOG) corrective regimen sliding scale SubCutaneous  insulin lispro (ADMELOG) corrective regimen sliding scale SubCutaneous      acetaminophen     Tablet .. Oral PRN  melatonin Oral PRN  pregabalin Oral      aspirin enteric coated Oral  heparin   Injectable SubCutaneous        dextrose 5%. IV Continuous  dextrose 5%. IV Continuous  multivitamin/minerals Oral                                  8.5    8.27  )-----------( 181      ( 22 Sep 2024 05:38 )             27.8       09-22    141  |  110[H]  |  18  ----------------------------<  132[H]  3.9   |  22  |  1.10    Ca    7.7[L]      22 Sep 2024 05:38  Phos  2.2     09-22  Mg     1.7     09-22    TPro  5.8[L]  /  Alb  2.9[L]  /  TBili  0.5  /  DBili  x   /  AST  20  /  ALT  28  /  AlkPhos  82  09-22            Urinalysis Basic - ( 22 Sep 2024 05:38 )    Color: x / Appearance: x / SG: x / pH: x  Gluc: 132 mg/dL / Ketone: x  / Bili: x / Urobili: x   Blood: x / Protein: x / Nitrite: x   Leuk Esterase: x / RBC: x / WBC x   Sq Epi: x / Non Sq Epi: x / Bacteria: x      Clean Catch   >100,000 CFU/ml Escherichia coli -- 09-19 @ 19:04  .Blood Blood-Peripheral   No growth at 48 Hours -- 09-19 @ 18:45      Rapid RVP Result: NotDetec (09-19 @ 18:45)          Radiology:    Bedside Ultrasound:    Tubes/Lines:      GLOBAL ISSUE/BEST PRACTICE:  Analgesia:  Sedation:  HOB elevation: Y  Stress ulcer prophylaxis:  VTE prophylaxis:  Glycemic control:  Nutrition:    CODE STATUS:          NOTE IN PROGRESS    INTERVAL HPI/OVERNIGHT EVENTS: No acute overnight events occurred.    SUBJECTIVE: Seen and examined pt at bedside. Has no acute complaints at this time.    Review of Systems:  Constitutional: No fever, chills, fatigue  Neuro: No headache, numbness, weakness  Resp: No cough, wheezing, shortness of breath  CVS: No chest pain, palpitations, leg swelling  GI: No abdominal pain, nausea, vomiting, diarrhea   : No dysuria, frequency, incontinence  Skin: No itching, burning, rashes, or lesions   Msk: No joint pain or swelling  Psych: No depression, anxiety, mood swings    ICU Vital Signs Last 24 Hrs  T(C): 36.7 (22 Sep 2024 04:28), Max: 38.1 (21 Sep 2024 10:00)  T(F): 98 (22 Sep 2024 04:28), Max: 100.6 (21 Sep 2024 10:00)  HR: 49 (22 Sep 2024 07:00) (49 - 74)  BP: 129/62 (22 Sep 2024 07:00) (111/52 - 159/77)  BP(mean): 89 (22 Sep 2024 07:00) (75 - 106)  ABP: --  ABP(mean): --  RR: 22 (22 Sep 2024 07:00) (21 - 34)  SpO2: 93% (22 Sep 2024 07:00) (92% - 97%)    O2 Parameters below as of 22 Sep 2024 07:00  Patient On (Oxygen Delivery Method): room air              09-21-24 @ 07:01  -  09-22-24 @ 07:00  --------------------------------------------------------  IN: 1200 mL / OUT: 1000 mL / NET: 200 mL        CAPILLARY BLOOD GLUCOSE      POCT Blood Glucose.: 185 mg/dL (21 Sep 2024 21:18)      I&O's Summary    21 Sep 2024 07:01  -  22 Sep 2024 07:00  --------------------------------------------------------  IN: 1200 mL / OUT: 1000 mL / NET: 200 mL        PHYSICAL EXAM:  General: NAD  Neurology: awake and alert  HEENT: NC/AT  Respiratory: CTA b/l, no rales or rhonchi noted  Cardiovascular: RRR, normal S1S2, no M/R/G  Abdomen: soft, NT/ND, +BS, no palpable masses  Extremities: WWP, no clubbing, cyanosis, or edema  Skin: warm/dry      Meds:  piperacillin/tazobactam IVPB.. IV Intermittent    midodrine. Oral    atorvastatin Oral  dextrose 50% Injectable IV Push  dextrose 50% Injectable IV Push  dextrose 50% Injectable IV Push  dextrose Oral Gel Oral PRN  glucagon  Injectable IntraMuscular  insulin glargine Injectable (LANTUS) SubCutaneous  insulin lispro (ADMELOG) corrective regimen sliding scale SubCutaneous  insulin lispro (ADMELOG) corrective regimen sliding scale SubCutaneous      acetaminophen     Tablet .. Oral PRN  melatonin Oral PRN  pregabalin Oral      aspirin enteric coated Oral  heparin   Injectable SubCutaneous        dextrose 5%. IV Continuous  dextrose 5%. IV Continuous  multivitamin/minerals Oral                                  8.5    8.27  )-----------( 181      ( 22 Sep 2024 05:38 )             27.8       09-22    141  |  110[H]  |  18  ----------------------------<  132[H]  3.9   |  22  |  1.10    Ca    7.7[L]      22 Sep 2024 05:38  Phos  2.2     09-22  Mg     1.7     09-22    TPro  5.8[L]  /  Alb  2.9[L]  /  TBili  0.5  /  DBili  x   /  AST  20  /  ALT  28  /  AlkPhos  82  09-22            Urinalysis Basic - ( 22 Sep 2024 05:38 )    Color: x / Appearance: x / SG: x / pH: x  Gluc: 132 mg/dL / Ketone: x  / Bili: x / Urobili: x   Blood: x / Protein: x / Nitrite: x   Leuk Esterase: x / RBC: x / WBC x   Sq Epi: x / Non Sq Epi: x / Bacteria: x      Clean Catch   >100,000 CFU/ml Escherichia coli -- 09-19 @ 19:04  .Blood Blood-Peripheral   No growth at 48 Hours -- 09-19 @ 18:45      Rapid RVP Result: NotDetec (09-19 @ 18:45)          Radiology:    Bedside Ultrasound:    Tubes/Lines:      GLOBAL ISSUE/BEST PRACTICE:  Analgesia:  Sedation:  HOB elevation: Y  Stress ulcer prophylaxis:  VTE prophylaxis:  Glycemic control:  Nutrition:    CODE STATUS:          INTERVAL HPI/OVERNIGHT EVENTS: No acute overnight events occurred.    SUBJECTIVE: Seen and examined pt at bedside. Has no acute complaints at this time, laying comfortable, would like to go home    Review of Systems:  Constitutional: No fever, chills, fatigue  Neuro: No headache, numbness, weakness  Resp: No cough, wheezing, shortness of breath  CVS: No chest pain, palpitations, leg swelling  GI: No abdominal pain, nausea, vomiting, diarrhea   : No dysuria, frequency, incontinence  Skin: No itching, burning, rashes, or lesions   Msk: No joint pain or swelling    ICU Vital Signs Last 24 Hrs  T(C): 36.7 (22 Sep 2024 04:28), Max: 38.1 (21 Sep 2024 10:00)  T(F): 98 (22 Sep 2024 04:28), Max: 100.6 (21 Sep 2024 10:00)  HR: 49 (22 Sep 2024 07:00) (49 - 74)  BP: 129/62 (22 Sep 2024 07:00) (111/52 - 159/77)  BP(mean): 89 (22 Sep 2024 07:00) (75 - 106)  RR: 22 (22 Sep 2024 07:00) (21 - 34)  SpO2: 93% (22 Sep 2024 07:00) (92% - 97%)    O2 Parameters below as of 22 Sep 2024 07:00  Patient On (Oxygen Delivery Method): room air              09-21-24 @ 07:01  -  09-22-24 @ 07:00  --------------------------------------------------------  IN: 1200 mL / OUT: 1000 mL / NET: 200 mL        CAPILLARY BLOOD GLUCOSE      POCT Blood Glucose.: 185 mg/dL (21 Sep 2024 21:18)      I&O's Summary    21 Sep 2024 07:01  -  22 Sep 2024 07:00  --------------------------------------------------------  IN: 1200 mL / OUT: 1000 mL / NET: 200 mL        PHYSICAL EXAM:  General: NAD  Neurology: awake and alert  HEENT: NC/AT  Respiratory: CTA b/l, no rales or rhonchi noted  Cardiovascular: RRR, normal S1S2, no M/R/G  Abdomen: soft, NT/ND, +BS, no palpable masses  Extremities: WWP, no clubbing, cyanosis, or edema  Skin: warm/dry      Meds:  piperacillin/tazobactam IVPB.. IV Intermittent    midodrine. Oral    atorvastatin Oral  dextrose 50% Injectable IV Push  dextrose 50% Injectable IV Push  dextrose 50% Injectable IV Push  dextrose Oral Gel Oral PRN  glucagon  Injectable IntraMuscular  insulin glargine Injectable (LANTUS) SubCutaneous  insulin lispro (ADMELOG) corrective regimen sliding scale SubCutaneous  insulin lispro (ADMELOG) corrective regimen sliding scale SubCutaneous      acetaminophen     Tablet .. Oral PRN  melatonin Oral PRN  pregabalin Oral      aspirin enteric coated Oral  heparin   Injectable SubCutaneous        dextrose 5%. IV Continuous  dextrose 5%. IV Continuous  multivitamin/minerals Oral                                  8.5    8.27  )-----------( 181      ( 22 Sep 2024 05:38 )             27.8       09-22    141  |  110[H]  |  18  ----------------------------<  132[H]  3.9   |  22  |  1.10    Ca    7.7[L]      22 Sep 2024 05:38  Phos  2.2     09-22  Mg     1.7     09-22    TPro  5.8[L]  /  Alb  2.9[L]  /  TBili  0.5  /  DBili  x   /  AST  20  /  ALT  28  /  AlkPhos  82  09-22            Urinalysis Basic - ( 22 Sep 2024 05:38 )    Color: x / Appearance: x / SG: x / pH: x  Gluc: 132 mg/dL / Ketone: x  / Bili: x / Urobili: x   Blood: x / Protein: x / Nitrite: x   Leuk Esterase: x / RBC: x / WBC x   Sq Epi: x / Non Sq Epi: x / Bacteria: x      Clean Catch   >100,000 CFU/ml Escherichia coli -- 09-19 @ 19:04  .Blood Blood-Peripheral   No growth at 48 Hours -- 09-19 @ 18:45      Rapid RVP Result: NotDetec (09-19 @ 18:45)          Radiology:    Bedside Ultrasound:    GLOBAL ISSUE/BEST PRACTICE:  Analgesia:  Y  Sedation: N  HOB elevation: Y  Stress ulcer prophylaxis: N  VTE prophylaxis:  hep  Glycemic control: Y  Nutrition: Y    CODE STATUS:  Full code

## 2024-09-22 NOTE — DIETITIAN INITIAL EVALUATION ADULT - PROBLEM SELECTOR PLAN 4
on lisinopril 20, Metoprolol Succ 50 mg, Triamterene unknown dose?    will hold antihypertensives given sepsis (hypotensive) picture

## 2024-09-22 NOTE — PROGRESS NOTE ADULT - SUBJECTIVE AND OBJECTIVE BOX
HORACE PERKINS is a 78yFemale , patient examined and chart reviewed.     INTERVAL HPI/ OVERNIGHT EVENTS:  Feeling better. Afebrile.  Hypotension resolved - did not require pressors.  Family at bedside. Doing well.    Past Medical History--  PAST MEDICAL & SURGICAL HISTORY:  DM (diabetes mellitus)  Benign essential HTN  H/O Bell's palsy  Gout  HLD (hyperlipidemia)  Psoriasis    For details regarding the patient's social history, family history, and other miscellaneous elements, please refer the initial infectious diseases consultation and/or the admitting history and physical examination for this admission.    ROS:  CONSTITUTIONAL:  Negative fever or chills  EYES:  Negative  blurry vision or double vision  CARDIOVASCULAR:  Negative for chest pain or palpitations  RESPIRATORY:  Negative for cough, wheezing, or SOB   GASTROINTESTINAL:  Negative for nausea, vomiting, diarrhea, constipation, or abdominal pain  GENITOURINARY:  Negative frequency, urgency , dysuria or hematuria   NEUROLOGIC:  No headache, confusion, dizziness, lightheadedness  All other systems were reviewed and are negative     No Known Allergies      Current inpatient medications :    ANTIBIOTICS/RELEVANT:  cefTRIAXone   IVPB 1000 milliGRAM(s) IV Intermittent every 24 hours    MEDICATIONS  (STANDING):  aspirin enteric coated 81 milliGRAM(s) Oral daily  atorvastatin 40 milliGRAM(s) Oral at bedtime  dextrose 5%. 1000 milliLiter(s) (50 mL/Hr) IV Continuous <Continuous>  dextrose 5%. 1000 milliLiter(s) (100 mL/Hr) IV Continuous <Continuous>  dextrose 50% Injectable 25 Gram(s) IV Push once  dextrose 50% Injectable 25 Gram(s) IV Push once  dextrose 50% Injectable 12.5 Gram(s) IV Push once  glucagon  Injectable 1 milliGRAM(s) IntraMuscular once  heparin   Injectable 5000 Unit(s) SubCutaneous every 12 hours  insulin glargine Injectable (LANTUS) 12 Unit(s) SubCutaneous every morning  insulin lispro (ADMELOG) corrective regimen sliding scale   SubCutaneous at bedtime  insulin lispro (ADMELOG) corrective regimen sliding scale   SubCutaneous three times a day before meals  multivitamin/minerals 1 Tablet(s) Oral daily  pregabalin 75 milliGRAM(s) Oral every 12 hours    MEDICATIONS  (PRN):  acetaminophen     Tablet .. 650 milliGRAM(s) Oral every 6 hours PRN Temp greater or equal to 38C (100.4F), Mild Pain (1 - 3)  dextrose Oral Gel 15 Gram(s) Oral once PRN Blood Glucose LESS THAN 70 milliGRAM(s)/deciliter  melatonin 3 milliGRAM(s) Oral at bedtime PRN Insomnia      Objective:  Vital Signs Last 24 Hrs  T(C): 36.7 (22 Sep 2024 16:08), Max: 37.1 (21 Sep 2024 20:13)  T(F): 98.1 (22 Sep 2024 16:08), Max: 98.8 (21 Sep 2024 20:13)  HR: 63 (22 Sep 2024 17:00) (49 - 74)  BP: 120/57 (22 Sep 2024 17:00) (101/80 - 152/72)  BP(mean): 82 (22 Sep 2024 17:00) (79 - 103)  RR: 22 (22 Sep 2024 17:00) (18 - 31)  SpO2: 95% (22 Sep 2024 17:00) (92% - 96%)    Parameters below as of 22 Sep 2024 07:00  Patient On (Oxygen Delivery Method): room air      Physical Exam:  GEN: NAD, pleasant  HEENT: normocephalic and atraumatic.   NECK: Supple.   LUNGS: Clear to auscultation.  HEART: Regular rate and rhythm without murmur.  ABDOMEN: Soft, nontender, and nondistended.  Positive bowel sounds.    EXTREMITIES: Without any cyanosis, clubbing, rash, lesions or edema.  NEUROLOGIC: A & O x3, No focal neurological deficits   SKIN: No rash    LABS:                        8.5    8.27  )-----------( 181      ( 22 Sep 2024 05:38 )             27.8   09-22    141  |  110[H]  |  18  ----------------------------<  132[H]  3.9   |  22  |  1.10    Ca    7.7[L]      22 Sep 2024 05:38  Phos  2.2     09-22  Mg     1.7     09-22    TPro  5.8[L]  /  Alb  2.9[L]  /  TBili  0.5  /  DBili  x   /  AST  20  /  ALT  28  /  AlkPhos  82  09-22      MICROBIOLOGY:  Culture - Urine (09.19.24 @ 19:04)    Specimen Source: Clean Catch   Culture Results:   >100,000 CFU/ml Escherichia coli    Culture - Blood (collected 19 Sep 2024 18:45)  Source: .Blood Blood-Peripheral  Preliminary Report (21 Sep 2024 02:01):    No growth at 24 hours    Culture - Blood (collected 19 Sep 2024 18:45)  Source: .Blood Blood-Peripheral  Preliminary Report (21 Sep 2024 02:01):    No growth at 24 hours    RADIOLOGY & ADDITIONAL STUDIES:    ACC: [id number] EXAM:  US KIDNEY(S)   ORDERED BY: PATRICA FUNEZ     PROCEDURE DATE:  09/22/2024          INTERPRETATION:  CLINICAL INFORMATION: UTI.    COMPARISON: None available.    TECHNIQUE: Sonography of the kidneys and bladder.    FINDINGS:  Right kidney: 10.1 cm. No solid renal mass, hydronephrosis or calculi.   There is a 4.0 cm cyst.    Left kidney: 10.8 cm.. No hydronephrosis or calculi. There is a 1.6 cm   solid echogenic upper pole lesion.    Urinary bladder: Well-distended and unremarkable.    Incidental note is made of a distended gallbladder containing stones.   Suggestion of gallbladder wall calcification. Gallbladder wall thickness   is within normal limits.    IMPRESSION:  Right renal cyst.    1.6 cm solid left renal lesion may represent an angiomyolipoma but other   renal lesions may have this appearance. Recommend further evaluation with   dedicated pre/postcontrast renal CT scan or MRI for further evaluation.    Gallstones and possible gallbladder wall calcification.      Assessment :  78-year-old  female with history of insulin-dependent diabetes mellitus hypertension dyslipidemia who presented with generalized weakness fever confusion being admitted for sepsis with shock secd complicated UTI  Acute metabolic encephalopathy resolved  ELDER improving  SP fever  WBC normalised  Septic shock resolved  Blood cultures NGTD  Renal u/s noted- no hydor  Clinically improved    Plan:   Cont Rocephin  Fu cultures  Trend temps and cbc  Pulm toileting  Stable from ID standpoint    D/w Dr Huff to resume care tmrw    Continue with present regiment.  Appropriate use of antibiotics and adverse effects reviewed.    I have discussed the above plan of care with patient/ family in detail. They expressed understanding of the the treatment plan . Risks, benefits and alternatives discussed in detail. I have asked if they have any questions or concerns and appropriately addressed them to the best of my ability .      Critical care time greater then 35 minutes reviewing notes, labs data/ imaging , discussion with multidisciplinary team.    Thank you for allowing me to participate in care of your patient .        Siobhan Neil MD  Infectious Disease  [phone]

## 2024-09-22 NOTE — DIETITIAN INITIAL EVALUATION ADULT - ADD RECOMMEND
1) Continue current diet as tolerated; honor food preferences as able to optimize po intake/tolerance  2) Recommend MVI/minerals daily  3) Monitor po intake, diet tolerance, weight trends, labs, GI function, skin integrity 1) Continue current diet as tolerated; assistance/encouragement at meal times as needed  2) Recommend MVI/minerals daily  3) Monitor po intake, diet tolerance, weight trends, labs, GI function, skin integrity

## 2024-09-22 NOTE — PROGRESS NOTE ADULT - ASSESSMENT
78F PMHx T2DM, HTN, HLD, psoriasis admitted to MICU with:  Assessment:  1. Septic Shock  2. UTI  3. ELDER  4. Lactic Acidosis     Plan:  NEURO:   -Monitor mental status closely, avoid neurosuppresants.  -Serial neurologic assessments.     CV:   - pressures improved  - s/p albumin x 2 ( 9/21)  - Midodrine 10mg q8h.   -Keep K~4 and Mg>2 for optimal arrhythmia suppression.  -ASA/Lipitor QD.  -Official TTE ordered for AM.     PULM:   - Saturating well on RA      GI:   -CC Diet.    RENAL:   - Renal function currently stable at 1.4   - likely pre-renal from hypovolemia vs. ATN iso shock state.   - f/u US of kidneys  -I's and O's, goal UOP 0.5 cc/kg/hr  -renal dose meds and avoid nephrotoxins     ENDO:   -Aggressive glycemic control to limit FS glucose to <180mg/dl; ISS,   - re evaluate use of SGLT-2 inhibtors with outpatient endo provider  - Lantus 12u     ID:   -UA positive.   - Cx e.coli    - c/w zosyn ( 9/20 started) d/c vanc      HEME:   -VTE ppx with [facility].     GOC: Full Code.    78F PMHx T2DM, HTN, HLD, psoriasis admitted to MICU with:  Assessment:  1. Septic Shock  2. UTI  3. ELDER  4. Lactic Acidosis     Plan:  NEURO:   -Monitor mental status closely, avoid neurosuppresants, cont pregabalin  -Serial neurologic assessments.     CV:   - pressures improved  - s/p albumin x 2 ( 9/21)  - Midodrine discontinued  - Keep K~4 and Mg>2 for optimal arrhythmia suppression.  - ASA/Lipitor QD.  - TTE 9/21/24 EF 60%    PULM:   - Saturating well on RA      GI:   -CC Diet.    RENAL:   - Renal function currently stable at 1.1  - likely pre-renal from hypovolemia vs. ATN iso shock state.   - f/u US of kidneys  -I's and O's, goal UOP 0.5 cc/kg/hr  -renal dose meds and avoid nephrotoxins     ENDO:   -Aggressive glycemic control to limit FS glucose to <180mg/dl; ISS,   - re evaluate use of SGLT-2 inhibtors with outpatient endo provider  - Lantus 12u     ID:   -UA positive.   - Cx e.coli    - added ceftriaxone 1000mg, d/c zosyn      HEME:   -VTE ppx with [facility].     GOC: Full Code.

## 2024-09-22 NOTE — DIETITIAN INITIAL EVALUATION ADULT - PERTINENT LABORATORY DATA
09-22    141  |  110[H]  |  18  ----------------------------<  132[H]  3.9   |  22  |  1.10    Ca    7.7[L]      22 Sep 2024 05:38  Phos  2.2     09-22  Mg     1.7     09-22    TPro  5.8[L]  /  Alb  2.9[L]  /  TBili  0.5  /  DBili  x   /  AST  20  /  ALT  28  /  AlkPhos  82  09-22  POCT Blood Glucose.: 142 mg/dL (09-22-24 @ 08:09)  A1C with Estimated Average Glucose Result: 7.5 % (09-20-24 @ 09:00)

## 2024-09-22 NOTE — PROGRESS NOTE ADULT - SUBJECTIVE AND OBJECTIVE BOX
[facility]                             Nephrology and Hypertension                             Christopher Cooper                                          [phone]     Patient is a 78y old  Female who presents with a chief complaint of increased fatigue,  high temperature (21 Sep 2024 00:17)       HPI:  78 F hx IDDM, HTN, HLD, presents due to generalized weakness, fever. Pt was feeling weak/tired, fell asleep in her car, in the heat for over 2 hours. Throughout day, she was unable to do her daily tasks secondary to the fatigue. 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 This level of fatigue worried family and prompted them to bring her in to ED. In ED, she was found to be febrile to 102. She states she sometimes feels fatigue after taking her home medications which include many diabetic drugs; however, she has not had any changes in medication in recent time besides a monthly injection for her psoriasis.    In ED was given one dose of ceftriaxone for a positive UA (nitrites, Too numerous bacteria to count though squamous epithelial cells were present and WBC of 25). RVP was negative. Elevated WBC count of 13.33 with Neutrophil % of 82.2. Septic LEO was done (19 Sep 2024 22:55)    Renal consulted for CKD and UTI. 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 Family at bedside        PAST MEDICAL & SURGICAL HISTORY:  DM (diabetes mellitus)      Benign essential HTN      H/O Bell's palsy      Gout      HLD (hyperlipidemia)      Psoriasis      No significant past surgical history           FAMILY HISTORY:  NC    Social History:Non smoker    MEDICATIONS  (STANDING):  aspirin enteric coated 81 milliGRAM(s) Oral daily  atorvastatin 40 milliGRAM(s) Oral at bedtime  dextrose 5%. 1000 milliLiter(s) (50 mL/Hr) IV Continuous <Continuous>  dextrose 5%. 1000 milliLiter(s) (100 mL/Hr) IV Continuous <Continuous>  dextrose 50% Injectable 25 Gram(s) IV Push once  dextrose 50% Injectable 25 Gram(s) IV Push once  dextrose 50% Injectable 12.5 Gram(s) IV Push once  glucagon  Injectable 1 milliGRAM(s) IntraMuscular once  heparin   Injectable 5000 Unit(s) SubCutaneous every 12 hours  insulin glargine Injectable (LANTUS) 12 Unit(s) SubCutaneous every morning  insulin lispro (ADMELOG) corrective regimen sliding scale   SubCutaneous at bedtime  insulin lispro (ADMELOG) corrective regimen sliding scale   SubCutaneous three times a day before meals  magnesium sulfate  IVPB 2 Gram(s) IV Intermittent once  midodrine. 10 milliGRAM(s) Oral three times a day  multivitamin/minerals 1 Tablet(s) Oral daily  norepinephrine Infusion 0.05 MICROgram(s)/kG/Min (8.25 mL/Hr) IV Continuous <Continuous>  piperacillin/tazobactam IVPB.. 3.375 Gram(s) IV Intermittent every 8 hours  potassium chloride    Tablet ER 40 milliEquivalent(s) Oral once  pregabalin 75 milliGRAM(s) Oral every 12 hours  sodium chloride 0.9%. 1000 milliLiter(s) (100 mL/Hr) IV Continuous <Continuous>  vancomycin  IVPB 1250 milliGRAM(s) IV Intermittent every 24 hours    MEDICATIONS  (PRN):  acetaminophen     Tablet .. 650 milliGRAM(s) Oral every 6 hours PRN Temp greater or equal to 38C (100.4F), Mild Pain (1 - 3)  dextrose Oral Gel 15 Gram(s) Oral once PRN Blood Glucose LESS THAN 70 milliGRAM(s)/deciliter  melatonin 3 milliGRAM(s) Oral at bedtime PRN Insomnia   Meds reviewed    Allergies    No Known Allergies    Intolerances         REVIEW OF SYSTEMS: As per HPI, otherwise negative    ICU Vital Signs Last 24 Hrs  T(C): 36.9 (22 Sep 2024 07:45), Max: 38.1 (21 Sep 2024 10:00)  T(F): 98.4 (22 Sep 2024 07:45), Max: 100.6 (21 Sep 2024 10:00)  HR: 49 (22 Sep 2024 07:00) (49 - 74)  BP: 129/62 (22 Sep 2024 07:00) (111/52 - 159/77)  BP(mean): 89 (22 Sep 2024 07:00) (75 - 106)  ABP: --  ABP(mean): --  RR: 22 (22 Sep 2024 07:00) (22 - 34)  SpO2: 93% (22 Sep 2024 07:00) (92% - 97%)    O2 Parameters below as of 22 Sep 2024 07:00  Patient On (Oxygen Delivery Method): room air            PHYSICAL EXAM:    GENERAL: NAD  HEAD:  Atraumatic, Normocephalic  EYES: EOMI, conjunctiva and sclera clear  ENMT: No Drainage from nares, No drainage from ears  NECK: Supple, neck  veins full  NERVOUS SYSTEM:  Awake and Alert  CHEST/LUNG: Clear to percussion bilaterally; No rales, rhonchi, wheezing, or rubs  HEART: Regular rate and rhythm; No murmurs, rubs, or gallops  ABDOMEN: Soft, Nontender, Nondistended; Bowel sounds present  EXTREMITIES:  No Edema  SKIN: No rashes No obvious ecchymosis      LABS:                        8.5    8.27  )-----------( 181      ( 22 Sep 2024 05:38 )             27.8     09-22    141  |  110[H]  |  18  ----------------------------<  132[H]  3.9   |  22  |  1.10    Ca    7.7[L]      22 Sep 2024 05:38  Phos  2.2     09-22  Mg     1.7     09-22    TPro  5.8[L]  /  Alb  2.9[L]  /  TBili  0.5  /  DBili  x   /  AST  20  /  ALT  28  /  AlkPhos  82  09-22      Urinalysis Basic - ( 22 Sep 2024 05:38 )    Color: x / Appearance: x / SG: x / pH: x  Gluc: 132 mg/dL / Ketone: x  / Bili: x / Urobili: x   Blood: x / Protein: x / Nitrite: x   Leuk Esterase: x / RBC: x / WBC x   Sq Epi: x / Non Sq Epi: x / Bacteria: x

## 2024-09-22 NOTE — DIETITIAN INITIAL EVALUATION ADULT - NUTRITION DIAGNOSIS
Progress note - Palliative and Supportive Care   Dorcus Severance 69 y o  male [phone]    Assessment:  Patient Active Problem List   Diagnosis    Esophageal cancer ([facility] )    History of diabetes mellitus    History of hypertension    Port-A-Cath in place    Moderate protein-calorie malnutrition ([facility] )    DM (diabetes mellitus) ([facility] )    JASWINDER (acute kidney injury) ([place] [id number] )    PNA (pneumonia)    Atrial fibrillation with RVR (HCC)    Acute respiratory failure with hypoxia (HCC)    Encephalopathy    Esophagectomy, anastomotic leak    Anemia    Mediastinal abscess (HCC)    Stage II pressure ulcer (HCC)    Depression    Severe protein-calorie malnutrition ([facility] )     Plan:  1  Symptom management -    - per primary team    2  Goals - level 2 DNAR    - Patient's goal is to continue medical optimization with the hope to return home and spend more time with his family  If patient reaches stability to achieve this goal, will discuss whether he intends to focus on comfort directed care or cancer directed care at that time  Unsure whether he would be willing to pursue another rehab stay  - Limit of no CPR  - The above goals have been discussed with the patient, his spouse, 2 daughters, primary team, and thoracics team      3  Social support   - Time spent providing supportive listening   - Palliative care will resume supportive visits on Monday, call sooner with questions or concerns  Code Status: DNAR - Level 2   Decisional apparatus:  Patient is competent on my exam today  If competence is lost, patient's substitute decision maker would default to spouse by [place]  Advance Directive / Living Will / POLST:  None on file    Interval history:       Patient reports doing "a little better today"  He slept better last evening, coughing has slightly improved  Bowel movements have decreased   He is very pleased to have been out of bed, working with PT     Jerel Watson / ALLERGIES:     all current active meds have been reviewed    No Known Allergies    OBJECTIVE:    Physical Exam  Physical Exam   Constitutional: He is oriented to person, place, and time  He appears well-developed  HENT:   Head: Normocephalic and atraumatic  Eyes: Conjunctivae are normal    Cardiovascular: Normal rate  Pulmonary/Chest:   HFNC, 51%   Abdominal: He exhibits no distension  There is no guarding  Musculoskeletal: He exhibits no edema  Neurological: He is alert and oriented to person, place, and time  Skin: Skin is warm and dry  Psychiatric:   Mood slightly improved       Lab Results:   I have personally reviewed pertinent labs  , CBC:   Lab Results   Component Value Date    WBC 10 06 07/16/2020    HGB 9 3 (L) 07/16/2020    HCT 31 2 (L) 07/16/2020    MCV 92 07/16/2020     07/16/2020    MCH 27 3 07/16/2020    MCHC 29 8 (L) 07/16/2020    RDW 17 2 (H) 07/16/2020    MPV 9 0 07/16/2020    NRBC 0 07/16/2020   , CMP:   Lab Results   Component Value Date    SODIUM 138 07/16/2020    K 3 9 07/16/2020     07/16/2020    CO2 31 07/16/2020    BUN 11 07/16/2020    CREATININE 0 53 (L) 07/16/2020    CALCIUM 8 5 07/16/2020    EGFR 103 07/16/2020     Imaging Studies: reviewed pertinent studies  EKG, Pathology, and Other Studies: reviewed pertinent studies    Counseling / Coordination of Care    Total floor / unit time spent today 25+ minutes  Greater than 50% of total time was spent with the patient and / or family counseling and / or coordination of care  A description of the counseling / coordination of care: time spent with patient discussing symptom management and providing support to patient and family at bedside  yes...

## 2024-09-22 NOTE — DIETITIAN INITIAL EVALUATION ADULT - OTHER INFO
Pt is a "77 yo F with PMHx of T2DM on insulin, HTN, HLD, psoriasis initially presenting with weakness and lethargy, admitted for septic shock secondary to UTI."    Visited pt at bedside this am. Daughter present at bedside.    CBW on admission 200#. 1+ generalized, BL foot edema noted. Skin: BL ankle blister, stage I to sacrum. Pt currently on consistent carbohydrate diet. A1c of 7.5% obtained. Freestyle lauren CGM at home.    Pt is a "77 yo F with PMHx of T2DM on insulin, HTN, HLD, psoriasis initially presenting with weakness and lethargy, admitted for septic shock secondary to UTI."    Visited pt at bedside this am. Daughter present at bedside. Pt reports fair appetite/intake. Ate well for breakfast meal this am. Tolerating diet well. No food allergies. Denies chewing/swallowing difficulties. Denies N/V/D/C. +BM 9/21. CBW on admission 200#. Pt reports UBW of 193#. Weight fluctuates 183-200#. 1+ generalized, BL foot edema noted. Skin: BL ankle blister, stage I to sacrum. Pt currently on consistent carbohydrate diet. A1c of 7.5% obtained. Freestyle lauren CGM at home. home toujeo 12 units daily, mounjarno 10mg weekly, glimepiride 2 mg daily, jardiance 25mg daily, metformin 1000mg BID. Provided verbal and written DM diet education during visit today. Pt receptive. Food preferences obtained to optimize po intake/tolerance. RD remains available and will continue to follow-up.

## 2024-09-23 LAB
ALBUMIN SERPL ELPH-MCNC: 2.8 G/DL — LOW (ref 3.3–5)
ALP SERPL-CCNC: 104 U/L — SIGNIFICANT CHANGE UP (ref 40–120)
ALT FLD-CCNC: 27 U/L — SIGNIFICANT CHANGE UP (ref 12–78)
ANION GAP SERPL CALC-SCNC: 5 MMOL/L — SIGNIFICANT CHANGE UP (ref 5–17)
AST SERPL-CCNC: 13 U/L — LOW (ref 15–37)
BILIRUB SERPL-MCNC: 0.3 MG/DL — SIGNIFICANT CHANGE UP (ref 0.2–1.2)
BUN SERPL-MCNC: 12 MG/DL — SIGNIFICANT CHANGE UP (ref 7–23)
CALCIUM SERPL-MCNC: 8.8 MG/DL — SIGNIFICANT CHANGE UP (ref 8.5–10.1)
CHLORIDE SERPL-SCNC: 110 MMOL/L — HIGH (ref 96–108)
CO2 SERPL-SCNC: 26 MMOL/L — SIGNIFICANT CHANGE UP (ref 22–31)
CREAT SERPL-MCNC: 0.88 MG/DL — SIGNIFICANT CHANGE UP (ref 0.5–1.3)
EGFR: 68 ML/MIN/1.73M2 — SIGNIFICANT CHANGE UP
GLUCOSE SERPL-MCNC: 164 MG/DL — HIGH (ref 70–99)
HCT VFR BLD CALC: 29.5 % — LOW (ref 34.5–45)
HGB BLD-MCNC: 9.1 G/DL — LOW (ref 11.5–15.5)
MAGNESIUM SERPL-MCNC: 1.9 MG/DL — SIGNIFICANT CHANGE UP (ref 1.6–2.6)
MCHC RBC-ENTMCNC: 27.4 PG — SIGNIFICANT CHANGE UP (ref 27–34)
MCHC RBC-ENTMCNC: 30.8 GM/DL — LOW (ref 32–36)
MCV RBC AUTO: 88.9 FL — SIGNIFICANT CHANGE UP (ref 80–100)
NRBC # BLD: 0 /100 WBCS — SIGNIFICANT CHANGE UP (ref 0–0)
PHOSPHATE SERPL-MCNC: 2.5 MG/DL — SIGNIFICANT CHANGE UP (ref 2.5–4.5)
PLATELET # BLD AUTO: 190 K/UL — SIGNIFICANT CHANGE UP (ref 150–400)
POTASSIUM SERPL-MCNC: 4 MMOL/L — SIGNIFICANT CHANGE UP (ref 3.5–5.3)
POTASSIUM SERPL-SCNC: 4 MMOL/L — SIGNIFICANT CHANGE UP (ref 3.5–5.3)
PROT SERPL-MCNC: 6.4 G/DL — SIGNIFICANT CHANGE UP (ref 6–8.3)
RBC # BLD: 3.32 M/UL — LOW (ref 3.8–5.2)
RBC # FLD: 15.9 % — HIGH (ref 10.3–14.5)
SODIUM SERPL-SCNC: 141 MMOL/L — SIGNIFICANT CHANGE UP (ref 135–145)
WBC # BLD: 5.51 K/UL — SIGNIFICANT CHANGE UP (ref 3.8–10.5)
WBC # FLD AUTO: 5.51 K/UL — SIGNIFICANT CHANGE UP (ref 3.8–10.5)

## 2024-09-23 PROCEDURE — 99233 SBSQ HOSP IP/OBS HIGH 50: CPT

## 2024-09-23 PROCEDURE — 74183 MRI ABD W/O CNTR FLWD CNTR: CPT | Mod: 26

## 2024-09-23 RX ADMIN — INSULIN GLARGINE 12 UNIT(S): 300 INJECTION, SOLUTION SUBCUTANEOUS at 08:07

## 2024-09-23 RX ADMIN — Medication 1: at 08:07

## 2024-09-23 RX ADMIN — Medication 100 MILLIGRAM(S): at 13:01

## 2024-09-23 RX ADMIN — PREGABALIN 75 MILLIGRAM(S): 25 CAPSULE ORAL at 18:04

## 2024-09-23 RX ADMIN — Medication 81 MILLIGRAM(S): at 11:12

## 2024-09-23 RX ADMIN — ATORVASTATIN CALCIUM 40 MILLIGRAM(S): 10 TABLET, FILM COATED ORAL at 21:46

## 2024-09-23 RX ADMIN — Medication 2: at 12:26

## 2024-09-23 RX ADMIN — Medication 5000 UNIT(S): at 05:33

## 2024-09-23 RX ADMIN — Medication 1 TABLET(S): at 11:13

## 2024-09-23 RX ADMIN — PREGABALIN 75 MILLIGRAM(S): 25 CAPSULE ORAL at 05:33

## 2024-09-23 RX ADMIN — Medication 5000 UNIT(S): at 18:04

## 2024-09-23 NOTE — CARE COORDINATION ASSESSMENT. - NSDCPLANSERVICES_GEN_ALL_CORE
This CM met with the pt at the bedside. Introduced myself as the CM explained my role and left contact information. The pt and daughter Patricia who is a Registered Nurse, both verbalized understanding. The pt gave this CM permission to speak to her daughter as well for the assessment and discharge planning. The pt lives in a private home with her granddaughter Dior and Dior's  and children. The pt is independent with ADL's and ambulating without any devices, however has a cane and RW in the home for use. The pt drives herself to her MD appts, however the daughter states that the pt has good family support that will help the pt when she goes home. The PCP is   and the pharmacy is [facility] in [place] on [facility]. The pt is for the OR today for Biliary Colic. No skilled needs anticipated. The wife will transport the pt home once medically cleared. CM team to remain available for post acute needs./No Anticipated Discharge Needs This CM met with the pt at the bedside. Introduced myself as the CM explained my role and left contact information. The pt and daughter Patricia who is a Registered Nurse, both verbalized understanding. The pt gave this CM permission to speak to her daughter as well for the assessment and discharge planning. The pt lives in a private home with her granddaughter Dior and Dior's  and children. The pt is independent with ADL's and ambulating without any devices, however has a cane and RW in the home for use. The pt drives herself to her MD appts, however the daughter states that the pt has good family support that will help the pt when she goes home. The PCP is Dr. Shan Argueta and the pharmacy is [facility] in [facility]. No skilled needs anticipated. The daughter or granddaughter will transport the pt home once medically cleared. The pt has a wound on her leg that she and her family were doing a simple dressing on. The pt will most likely follow up in the wound care center and the pt and family will do the dressing change for the open area on her leg that was once a blister. The daughter who is the nurse will take care of the dressing. CM team to remain available for post acute needs./No Anticipated Discharge Needs

## 2024-09-23 NOTE — PHYSICAL THERAPY INITIAL EVALUATION ADULT - PERTINENT HX OF CURRENT PROBLEM, REHAB EVAL
78 F hx IDDM, HTN, HLD, presents due to generalized weakness, fever admitted for Sepsis secondary to UTI. S/p Aggressive fluid resuscitation and pressor support in ICU. Now transferred to floor 09/23. Renal US noted for: 1.6 cm solid left renal lesion may represent an angiomyolipoma but other   renal lesions may have this appearance. Recommend further evaluation with dedicated pre/postcontrast renal CT scan or MRI for further evaluation. Gallstones and possible gallbladder wall calcification. Patient has no abdominal pain, nor any other GI complaint plan.

## 2024-09-23 NOTE — PHYSICAL THERAPY INITIAL EVALUATION ADULT - ADDITIONAL COMMENTS
Pt lives in a house w/ 3 steps/rail to enter.  Pt is a community ambulator and is able to drive a car.

## 2024-09-23 NOTE — PROGRESS NOTE ADULT - SUBJECTIVE AND OBJECTIVE BOX
CAPILLARY BLOOD GLUCOSE      POCT Blood Glucose.: 251 mg/dL (22 Sep 2024 21:04)  POCT Blood Glucose.: 191 mg/dL (22 Sep 2024 17:31)  POCT Blood Glucose.: 149 mg/dL (22 Sep 2024 12:18)  POCT Blood Glucose.: 142 mg/dL (22 Sep 2024 08:09)      Vital Signs Last 24 Hrs  T(C): 36.9 (23 Sep 2024 06:39), Max: 36.9 (22 Sep 2024 07:45)  T(F): 98.5 (23 Sep 2024 06:39), Max: 98.5 (23 Sep 2024 06:39)  HR: 66 (23 Sep 2024 06:39) (52 - 75)  BP: 137/70 (23 Sep 2024 06:39) (101/80 - 146/60)  BP(mean): 89 (23 Sep 2024 05:37) (79 - 95)  RR: 18 (23 Sep 2024 06:39) (16 - 31)  SpO2: 97% (23 Sep 2024 06:39) (92% - 98%)    Parameters below as of 23 Sep 2024 06:39  Patient On (Oxygen Delivery Method): room air        General: WN/WD NAD  Respiratory: CTA B/L  CV: RRR, S1S2, no murmurs, rubs or gallops  Abdominal: Soft, NT, ND +BS, Last BM  Extremities: No edema, + peripheral pulses     09-22    141  |  110[H]  |  18  ----------------------------<  132[H]  3.9   |  22  |  1.10    Ca    7.7[L]      22 Sep 2024 05:38  Phos  2.2     09-22  Mg     1.7     09-22    TPro  5.8[L]  /  Alb  2.9[L]  /  TBili  0.5  /  DBili  x   /  AST  20  /  ALT  28  /  AlkPhos  82  09-22      atorvastatin 40 milliGRAM(s) Oral at bedtime  dextrose 50% Injectable 25 Gram(s) IV Push once  dextrose 50% Injectable 25 Gram(s) IV Push once  dextrose 50% Injectable 12.5 Gram(s) IV Push once  dextrose Oral Gel 15 Gram(s) Oral once PRN  glucagon  Injectable 1 milliGRAM(s) IntraMuscular once  insulin glargine Injectable (LANTUS) 12 Unit(s) SubCutaneous every morning  insulin lispro (ADMELOG) corrective regimen sliding scale   SubCutaneous at bedtime  insulin lispro (ADMELOG) corrective regimen sliding scale   SubCutaneous three times a day before meals

## 2024-09-23 NOTE — PROGRESS NOTE ADULT - SUBJECTIVE AND OBJECTIVE BOX
Patient is a 78y old  Female who presents with a chief complaint of increased fatigue,  high temperature (23 Sep 2024 07:42)       INTERVAL HPI/OVERNIGHT EVENTS: Patient seen and examined at bedside. Awake, alert, sitting in chair. Denies chest pain, palpitations, sob, n/v/d    MEDICATIONS  (STANDING):  aspirin enteric coated 81 milliGRAM(s) Oral daily  atorvastatin 40 milliGRAM(s) Oral at bedtime  cefTRIAXone   IVPB 1000 milliGRAM(s) IV Intermittent every 24 hours  dextrose 5%. 1000 milliLiter(s) (50 mL/Hr) IV Continuous <Continuous>  dextrose 5%. 1000 milliLiter(s) (100 mL/Hr) IV Continuous <Continuous>  dextrose 50% Injectable 25 Gram(s) IV Push once  dextrose 50% Injectable 25 Gram(s) IV Push once  dextrose 50% Injectable 12.5 Gram(s) IV Push once  glucagon  Injectable 1 milliGRAM(s) IntraMuscular once  heparin   Injectable 5000 Unit(s) SubCutaneous every 12 hours  insulin glargine Injectable (LANTUS) 12 Unit(s) SubCutaneous every morning  insulin lispro (ADMELOG) corrective regimen sliding scale   SubCutaneous at bedtime  insulin lispro (ADMELOG) corrective regimen sliding scale   SubCutaneous three times a day before meals  multivitamin/minerals 1 Tablet(s) Oral daily  pregabalin 75 milliGRAM(s) Oral every 12 hours    MEDICATIONS  (PRN):  acetaminophen     Tablet .. 650 milliGRAM(s) Oral every 6 hours PRN Temp greater or equal to 38C (100.4F), Mild Pain (1 - 3)  dextrose Oral Gel 15 Gram(s) Oral once PRN Blood Glucose LESS THAN 70 milliGRAM(s)/deciliter  melatonin 3 milliGRAM(s) Oral at bedtime PRN Insomnia      Allergies    No Known Allergies    Intolerances        REVIEW OF SYSTEMS:  Per HPI. All other ROS noted Negative   Vital Signs Last 24 Hrs  T(C): 36.9 (23 Sep 2024 06:39), Max: 36.9 (23 Sep 2024 06:39)  T(F): 98.5 (23 Sep 2024 06:39), Max: 98.5 (23 Sep 2024 06:39)  HR: 66 (23 Sep 2024 06:39) (52 - 75)  BP: 137/70 (23 Sep 2024 06:39) (101/80 - 146/60)  BP(mean): 89 (23 Sep 2024 05:37) (79 - 95)  RR: 18 (23 Sep 2024 06:39) (16 - 31)  SpO2: 97% (23 Sep 2024 06:39) (92% - 98%)    Parameters below as of 23 Sep 2024 06:39  Patient On (Oxygen Delivery Method): room air        PHYSICAL EXAM:  GENERAL: NAD, awake, alert   HEENT:  anicteric, moist mucous membranes  CHEST/LUNG:  CTA b/l, no rales, wheezes, or rhonchi  HEART:  RRR, S1, S2  ABDOMEN:  BS+, soft, nontender, nondistended  EXTREMITIES: no edema, cyanosis, or calf tenderness  NERVOUS SYSTEM: awake, alert  skin: warm, dry, no rash     LABS:      Ca    7.7        22 Sep 2024 05:38        CAPILLARY BLOOD GLUCOSE      POCT Blood Glucose.: 251 mg/dL (22 Sep 2024 21:04)  POCT Blood Glucose.: 191 mg/dL (22 Sep 2024 17:31)  POCT Blood Glucose.: 149 mg/dL (22 Sep 2024 12:18)  POCT Blood Glucose.: 142 mg/dL (22 Sep 2024 08:09)    BLOOD CULTURE  09-19 @ 19:04   >100,000 CFU/ml Escherichia coli  --  --  09-19 @ 18:45   No growth at 72 Hours  --  --    RADIOLOGY & ADDITIONAL TESTS:    Imaging Personally Reviewed:  [ ] YES     Consultant(s) Notes Reviewed:      Care Discussed with Consultants/Other Providers:

## 2024-09-23 NOTE — PROGRESS NOTE ADULT - ASSESSMENT
CKD  UTI  Septic shock   DM    -CKD at baseline. 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 No indication for kidney biopsy  -S/p IVF bolus. S/p IVF NS  -ABX renal dose. Renal wise tolerating. No signs of AIN with zosyn   -Urine studies - pending   -Will benefit from restarting RAAS blockade upon dc  -Can continue Metformin with eGFR>30 upon dc  -May have to stop SGLT2i outpatient with urosepsis episode  -Replace Mg and phos     D/w family

## 2024-09-23 NOTE — CASE MANAGEMENT PROGRESS NOTE - NSCMPROGRESSNOTE_GEN_ALL_CORE
Discussed pt on rounds, pt remains acute, on IV rocephin. CM will continue to collaborate with interdisciplinary team and remain available to assist.

## 2024-09-23 NOTE — PROGRESS NOTE ADULT - SUBJECTIVE AND OBJECTIVE BOX
[facility]  INFECTIOUS DISEASES   [address] [place] [address]  Tel: [phone]     Fax: [phone]  ======================================================  MD Ken Ivan Kaushal, MD Cho, Michelle, MD   ======================================================    Assessment/Recommendations:   78-year-old  female with history of insulin-dependent diabetes mellitus hypertension dyslipidemia who presented with generalized weakness fever confusion being admitted for    Complicated UTI with pyelonephritis secondary to E. Coli  Ruled out bacteremia  High fever  Acute metabolic encephalopathy  Acute kidney injury: Improved  Septic shock: Resolved    Patient Seen and examined   WBC improved, afebrile, hemodynamically stable  Trend WBC and temperature curve through hospital course    Cultures:   blood culture: 2 sets: September 19: NTD:  follow until completion  Urine culture: September 19:  E. coli: Pending sensitivities    Imaging:   US Renal (09.22.24): Right renal cyst. 1.6 cm solid left renal lesion may represent an angiomyolipoma but other   renal lesions may have this appearance. Gallstones and possible gallbladder wall calcification.      Antibiotics   Vancomycin and Zosyn discontinued over the weekend transitioned on September 21 to Rocephin 1 g IV daily, continue with that for now    Daily CBC with differential, renal function with electrolytes while inpatient and on antibiotics     recommend endocrinology input as patient's current  hospitalization could have been due to recent increase in Jardiance    Plan explained to patient   D/w Primary team     ________________________________    Last 24 hours:   No overnight events   No further fever  Denied any chills nausea vomiting tolerating diet well denied any diarrhea rash  Denied any dizziness while getting out of bed  Denied any dysuria frequency hematuria    Further ROS:  All systems reviewed. No other complaints besides mentioned above     ______________________________  Allergies    No Known Allergies    Medications:   cefTRIAXone   IVPB 1000 milliGRAM(s) IV Intermittent every 24 hours  acetaminophen     Tablet .. 650 milliGRAM(s) Oral every 6 hours PRN  aspirin enteric coated 81 milliGRAM(s) Oral daily  atorvastatin 40 milliGRAM(s) Oral at bedtime  dextrose 5%. 1000 milliLiter(s) IV Continuous <Continuous>  dextrose 5%. 1000 milliLiter(s) IV Continuous <Continuous>  dextrose 50% Injectable 25 Gram(s) IV Push once  dextrose 50% Injectable 12.5 Gram(s) IV Push once  dextrose 50% Injectable 25 Gram(s) IV Push once  dextrose Oral Gel 15 Gram(s) Oral once PRN  glucagon  Injectable 1 milliGRAM(s) IntraMuscular once  heparin   Injectable 5000 Unit(s) SubCutaneous every 12 hours  insulin glargine Injectable (LANTUS) 12 Unit(s) SubCutaneous every morning  insulin lispro (ADMELOG) corrective regimen sliding scale   SubCutaneous at bedtime  insulin lispro (ADMELOG) corrective regimen sliding scale   SubCutaneous three times a day before meals  melatonin 3 milliGRAM(s) Oral at bedtime PRN  multivitamin/minerals 1 Tablet(s) Oral daily  pregabalin 75 milliGRAM(s) Oral every 12 hours      _________________    PHYSICAL EXAM:    Objective:  Vital Signs Last 24 Hrs  T(C): 36.9 (23 Sep 2024 06:39), Max: 36.9 (23 Sep 2024 06:39)  T(F): 98.5 (23 Sep 2024 06:39), Max: 98.5 (23 Sep 2024 06:39)  HR: 70 (23 Sep 2024 08:59) (52 - 75)  BP: 166/64 (23 Sep 2024 08:59) (115/55 - 166/64)  BP(mean): 89 (23 Sep 2024 05:37) (79 - 95)  RR: 18 (23 Sep 2024 06:39) (16 - 31)  SpO2: 95% (23 Sep 2024 08:59) (93% - 98%)    Parameters below as of 23 Sep 2024 08:59  Patient On (Oxygen Delivery Method): room air    General: No acute distress.  Sitting in chair comfortably   Neuro: AAO*3, No motor, sensory, or cranial nerve deficit  HEENT: Pupils equal, reactive to light, Oral mucosa moist  PULM: Clear to auscultation bilaterally, no significant adventitious breath sounds   CVS: Regular rhythm and controlled rate, no murmurs, rubs, or gallops  ABD: Soft, nondistended, nontender, normoactive bowel sounds, no CVA tenderness  EXT: No b/l LE edema, nontender with pedal pulse palpable ; Healing blister around the left ankle with no further cellulitic changes  SKIN: Warm and well perfused, no acute rashes   NO obvious palpable lymphadenopathy   ______________  LABS, MICROBIOLOGY, RADIOLOGY & ADDITIONAL STUDIES: Reviewed

## 2024-09-23 NOTE — PROGRESS NOTE ADULT - SUBJECTIVE AND OBJECTIVE BOX
[facility]                             Nephrology and Hypertension                             Christopher Cooper                                          [phone]     Patient is a 78y old  Female who presents with a chief complaint of increased fatigue,  high temperature (21 Sep 2024 00:17)       HPI:  78 F hx IDDM, HTN, HLD, presents due to generalized weakness, fever. Pt was feeling weak/tired, fell asleep in her car, in the heat for over 2 hours. Throughout day, she was unable to do her daily tasks secondary to the fatigue. 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 This level of fatigue worried family and prompted them to bring her in to ED. In ED, she was found to be febrile to 102. She states she sometimes feels fatigue after taking her home medications which include many diabetic drugs; however, she has not had any changes in medication in recent time besides a monthly injection for her psoriasis.    In ED was given one dose of ceftriaxone for a positive UA (nitrites, Too numerous bacteria to count though squamous epithelial cells were present and WBC of 25). RVP was negative. Elevated WBC count of 13.33 with Neutrophil % of 82.2. Septic LEO was done (19 Sep 2024 22:55)    Renal consulted for CKD and UTI. 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 Family at bedside      NO N/V/SOB    PAST MEDICAL & SURGICAL HISTORY:  DM (diabetes mellitus)      Benign essential HTN      H/O Bell's palsy      Gout      HLD (hyperlipidemia)      Psoriasis      No significant past surgical history           FAMILY HISTORY:  NC    Social History:Non smoker    MEDICATIONS  (STANDING):  aspirin enteric coated 81 milliGRAM(s) Oral daily  atorvastatin 40 milliGRAM(s) Oral at bedtime  dextrose 5%. 1000 milliLiter(s) (50 mL/Hr) IV Continuous <Continuous>  dextrose 5%. 1000 milliLiter(s) (100 mL/Hr) IV Continuous <Continuous>  dextrose 50% Injectable 25 Gram(s) IV Push once  dextrose 50% Injectable 25 Gram(s) IV Push once  dextrose 50% Injectable 12.5 Gram(s) IV Push once  glucagon  Injectable 1 milliGRAM(s) IntraMuscular once  heparin   Injectable 5000 Unit(s) SubCutaneous every 12 hours  insulin glargine Injectable (LANTUS) 12 Unit(s) SubCutaneous every morning  insulin lispro (ADMELOG) corrective regimen sliding scale   SubCutaneous at bedtime  insulin lispro (ADMELOG) corrective regimen sliding scale   SubCutaneous three times a day before meals  magnesium sulfate  IVPB 2 Gram(s) IV Intermittent once  midodrine. 10 milliGRAM(s) Oral three times a day  multivitamin/minerals 1 Tablet(s) Oral daily  norepinephrine Infusion 0.05 MICROgram(s)/kG/Min (8.25 mL/Hr) IV Continuous <Continuous>  piperacillin/tazobactam IVPB.. 3.375 Gram(s) IV Intermittent every 8 hours  potassium chloride    Tablet ER 40 milliEquivalent(s) Oral once  pregabalin 75 milliGRAM(s) Oral every 12 hours  sodium chloride 0.9%. 1000 milliLiter(s) (100 mL/Hr) IV Continuous <Continuous>  vancomycin  IVPB 1250 milliGRAM(s) IV Intermittent every 24 hours    MEDICATIONS  (PRN):  acetaminophen     Tablet .. 650 milliGRAM(s) Oral every 6 hours PRN Temp greater or equal to 38C (100.4F), Mild Pain (1 - 3)  dextrose Oral Gel 15 Gram(s) Oral once PRN Blood Glucose LESS THAN 70 milliGRAM(s)/deciliter  melatonin 3 milliGRAM(s) Oral at bedtime PRN Insomnia   Meds reviewed    Allergies    No Known Allergies    Intolerances         REVIEW OF SYSTEMS: As per HPI, otherwise negative    ICU Vital Signs Last 24 Hrs  T(C): 36.5 (23 Sep 2024 12:00), Max: 36.9 (23 Sep 2024 06:39)  T(F): 97.7 (23 Sep 2024 12:00), Max: 98.5 (23 Sep 2024 06:39)  HR: 58 (23 Sep 2024 12:00) (58 - 75)  BP: 128/77 (23 Sep 2024 12:00) (116/68 - 166/64)  BP(mean): 89 (23 Sep 2024 05:37) (81 - 95)  ABP: --  ABP(mean): --  RR: 20 (23 Sep 2024 12:00) (16 - 23)  SpO2: 91% (23 Sep 2024 12:00) (91% - 98%)    O2 Parameters below as of 23 Sep 2024 12:00  Patient On (Oxygen Delivery Method): room air                    PHYSICAL EXAM:    GENERAL: NAD  HEAD:  Atraumatic, Normocephalic  EYES: EOMI, conjunctiva and sclera clear  ENMT: No Drainage from nares, No drainage from ears  NECK: Supple, neck  veins full  NERVOUS SYSTEM:  Awake and Alert  EXTREMITIES:  No Edema  SKIN: No rashes No obvious ecchymosis      LABS:                                   9.1    5.51  )-----------( 190      ( 23 Sep 2024 05:15 )             29.5     09-23    141  |  110[H]  |  12  ----------------------------<  164[H]  4.0   |  26  |  0.88    Ca    8.8      23 Sep 2024 05:15  Phos  2.5     09-23  Mg     1.9     09-23    TPro  6.4  /  Alb  2.8[L]  /  TBili  0.3  /  DBili  x   /  AST  13[L]  /  ALT  27  /  AlkPhos  104  09-23      Urinalysis Basic - ( 23 Sep 2024 05:15 )    Color: x / Appearance: x / SG: x / pH: x  Gluc: 164 mg/dL / Ketone: x  / Bili: x / Urobili: x   Blood: x / Protein: x / Nitrite: x   Leuk Esterase: x / RBC: x / WBC x   Sq Epi: x / Non Sq Epi: x / Bacteria: x

## 2024-09-23 NOTE — CARE COORDINATION ASSESSMENT. - NSPASTMEDSURGHISTORY_GEN_ALL_CORE_FT
PAST MEDICAL & SURGICAL HISTORY:  Psoriasis      HLD (hyperlipidemia)      Gout      H/O Bell's palsy      Benign essential HTN      DM (diabetes mellitus)      No significant past surgical history

## 2024-09-23 NOTE — CARE COORDINATION ASSESSMENT. - NSCAREPROVIDERS_GEN_ALL_CORE_FT
CARE PROVIDERS:  Accepting Physician: Manjit Duke  Administration: Eunice Dunne  Administration: Delvin Mora  Administration: Vik Jones  Administration: Geetha Mcclellan  Administration: Salvador Jaime  Admitting: Manjit Duke  Attending: Virginie Huggins  Case Management: Taiwo Vilchis  Case Management: Jolie Lagos  Clinical Doc. Improvement: Vicenta Dunn  Consultant: Alex Prince  Consultant: Valdez Chiu  Consultant: Demarcus Momin  Consultant: Perlman, Craig  Consultant: Siobhan Neil  Consultant: Jose Luis Cooper  ED ACP: Loly Avila  ED Attending: Elia Perry  ED Nurse: Geeta Serrano  ED Nurse 2: Vargas Matta  Nurse: Estefany Monterroso  Nurse: Janae Peoples  Nurse: Jonah Santana  Nurse: Jaiden Reddy  Nurse: Debbie Alas  Nurse: Hesham Olivera  Nurse Edu/Staff Development: Tonya Nguyen  Ordered: ServiceAccount, SCMMLM  Ordered: Doctor, Mauro  Override: Patricia Rodriguez  Override: Jayde Barrow  Override: Debbie Alas  Physical Therapy: Sean Núñez  Physical Therapy: Jazzy Burnette  Primary Team: Mir Etienne  Primary Team: Jolie Ramirez  Primary Team: Virginie Huggins  Primary Team: Verónica Rogers  Primary Team: Kamron Wang  Primary Team: Nelda Garcia  Primary Team: Abrahan Rogers  Primary Team: Willy Dominguez  Primary Team: Parveen Trimble  Primary Team: Robin Porter  Primary Team: Rafy Anderson  Primary Team: Diego Henry  Registered Dietitian: Shireen Craft  : Michelle Henry  Team: [facility], Team  UR// Supp. Assoc.: Bhakti Serrano

## 2024-09-24 LAB
ALBUMIN SERPL ELPH-MCNC: 3 G/DL — LOW (ref 3.3–5)
ALP SERPL-CCNC: 103 U/L — SIGNIFICANT CHANGE UP (ref 40–120)
ALT FLD-CCNC: 25 U/L — SIGNIFICANT CHANGE UP (ref 12–78)
ANION GAP SERPL CALC-SCNC: 8 MMOL/L — SIGNIFICANT CHANGE UP (ref 5–17)
AST SERPL-CCNC: 12 U/L — LOW (ref 15–37)
BASOPHILS # BLD AUTO: 0.05 K/UL — SIGNIFICANT CHANGE UP (ref 0–0.2)
BASOPHILS NFR BLD AUTO: 0.8 % — SIGNIFICANT CHANGE UP (ref 0–2)
BILIRUB SERPL-MCNC: 0.4 MG/DL — SIGNIFICANT CHANGE UP (ref 0.2–1.2)
BUN SERPL-MCNC: 10 MG/DL — SIGNIFICANT CHANGE UP (ref 7–23)
CALCIUM SERPL-MCNC: 9.2 MG/DL — SIGNIFICANT CHANGE UP (ref 8.5–10.1)
CHLORIDE SERPL-SCNC: 108 MMOL/L — SIGNIFICANT CHANGE UP (ref 96–108)
CO2 SERPL-SCNC: 24 MMOL/L — SIGNIFICANT CHANGE UP (ref 22–31)
CREAT SERPL-MCNC: 0.9 MG/DL — SIGNIFICANT CHANGE UP (ref 0.5–1.3)
EGFR: 66 ML/MIN/1.73M2 — SIGNIFICANT CHANGE UP
EOSINOPHIL # BLD AUTO: 0.31 K/UL — SIGNIFICANT CHANGE UP (ref 0–0.5)
EOSINOPHIL NFR BLD AUTO: 5.1 % — SIGNIFICANT CHANGE UP (ref 0–6)
GLUCOSE SERPL-MCNC: 260 MG/DL — HIGH (ref 70–99)
HCT VFR BLD CALC: 29.7 % — LOW (ref 34.5–45)
HGB BLD-MCNC: 9.2 G/DL — LOW (ref 11.5–15.5)
IMM GRANULOCYTES NFR BLD AUTO: 1 % — HIGH (ref 0–0.9)
LYMPHOCYTES # BLD AUTO: 1.37 K/UL — SIGNIFICANT CHANGE UP (ref 1–3.3)
LYMPHOCYTES # BLD AUTO: 22.6 % — SIGNIFICANT CHANGE UP (ref 13–44)
MCHC RBC-ENTMCNC: 27.1 PG — SIGNIFICANT CHANGE UP (ref 27–34)
MCHC RBC-ENTMCNC: 31 GM/DL — LOW (ref 32–36)
MCV RBC AUTO: 87.4 FL — SIGNIFICANT CHANGE UP (ref 80–100)
MONOCYTES # BLD AUTO: 0.48 K/UL — SIGNIFICANT CHANGE UP (ref 0–0.9)
MONOCYTES NFR BLD AUTO: 7.9 % — SIGNIFICANT CHANGE UP (ref 2–14)
NEUTROPHILS # BLD AUTO: 3.8 K/UL — SIGNIFICANT CHANGE UP (ref 1.8–7.4)
NEUTROPHILS NFR BLD AUTO: 62.6 % — SIGNIFICANT CHANGE UP (ref 43–77)
NRBC # BLD: 0 /100 WBCS — SIGNIFICANT CHANGE UP (ref 0–0)
PLATELET # BLD AUTO: 198 K/UL — SIGNIFICANT CHANGE UP (ref 150–400)
POTASSIUM SERPL-MCNC: 4.1 MMOL/L — SIGNIFICANT CHANGE UP (ref 3.5–5.3)
POTASSIUM SERPL-SCNC: 4.1 MMOL/L — SIGNIFICANT CHANGE UP (ref 3.5–5.3)
PROT SERPL-MCNC: 6.6 G/DL — SIGNIFICANT CHANGE UP (ref 6–8.3)
RBC # BLD: 3.4 M/UL — LOW (ref 3.8–5.2)
RBC # FLD: 15.6 % — HIGH (ref 10.3–14.5)
SODIUM SERPL-SCNC: 140 MMOL/L — SIGNIFICANT CHANGE UP (ref 135–145)
WBC # BLD: 6.07 K/UL — SIGNIFICANT CHANGE UP (ref 3.8–10.5)
WBC # FLD AUTO: 6.07 K/UL — SIGNIFICANT CHANGE UP (ref 3.8–10.5)

## 2024-09-24 PROCEDURE — 77001 FLUOROGUIDE FOR VEIN DEVICE: CPT | Mod: 26,59

## 2024-09-24 PROCEDURE — 36573 INSJ PICC RS&I 5 YR+: CPT

## 2024-09-24 PROCEDURE — 76937 US GUIDE VASCULAR ACCESS: CPT | Mod: 26,59

## 2024-09-24 PROCEDURE — 36000 PLACE NEEDLE IN VEIN: CPT | Mod: 59

## 2024-09-24 PROCEDURE — 99239 HOSP IP/OBS DSCHRG MGMT >30: CPT

## 2024-09-24 PROCEDURE — 99233 SBSQ HOSP IP/OBS HIGH 50: CPT

## 2024-09-24 RX ORDER — CHLORHEXIDINE GLUCONATE ORAL RINSE 1.2 MG/ML
1 SOLUTION DENTAL
Refills: 0 | Status: DISCONTINUED | OUTPATIENT
Start: 2024-09-24 | End: 2024-09-25

## 2024-09-24 RX ORDER — VANCOMYCIN HCL-SODIUM CHLORIDE IV SOLN 1.5 GM/250ML-0.9% 1.5-0.9/25 GM/ML-%
750 SOLUTION INTRAVENOUS EVERY 12 HOURS
Refills: 0 | Status: DISCONTINUED | OUTPATIENT
Start: 2024-09-24 | End: 2024-09-25

## 2024-09-24 RX ORDER — SODIUM CHLORIDE 0.9 % (FLUSH) 0.9 %
10 SYRINGE (ML) INJECTION
Refills: 0 | Status: DISCONTINUED | OUTPATIENT
Start: 2024-09-24 | End: 2024-09-25

## 2024-09-24 RX ORDER — ERTAPENEM 1 G/1
1000 INJECTION, POWDER, LYOPHILIZED, FOR SOLUTION INTRAMUSCULAR; INTRAVENOUS EVERY 24 HOURS
Refills: 0 | Status: DISCONTINUED | OUTPATIENT
Start: 2024-09-25 | End: 2024-09-25

## 2024-09-24 RX ORDER — VANCOMYCIN HCL-SODIUM CHLORIDE IV SOLN 1.5 GM/250ML-0.9% 1.5-0.9/25 GM/ML-%
SOLUTION INTRAVENOUS
Refills: 0 | Status: DISCONTINUED | OUTPATIENT
Start: 2024-09-24 | End: 2024-09-25

## 2024-09-24 RX ORDER — ERTAPENEM 1 G/1
1000 INJECTION, POWDER, LYOPHILIZED, FOR SOLUTION INTRAMUSCULAR; INTRAVENOUS ONCE
Refills: 0 | Status: COMPLETED | OUTPATIENT
Start: 2024-09-24 | End: 2024-09-24

## 2024-09-24 RX ORDER — ERTAPENEM 1 G/1
INJECTION, POWDER, LYOPHILIZED, FOR SOLUTION INTRAMUSCULAR; INTRAVENOUS
Refills: 0 | Status: DISCONTINUED | OUTPATIENT
Start: 2024-09-24 | End: 2024-09-25

## 2024-09-24 RX ORDER — VANCOMYCIN HCL-SODIUM CHLORIDE IV SOLN 1.5 GM/250ML-0.9% 1.5-0.9/25 GM/ML-%
1000 SOLUTION INTRAVENOUS ONCE
Refills: 0 | Status: COMPLETED | OUTPATIENT
Start: 2024-09-24 | End: 2024-09-24

## 2024-09-24 RX ADMIN — PREGABALIN 75 MILLIGRAM(S): 25 CAPSULE ORAL at 05:57

## 2024-09-24 RX ADMIN — VANCOMYCIN HCL-SODIUM CHLORIDE IV SOLN 1.5 GM/250ML-0.9% 250 MILLIGRAM(S): 1.5-0.9/25 SOLUTION at 08:59

## 2024-09-24 RX ADMIN — Medication 1 TABLET(S): at 12:39

## 2024-09-24 RX ADMIN — ATORVASTATIN CALCIUM 40 MILLIGRAM(S): 10 TABLET, FILM COATED ORAL at 21:57

## 2024-09-24 RX ADMIN — ERTAPENEM 1000 MILLIGRAM(S): 1 INJECTION, POWDER, LYOPHILIZED, FOR SOLUTION INTRAMUSCULAR; INTRAVENOUS at 08:59

## 2024-09-24 RX ADMIN — Medication 2: at 08:06

## 2024-09-24 RX ADMIN — Medication 5000 UNIT(S): at 17:33

## 2024-09-24 RX ADMIN — Medication 1: at 16:56

## 2024-09-24 RX ADMIN — VANCOMYCIN HCL-SODIUM CHLORIDE IV SOLN 1.5 GM/250ML-0.9% 250 MILLIGRAM(S): 1.5-0.9/25 SOLUTION at 21:57

## 2024-09-24 RX ADMIN — INSULIN GLARGINE 12 UNIT(S): 300 INJECTION, SOLUTION SUBCUTANEOUS at 08:07

## 2024-09-24 RX ADMIN — Medication 5000 UNIT(S): at 05:57

## 2024-09-24 RX ADMIN — Medication 81 MILLIGRAM(S): at 12:39

## 2024-09-24 RX ADMIN — Medication 1: at 12:40

## 2024-09-24 RX ADMIN — PREGABALIN 75 MILLIGRAM(S): 25 CAPSULE ORAL at 17:31

## 2024-09-24 NOTE — PROGRESS NOTE ADULT - SUBJECTIVE AND OBJECTIVE BOX
[facility]  INFECTIOUS DISEASES   [address] [place] [address]  Tel: [phone]     Fax: [phone]  ======================================================  MD Ken Ivan Kaushal, MD Cho, Michelle, MD   ======================================================    Assessment/Recommendations:   78-year-old  female with history of insulin-dependent diabetes mellitus hypertension dyslipidemia who presented with generalized weakness fever confusion being admitted for    Complicated UTI with pyelonephritis secondary to E. Coli  Ruled out bacteremia  High fever  Acute metabolic encephalopathy  Acute kidney injury: Improved  Septic shock: Resolved    Patient Seen and examined   WBC improved, afebrile, hemodynamically stable  Trend WBC and temperature curve through hospital course    Cultures:   blood culture: 2 sets: September 19: NTD:  follow until completion  Urine culture: September 19:  ESBL E. coli and E. Fecalis Pending sensitivities    Imaging:   US Renal (09.22.24): Right renal cyst. 1.6 cm solid left renal lesion may represent an angiomyolipoma but other   renal lesions may have this appearance. Gallstones and possible gallbladder wall calcification.      Antibiotics   Vancomycin and Zosyn discontinued on September 21 to Rocephin 1 g IV daily  Changed from CTX to Ertapenem 1 gm IV daily and Vancomycin IV 1000 mg IV * one hen 750 mg BID then based on AUC  C/w Topical Bactroban until tomorrow then only wound management for LLE wound     Will need PICC line     Daily CBC with differential, renal function with electrolytes while inpatient and on antibiotics     recommend endocrinology input as patient's current  hospitalization could have been due to recent increase in Jardiance    Plan explained to patient and daughter ay length  D/w Primary team     ________________________________    Last 24 hours:   No overnight events   No further fever  Denied any chills nausea vomiting tolerating diet well denied any diarrhea rash  Denied any dizziness while getting out of bed  Denied any dysuria frequency hematuria    Further ROS:  All systems reviewed. No other complaints besides mentioned above     ______________________________  Allergies    No Known Allergies    MEDICATIONS  (STANDING):  aspirin enteric coated 81 milliGRAM(s) Oral daily  atorvastatin 40 milliGRAM(s) Oral at bedtime  dextrose 5%. 1000 milliLiter(s) (100 mL/Hr) IV Continuous <Continuous>  dextrose 5%. 1000 milliLiter(s) (50 mL/Hr) IV Continuous <Continuous>  dextrose 50% Injectable 12.5 Gram(s) IV Push once  dextrose 50% Injectable 25 Gram(s) IV Push once  dextrose 50% Injectable 25 Gram(s) IV Push once  ertapenem  IVPB      glucagon  Injectable 1 milliGRAM(s) IntraMuscular once  heparin   Injectable 5000 Unit(s) SubCutaneous every 12 hours  insulin glargine Injectable (LANTUS) 12 Unit(s) SubCutaneous every morning  insulin lispro (ADMELOG) corrective regimen sliding scale   SubCutaneous three times a day before meals  insulin lispro (ADMELOG) corrective regimen sliding scale   SubCutaneous at bedtime  multivitamin/minerals 1 Tablet(s) Oral daily  pregabalin 75 milliGRAM(s) Oral every 12 hours  vancomycin  IVPB      vancomycin  IVPB 750 milliGRAM(s) IV Intermittent every 12 hours    MEDICATIONS  (PRN):  acetaminophen     Tablet .. 650 milliGRAM(s) Oral every 6 hours PRN Temp greater or equal to 38C (100.4F), Mild Pain (1 - 3)  dextrose Oral Gel 15 Gram(s) Oral once PRN Blood Glucose LESS THAN 70 milliGRAM(s)/deciliter  melatonin 3 milliGRAM(s) Oral at bedtime PRN Insomnia    _________________    PHYSICAL EXAM:    Objective:  Vital Signs Last 24 Hrs  T(C): 36.9 (24 Sep 2024 04:49), Max: 36.9 (24 Sep 2024 04:49)  T(F): 98.4 (24 Sep 2024 04:49), Max: 98.4 (24 Sep 2024 04:49)  HR: 69 (24 Sep 2024 04:49) (54 - 69)  BP: 107/67 (24 Sep 2024 04:49) (107/67 - 135/71)  RR: 20 (24 Sep 2024 04:49) (20 - 20)  SpO2: 94% (24 Sep 2024 04:49) (90% - 94%)  Parameters below as of 24 Sep 2024 04:49  Patient On (Oxygen Delivery Method): room air    General: No acute distress.  Sitting in chair comfortably   Neuro: AAO*3, No motor, sensory, or cranial nerve deficit  HEENT: Pupils equal, reactive to light, Oral mucosa moist  PULM: Clear to auscultation bilaterally, no significant adventitious breath sounds   CVS: Regular rhythm and controlled rate, no murmurs, rubs, or gallops  ABD: Soft, nondistended, nontender, normoactive bowel sounds, no CVA tenderness  EXT: No b/l LE edema, nontender with pedal pulse palpable ; Healing blister around the left ankle with no further cellulitic changes  SKIN: Warm and well perfused, no acute rashes   NO obvious palpable lymphadenopathy   ______________  LABS, MICROBIOLOGY, RADIOLOGY & ADDITIONAL STUDIES: Reviewed

## 2024-09-24 NOTE — PROGRESS NOTE ADULT - SUBJECTIVE AND OBJECTIVE BOX
[facility]                             Nephrology and Hypertension                             Christopher Cooper                                          [phone]     Patient is a 78y old  Female who presents with a chief complaint of increased fatigue,  high temperature (21 Sep 2024 00:17)       HPI:  78 F hx IDDM, HTN, HLD, presents due to generalized weakness, fever. Pt was feeling weak/tired, fell asleep in her car, in the heat for over 2 hours. Throughout day, she was unable to do her daily tasks secondary to the fatigue. 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 This level of fatigue worried family and prompted them to bring her in to ED. In ED, she was found to be febrile to 102. She states she sometimes feels fatigue after taking her home medications which include many diabetic drugs; however, she has not had any changes in medication in recent time besides a monthly injection for her psoriasis.    In ED was given one dose of ceftriaxone for a positive UA (nitrites, Too numerous bacteria to count though squamous epithelial cells were present and WBC of 25). RVP was negative. Elevated WBC count of 13.33 with Neutrophil % of 82.2. Septic LEO was done (19 Sep 2024 22:55)    Renal consulted for CKD and UTI. 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 Family at bedside      NO N/V/SOB    PAST MEDICAL & SURGICAL HISTORY:  DM (diabetes mellitus)      Benign essential HTN      H/O Bell's palsy      Gout      HLD (hyperlipidemia)      Psoriasis      No significant past surgical history           FAMILY HISTORY:  NC    Social History:Non smoker    MEDICATIONS  (STANDING):  aspirin enteric coated 81 milliGRAM(s) Oral daily  atorvastatin 40 milliGRAM(s) Oral at bedtime  dextrose 5%. 1000 milliLiter(s) (50 mL/Hr) IV Continuous <Continuous>  dextrose 5%. 1000 milliLiter(s) (100 mL/Hr) IV Continuous <Continuous>  dextrose 50% Injectable 25 Gram(s) IV Push once  dextrose 50% Injectable 25 Gram(s) IV Push once  dextrose 50% Injectable 12.5 Gram(s) IV Push once  glucagon  Injectable 1 milliGRAM(s) IntraMuscular once  heparin   Injectable 5000 Unit(s) SubCutaneous every 12 hours  insulin glargine Injectable (LANTUS) 12 Unit(s) SubCutaneous every morning  insulin lispro (ADMELOG) corrective regimen sliding scale   SubCutaneous at bedtime  insulin lispro (ADMELOG) corrective regimen sliding scale   SubCutaneous three times a day before meals  magnesium sulfate  IVPB 2 Gram(s) IV Intermittent once  midodrine. 10 milliGRAM(s) Oral three times a day  multivitamin/minerals 1 Tablet(s) Oral daily  norepinephrine Infusion 0.05 MICROgram(s)/kG/Min (8.25 mL/Hr) IV Continuous <Continuous>  piperacillin/tazobactam IVPB.. 3.375 Gram(s) IV Intermittent every 8 hours  potassium chloride    Tablet ER 40 milliEquivalent(s) Oral once  pregabalin 75 milliGRAM(s) Oral every 12 hours  sodium chloride 0.9%. 1000 milliLiter(s) (100 mL/Hr) IV Continuous <Continuous>  vancomycin  IVPB 1250 milliGRAM(s) IV Intermittent every 24 hours    MEDICATIONS  (PRN):  acetaminophen     Tablet .. 650 milliGRAM(s) Oral every 6 hours PRN Temp greater or equal to 38C (100.4F), Mild Pain (1 - 3)  dextrose Oral Gel 15 Gram(s) Oral once PRN Blood Glucose LESS THAN 70 milliGRAM(s)/deciliter  melatonin 3 milliGRAM(s) Oral at bedtime PRN Insomnia   Meds reviewed    Allergies    No Known Allergies    Intolerances         REVIEW OF SYSTEMS: As per HPI, otherwise negative    Vital Signs Last 24 Hrs  T(C): 36.9 (24 Sep 2024 04:49), Max: 36.9 (24 Sep 2024 04:49)  T(F): 98.4 (24 Sep 2024 04:49), Max: 98.4 (24 Sep 2024 04:49)  HR: 69 (24 Sep 2024 04:49) (54 - 69)  BP: 107/67 (24 Sep 2024 04:49) (107/67 - 135/71)  BP(mean): --  RR: 20 (24 Sep 2024 04:49) (20 - 20)  SpO2: 94% (24 Sep 2024 04:49) (90% - 94%)    Parameters below as of 24 Sep 2024 04:49  Patient On (Oxygen Delivery Method): room air        PHYSICAL EXAM:    GENERAL: NAD  HEAD:  Atraumatic, Normocephalic  EYES: EOMI, conjunctiva and sclera clear  ENMT: No Drainage from nares, No drainage from ears  NECK: Supple, neck  veins full  NERVOUS SYSTEM:  Awake and Alert  EXTREMITIES:  No Edema  SKIN: No rashes No obvious ecchymosis      LABS:                        9.2    6.07  )-----------( 198      ( 24 Sep 2024 08:26 )             29.7     09-23    141  |  110[H]  |  12  ----------------------------<  164[H]  4.0   |  26  |  0.88    Ca    8.8      23 Sep 2024 05:15  Phos  2.5     09-23  Mg     1.9     09-23    TPro  6.4  /  Alb  2.8[L]  /  TBili  0.3  /  DBili  x   /  AST  13[L]  /  ALT  27  /  AlkPhos  104  09-23      Urinalysis Basic - ( 23 Sep 2024 05:15 )    Color: x / Appearance: x / SG: x / pH: x  Gluc: 164 mg/dL / Ketone: x  / Bili: x / Urobili: x   Blood: x / Protein: x / Nitrite: x   Leuk Esterase: x / RBC: x / WBC x   Sq Epi: x / Non Sq Epi: x / Bacteria: x

## 2024-09-24 NOTE — PROVIDER CONTACT NOTE (CRITICAL VALUE NOTIFICATION) - ACTION/TREATMENT ORDERED:
Dr. Huggins aware. ID following. IV abx given as ordered. Will continue to monitor.
pt is yto be transferred to icu aweting for bed assignment, zozuleimayn infusing

## 2024-09-24 NOTE — PROCEDURE NOTE - ADDITIONAL PROCEDURE DETAILS
42cm bard single lumen power picc inserted into patent right basilic vein under sterile conditions and image guidance.  Tip is in the SVC.  PICC can be accessed.
Dx:  1. Septic Shock  2. UTI  3. ELDER  4. Lactic Acidosis     Procedure performed independent of CC time spent.    Date of entry of this note is equal to the date of services rendered.

## 2024-09-24 NOTE — PROGRESS NOTE ADULT - SUBJECTIVE AND OBJECTIVE BOX
Patient is a 78y old  Female who presents with a chief complaint of increased fatigue,  high temperature (23 Sep 2024 18:57)       INTERVAL HPI/OVERNIGHT EVENTS: Patient seen and examined at bedside. Denies new symptoms/complaints. No chest pain, palpitations, sob, n/v/d/c     MEDICATIONS  (STANDING):  aspirin enteric coated 81 milliGRAM(s) Oral daily  atorvastatin 40 milliGRAM(s) Oral at bedtime  dextrose 5%. 1000 milliLiter(s) (50 mL/Hr) IV Continuous <Continuous>  dextrose 5%. 1000 milliLiter(s) (100 mL/Hr) IV Continuous <Continuous>  dextrose 50% Injectable 25 Gram(s) IV Push once  dextrose 50% Injectable 25 Gram(s) IV Push once  dextrose 50% Injectable 12.5 Gram(s) IV Push once  ertapenem  IVPB 1000 milliGRAM(s) IV Intermittent once  ertapenem  IVPB      glucagon  Injectable 1 milliGRAM(s) IntraMuscular once  heparin   Injectable 5000 Unit(s) SubCutaneous every 12 hours  insulin glargine Injectable (LANTUS) 12 Unit(s) SubCutaneous every morning  insulin lispro (ADMELOG) corrective regimen sliding scale   SubCutaneous at bedtime  insulin lispro (ADMELOG) corrective regimen sliding scale   SubCutaneous three times a day before meals  multivitamin/minerals 1 Tablet(s) Oral daily  pregabalin 75 milliGRAM(s) Oral every 12 hours  vancomycin  IVPB 1000 milliGRAM(s) IV Intermittent once  vancomycin  IVPB 750 milliGRAM(s) IV Intermittent every 12 hours  vancomycin  IVPB        MEDICATIONS  (PRN):  acetaminophen     Tablet .. 650 milliGRAM(s) Oral every 6 hours PRN Temp greater or equal to 38C (100.4F), Mild Pain (1 - 3)  dextrose Oral Gel 15 Gram(s) Oral once PRN Blood Glucose LESS THAN 70 milliGRAM(s)/deciliter  melatonin 3 milliGRAM(s) Oral at bedtime PRN Insomnia      Allergies    No Known Allergies    Intolerances        REVIEW OF SYSTEMS:  Per HPI. All other ROS Noted Negative   Vital Signs Last 24 Hrs  T(C): 36.9 (24 Sep 2024 04:49), Max: 36.9 (24 Sep 2024 04:49)  T(F): 98.4 (24 Sep 2024 04:49), Max: 98.4 (24 Sep 2024 04:49)  HR: 69 (24 Sep 2024 04:49) (54 - 70)  BP: 107/67 (24 Sep 2024 04:49) (107/67 - 166/64)  BP(mean): --  RR: 20 (24 Sep 2024 04:49) (20 - 20)  SpO2: 94% (24 Sep 2024 04:49) (90% - 95%)    Parameters below as of 24 Sep 2024 04:49  Patient On (Oxygen Delivery Method): room air        PHYSICAL EXAM:  GENERAL: NAD, awake, alert   HEENT:  anicteric, moist mucous membranes  CHEST/LUNG:  CTA b/l, no rales, wheezes, or rhonchi  HEART:  RRR, S1, S2  ABDOMEN:  BS+, soft, nontender, nondistended  EXTREMITIES: no edema, cyanosis, or calf tenderness  NERVOUS SYSTEM: awake, alert  skin: warm, dry, no rash   LABS:      Ca    8.8        23 Sep 2024 05:15        CAPILLARY BLOOD GLUCOSE      POCT Blood Glucose.: 221 mg/dL (24 Sep 2024 07:45)  POCT Blood Glucose.: 222 mg/dL (23 Sep 2024 22:03)  POCT Blood Glucose.: 117 mg/dL (23 Sep 2024 17:36)  POCT Blood Glucose.: 202 mg/dL (23 Sep 2024 11:55)    BLOOD CULTURE    RADIOLOGY & ADDITIONAL TESTS:    Imaging Personally Reviewed:  [ ] YES     Consultant(s) Notes Reviewed:      Care Discussed with Consultants/Other Providers:

## 2024-09-24 NOTE — CASE MANAGEMENT PROGRESS NOTE - NSCMPROGRESSNOTE_GEN_ALL_CORE
CM met with pt and daughters Sangeeta and Venecia. Pt gave verbal consent to speak with family at bedside. Pt / family made aware pt was accepted by [facility] [phone]/ fax [phone]. CM to remain available

## 2024-09-24 NOTE — PROCEDURE NOTE - NSPROCDETAILS_GEN_ALL_CORE
location identified, draped/prepped, sterile technique used/sterile dressing applied/sterile technique, catheter placed/supine position/ultrasound guidance
Ultrasound utilized throughout procedure. First to find adequate vessel and ensure vein was compressible. Second to visualize catheter tip entered vessel. Third to visualize wire entering vessel. Lastly, it was used to confirm that injected saline did not extravasate from vessel/location identified, draped/prepped, sterile technique used/blood seen on insertion/dressing applied/flushes easily/secured in place/sterile technique, catheter placed
1.58

## 2024-09-24 NOTE — PROCEDURE NOTE - NSPOSTCAREGUIDE_GEN_A_CORE
Verbal/written post procedure instructions were given to patient/caregiver/Instructed patient/caregiver regarding signs and symptoms of infection
Keep the cast/splint/dressing clean and dry/Care for catheter as per unit/ICU protocols

## 2024-09-24 NOTE — PROGRESS NOTE ADULT - ASSESSMENT
ELDER/CKD  UTI  Septic shock   DM    -CKD at baseline. 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 No indication for kidney biopsy. S/p ELDER  -S/p IVF bolus. S/p IVF NS  -ABX renal dose. Renal wise tolerating. No signs of AIN with zosyn   -Urine studies - pending   -Will benefit from restarting RAAS blockade upon dc  -Can continue Metformin with eGFR>30 upon dc  -May have to stop SGLT2i outpatient with urosepsis episode  -Replaced Mg and phos     D/w family

## 2024-09-24 NOTE — PROGRESS NOTE ADULT - ASSESSMENT
78 F hx IDDM, HTN, HLD, presents due to generalized weakness, fever admitted for Sepsis secondary to ESBL  E coli UTI

## 2024-09-24 NOTE — CASE MANAGEMENT PROGRESS NOTE - NSCMPROGRESSNOTE_GEN_ALL_CORE
Discussed pt on rounds, pt remains acute, +ESBL / ecoli, on IV Vanco, on IV invanz, for PICC placement. CM will continue to collaborate with interdisciplinary team and remain available to assist.

## 2024-09-24 NOTE — PHARMACOTHERAPY INTERVENTION NOTE - COMMENTS
78 year old female being treated for complicated UTI with ertapenem 1000mg q24hr and vancomycin 750mg q12hrs. On 09/19 urine clean catch culture grew E. coli ESBL and Enterococcus faecalis. Based on Ignacio antibiogram, Enterococcus faecalis has 100% susceptibility to ampicillin. Discussed with ID Dr. Rogers and recommended de-escalation to meropenem monotherapy. Per clinical judgement, MD would like to continue with current regimen until sensitivities result.

## 2024-09-24 NOTE — PATIENT CHOICE NOTE. - NSPTCHOICENOTES_GEN_ALL_CORE
D/C resource folder provided at bedside which includes lists for both rehab facilities and home care agencies and transportation letter advising on ambulance and ambulette transportation. CM reviewed folder during assessment. Pt chose [facility] [phone] and [facility] [phone]/ fax [phone] CM sent appropriate referrals

## 2024-09-25 ENCOUNTER — TRANSCRIPTION ENCOUNTER (OUTPATIENT)
Age: 79
End: 2024-09-25

## 2024-09-25 VITALS
DIASTOLIC BLOOD PRESSURE: 67 MMHG | WEIGHT: 203.05 LBS | RESPIRATION RATE: 18 BRPM | SYSTOLIC BLOOD PRESSURE: 112 MMHG | HEART RATE: 82 BPM | OXYGEN SATURATION: 94 % | TEMPERATURE: 98 F

## 2024-09-25 LAB
ANION GAP SERPL CALC-SCNC: 6 MMOL/L — SIGNIFICANT CHANGE UP (ref 5–17)
BUN SERPL-MCNC: 13 MG/DL — SIGNIFICANT CHANGE UP (ref 7–23)
CALCIUM SERPL-MCNC: 9.4 MG/DL — SIGNIFICANT CHANGE UP (ref 8.5–10.1)
CHLORIDE SERPL-SCNC: 107 MMOL/L — SIGNIFICANT CHANGE UP (ref 96–108)
CO2 SERPL-SCNC: 27 MMOL/L — SIGNIFICANT CHANGE UP (ref 22–31)
CREAT SERPL-MCNC: 0.89 MG/DL — SIGNIFICANT CHANGE UP (ref 0.5–1.3)
CULTURE RESULTS: SIGNIFICANT CHANGE UP
CULTURE RESULTS: SIGNIFICANT CHANGE UP
EGFR: 67 ML/MIN/1.73M2 — SIGNIFICANT CHANGE UP
GLUCOSE SERPL-MCNC: 193 MG/DL — HIGH (ref 70–99)
HCT VFR BLD CALC: 28.7 % — LOW (ref 34.5–45)
HGB BLD-MCNC: 9.1 G/DL — LOW (ref 11.5–15.5)
MCHC RBC-ENTMCNC: 27.4 PG — SIGNIFICANT CHANGE UP (ref 27–34)
MCHC RBC-ENTMCNC: 31.7 GM/DL — LOW (ref 32–36)
MCV RBC AUTO: 86.4 FL — SIGNIFICANT CHANGE UP (ref 80–100)
NRBC # BLD: 0 /100 WBCS — SIGNIFICANT CHANGE UP (ref 0–0)
PLATELET # BLD AUTO: 199 K/UL — SIGNIFICANT CHANGE UP (ref 150–400)
POTASSIUM SERPL-MCNC: 3.8 MMOL/L — SIGNIFICANT CHANGE UP (ref 3.5–5.3)
POTASSIUM SERPL-SCNC: 3.8 MMOL/L — SIGNIFICANT CHANGE UP (ref 3.5–5.3)
RBC # BLD: 3.32 M/UL — LOW (ref 3.8–5.2)
RBC # FLD: 15.6 % — HIGH (ref 10.3–14.5)
SODIUM SERPL-SCNC: 140 MMOL/L — SIGNIFICANT CHANGE UP (ref 135–145)
SPECIMEN SOURCE: SIGNIFICANT CHANGE UP
SPECIMEN SOURCE: SIGNIFICANT CHANGE UP
WBC # BLD: 6.73 K/UL — SIGNIFICANT CHANGE UP (ref 3.8–10.5)
WBC # FLD AUTO: 6.73 K/UL — SIGNIFICANT CHANGE UP (ref 3.8–10.5)

## 2024-09-25 PROCEDURE — C1751: CPT

## 2024-09-25 PROCEDURE — 85610 PROTHROMBIN TIME: CPT

## 2024-09-25 PROCEDURE — 36573 INSJ PICC RS&I 5 YR+: CPT

## 2024-09-25 PROCEDURE — 97162 PT EVAL MOD COMPLEX 30 MIN: CPT

## 2024-09-25 PROCEDURE — 80053 COMPREHEN METABOLIC PANEL: CPT

## 2024-09-25 PROCEDURE — 83735 ASSAY OF MAGNESIUM: CPT

## 2024-09-25 PROCEDURE — 85025 COMPLETE CBC W/AUTO DIFF WBC: CPT

## 2024-09-25 PROCEDURE — 85027 COMPLETE CBC AUTOMATED: CPT

## 2024-09-25 PROCEDURE — 87040 BLOOD CULTURE FOR BACTERIA: CPT

## 2024-09-25 PROCEDURE — 76775 US EXAM ABDO BACK WALL LIM: CPT

## 2024-09-25 PROCEDURE — 93306 TTE W/DOPPLER COMPLETE: CPT

## 2024-09-25 PROCEDURE — P9047: CPT

## 2024-09-25 PROCEDURE — 84550 ASSAY OF BLOOD/URIC ACID: CPT

## 2024-09-25 PROCEDURE — 36415 COLL VENOUS BLD VENIPUNCTURE: CPT

## 2024-09-25 PROCEDURE — 96375 TX/PRO/DX INJ NEW DRUG ADDON: CPT

## 2024-09-25 PROCEDURE — 71045 X-RAY EXAM CHEST 1 VIEW: CPT

## 2024-09-25 PROCEDURE — 80061 LIPID PANEL: CPT

## 2024-09-25 PROCEDURE — 84100 ASSAY OF PHOSPHORUS: CPT

## 2024-09-25 PROCEDURE — 83036 HEMOGLOBIN GLYCOSYLATED A1C: CPT

## 2024-09-25 PROCEDURE — 80048 BASIC METABOLIC PNL TOTAL CA: CPT

## 2024-09-25 PROCEDURE — 74183 MRI ABD W/O CNTR FLWD CNTR: CPT | Mod: MC

## 2024-09-25 PROCEDURE — 83605 ASSAY OF LACTIC ACID: CPT

## 2024-09-25 PROCEDURE — A9579: CPT

## 2024-09-25 PROCEDURE — 93005 ELECTROCARDIOGRAM TRACING: CPT

## 2024-09-25 PROCEDURE — 87086 URINE CULTURE/COLONY COUNT: CPT

## 2024-09-25 PROCEDURE — 0225U NFCT DS DNA&RNA 21 SARSCOV2: CPT

## 2024-09-25 PROCEDURE — 87186 SC STD MICRODIL/AGAR DIL: CPT

## 2024-09-25 PROCEDURE — 96374 THER/PROPH/DIAG INJ IV PUSH: CPT

## 2024-09-25 PROCEDURE — 99239 HOSP IP/OBS DSCHRG MGMT >30: CPT

## 2024-09-25 PROCEDURE — 87641 MR-STAPH DNA AMP PROBE: CPT

## 2024-09-25 PROCEDURE — 87640 STAPH A DNA AMP PROBE: CPT

## 2024-09-25 PROCEDURE — 82962 GLUCOSE BLOOD TEST: CPT

## 2024-09-25 PROCEDURE — 96376 TX/PRO/DX INJ SAME DRUG ADON: CPT

## 2024-09-25 PROCEDURE — 99233 SBSQ HOSP IP/OBS HIGH 50: CPT

## 2024-09-25 PROCEDURE — 85730 THROMBOPLASTIN TIME PARTIAL: CPT

## 2024-09-25 PROCEDURE — 81001 URINALYSIS AUTO W/SCOPE: CPT

## 2024-09-25 PROCEDURE — 76937 US GUIDE VASCULAR ACCESS: CPT

## 2024-09-25 PROCEDURE — 87077 CULTURE AEROBIC IDENTIFY: CPT

## 2024-09-25 PROCEDURE — 99285 EMERGENCY DEPT VISIT HI MDM: CPT | Mod: 25

## 2024-09-25 RX ORDER — ERTAPENEM 1 G/1
1 INJECTION, POWDER, LYOPHILIZED, FOR SOLUTION INTRAMUSCULAR; INTRAVENOUS
Qty: 0 | Refills: 0 | DISCHARGE
Start: 2024-09-25 | End: 2024-09-30

## 2024-09-25 RX ORDER — INSULIN LISPRO 100/ML
VIAL (ML) SUBCUTANEOUS AT BEDTIME
Refills: 0 | Status: DISCONTINUED | OUTPATIENT
Start: 2024-09-25 | End: 2024-09-25

## 2024-09-25 RX ORDER — INSULIN LISPRO 100/ML
VIAL (ML) SUBCUTANEOUS
Refills: 0 | Status: DISCONTINUED | OUTPATIENT
Start: 2024-09-25 | End: 2024-09-25

## 2024-09-25 RX ORDER — ENOXAPARIN SODIUM 150 MG/ML
40 INJECTION SUBCUTANEOUS EVERY 24 HOURS
Refills: 0 | Status: DISCONTINUED | OUTPATIENT
Start: 2024-09-26 | End: 2024-09-25

## 2024-09-25 RX ADMIN — Medication 5000 UNIT(S): at 05:30

## 2024-09-25 RX ADMIN — Medication 81 MILLIGRAM(S): at 11:48

## 2024-09-25 RX ADMIN — INSULIN GLARGINE 12 UNIT(S): 300 INJECTION, SOLUTION SUBCUTANEOUS at 09:13

## 2024-09-25 RX ADMIN — Medication 2: at 08:05

## 2024-09-25 RX ADMIN — PREGABALIN 75 MILLIGRAM(S): 25 CAPSULE ORAL at 08:05

## 2024-09-25 RX ADMIN — Medication 1 TABLET(S): at 11:48

## 2024-09-25 RX ADMIN — ERTAPENEM 120 MILLIGRAM(S): 1 INJECTION, POWDER, LYOPHILIZED, FOR SOLUTION INTRAMUSCULAR; INTRAVENOUS at 10:58

## 2024-09-25 RX ADMIN — Medication 1 TABLET(S): at 10:58

## 2024-09-25 NOTE — PROGRESS NOTE ADULT - PROBLEM SELECTOR PLAN 7
on Bimzelx 1x a month given to her by her dermatologist  patient can follow up outpatient with derm for next injection
on febuxostat 40 mg daily-> nonformulary  please ask patient's family to bring it as it is a nonformulary
on Bimzelx 1x a month given to her by her dermatologist  patient can follow up outpatient with derm for next injection
on febuxostat 40 mg daily-> nonformulary  will send uric acid level for AM  please ask patient's family to bring it as it is a nonformulary
on Bimzelx 1x a month given to her by her dermatologist  patient can follow up outpatient with derm for next injection
on febuxostat 40 mg daily-> nonformulary  will send uric acid level for AM  please ask patient's family to bring it as it is a nonformulary

## 2024-09-25 NOTE — PROGRESS NOTE ADULT - PROBLEM SELECTOR PLAN 8
on Bimzelx 1x a month given to her by her dermatologist  patient can follow up outpatient with derm for next injection
on heparin 5000 q12 BID for dvt prophylaxis  Diet: Consistent Carb w/ Evening Snack  PT evaluation  Wound care for skin abrasions on L ankle.
on Bimzelx 1x a month given to her by her dermatologist  patient can follow up outpatient with derm for next injection
on heparin 5000 q12 BID for dvt prophylaxis  Diet: Consistent Carb w/ Evening Snack  PT evaluation  Wound care for skin abrasions on L ankle.
on heparin 5000 q12 BID for dvt prophylaxis  Diet: Consistent Carb w/ Evening Snack
on Bimzelx 1x a month given to her by her dermatologist  patient can follow up outpatient with derm for next injection

## 2024-09-25 NOTE — PROGRESS NOTE ADULT - SUBJECTIVE AND OBJECTIVE BOX
[facility]  INFECTIOUS DISEASES   [address] [place] [address]  Tel: [phone]     Fax: [phone]  ======================================================  MD Ken Ivan Kaushal, MD Cho, Michelle, MD   ======================================================    Assessment/Recommendations:   78-year-old  female with history of insulin-dependent diabetes mellitus hypertension dyslipidemia who presented with generalized weakness fever confusion being admitted for    Complicated UTI with pyelonephritis secondary to E. Coli  Ruled out bacteremia  High fever: Resolved  Acute metabolic encephalopathy  Acute kidney injury: Improved  Septic shock: Resolved    Patient Seen and examined   WBC normalized, afebrile, hemodynamically stable  Trend WBC and temperature curve through hospital course    Cultures:   blood culture: 2 sets: September 19: NTD:  follow until completion  Urine culture: September 19:  ESBL E. coli and E. Fecalis susceptible to Ampicillin    Imaging:   US Renal (09.22.24): Right renal cyst. 1.6 cm solid left renal lesion may represent an angiomyolipoma but other   renal lesions may have this appearance. Gallstones and possible gallbladder wall calcification.      Antibiotics   Vancomycin and Zosyn discontinued on September 21 to Rocephin 1 g IV daily  Changed from CTX to Ertapenem 1 gm IV daily and Vancomycin IV 1000 mg IV * one hen 750 mg BID on 9.24; now stopped IV Vancomycin today and transitioned to Augmentin 500/125 mg TID    Discharge Abx:   Ertapenem 1 gm IV Daily Last date 9.30.24  (prescription sent over to [facility])  Augmentin 500/125 mg PO TID Last date 9.30.24     Follow With primary care after finishing antibiotics preferably with CBC CMP magnesium phosphorus    C/w Topical Bactroban until tomorrow then only wound management for LLE wound    recommend follow-up with wound clinic    Will need PICC line     Daily CBC with differential, renal function with electrolytes while inpatient and on antibiotics     recommend endocrinology input as patient's current  hospitalization could have been due to recent increase in Jardiance    Plan explained to patient and daughter ay length  D/w Primary team     ________________________________    Last 24 hours:   No overnight events   No further fever  Denied any chills nausea vomiting tolerating diet well denied any diarrhea rash  Denied any dizziness while getting out of bed  Denied any dysuria frequency hematuria    Further ROS:  All systems reviewed. No other complaints besides mentioned above     ______________________________  Allergies    No Known Allergies    MEDICATIONS  (STANDING):  amoxicillin  500 milliGRAM(s)/clavulanate 1 Tablet(s) Oral three times a day  aspirin enteric coated 81 milliGRAM(s) Oral daily  atorvastatin 40 milliGRAM(s) Oral at bedtime  chlorhexidine 2% Cloths 1 Application(s) Topical <User Schedule>  dextrose 5%. 1000 milliLiter(s) (100 mL/Hr) IV Continuous <Continuous>  dextrose 5%. 1000 milliLiter(s) (50 mL/Hr) IV Continuous <Continuous>  dextrose 50% Injectable 12.5 Gram(s) IV Push once  dextrose 50% Injectable 25 Gram(s) IV Push once  dextrose 50% Injectable 25 Gram(s) IV Push once  ertapenem  IVPB      ertapenem  IVPB 1000 milliGRAM(s) IV Intermittent every 24 hours  glucagon  Injectable 1 milliGRAM(s) IntraMuscular once  insulin glargine Injectable (LANTUS) 12 Unit(s) SubCutaneous every morning  insulin lispro (ADMELOG) corrective regimen sliding scale   SubCutaneous three times a day before meals  insulin lispro (ADMELOG) corrective regimen sliding scale   SubCutaneous at bedtime  multivitamin/minerals 1 Tablet(s) Oral daily  pregabalin 75 milliGRAM(s) Oral every 12 hours    MEDICATIONS  (PRN):  acetaminophen     Tablet .. 650 milliGRAM(s) Oral every 6 hours PRN Temp greater or equal to 38C (100.4F), Mild Pain (1 - 3)  dextrose Oral Gel 15 Gram(s) Oral once PRN Blood Glucose LESS THAN 70 milliGRAM(s)/deciliter  melatonin 3 milliGRAM(s) Oral at bedtime PRN Insomnia  sodium chloride 0.9% lock flush 10 milliLiter(s) IV Push every 1 hour PRN Pre/post blood products, medications, blood draw, and to maintain line patency      _________________    PHYSICAL EXAM:    Vital Signs Last 24 Hrs  T(C): 36.8 (25 Sep 2024 05:11), Max: 37 (24 Sep 2024 20:43)  T(F): 98.3 (25 Sep 2024 05:11), Max: 98.6 (24 Sep 2024 20:43)  HR: 82 (25 Sep 2024 05:11) (60 - 82)  BP: 112/67 (25 Sep 2024 05:11) (112/67 - 169/80)  RR: 18 (25 Sep 2024 05:11) (18 - 20)  SpO2: 94% (25 Sep 2024 05:11) (91% - 94%)    Parameters below as of 25 Sep 2024 05:11  Patient On (Oxygen Delivery Method): room air        General: No acute distress.  Sitting in chair comfortably   Neuro: AAO*3, No motor, sensory, or cranial nerve deficit  HEENT: Pupils equal, reactive to light, Oral mucosa moist  PULM: Clear to auscultation bilaterally, no significant adventitious breath sounds   CVS: Regular rhythm and controlled rate, no murmurs, rubs, or gallops  ABD: Soft, nondistended, nontender, normoactive bowel sounds, no CVA tenderness  EXT: No b/l LE edema, nontender with pedal pulse palpable ; Healing blister around the left ankle with no further cellulitic changes  SKIN: Warm and well perfused, no acute rashes   NO obvious palpable lymphadenopathy   ______________  LABS, MICROBIOLOGY, RADIOLOGY & ADDITIONAL STUDIES: Reviewed

## 2024-09-25 NOTE — CAREGIVER ENGAGEMENT NOTE - CAREGIVER OUTREACH NOTES - FREE TEXT
CM met with pt and daughters Sangeeta and Venecia to discuss transition of care. Patient gave consent to speak with family present.  CM explained role to caregiver who verbalized understanding. Pt caregiver was made aware a CM will remain available through hospitalization. CM discussed home care at length. Family verbalized understanding. CM to make appropriate referrals. CM to remain available 
Per MD pt is medically cleared for discharge today 9/25/24. CM met with patient and daughter Venecia to discuss transition of care. Pt gave verbal consent to speak with daughter at bedside. Pt is to be transitioned to home with [facility] [phone] / fax [phone]. Pt is to be transitioned to home with [facility] [phone]/ fax [phone]. CM explained home care expectations, process, insurance provisions and home safety with pt verbalizing understanding.  Pt states she has 6 daughters and family will assist post discharge. CM confirmed with home care agency, pt case is being accepted and home care was made aware of DC plan today 9/25/24 with SOC 9/26/24. Pt and family aware of plan and in agreement / verbalize understanding. IMM discussed / given. Pt verbalized understanding. Confirmed pharmacy is [facility]. community MD is Dr. Argueta. Pt stated they would make their own follow up appointments. DMEs in home include walker and cane. Pt stated her daughter will transport pt home. Pt / daughter stated RN demonstrated technique of IV antibiotic administration, pt / daughter verbalized understanding of procedures.  CM discussed plan with MD and RN. CM to remain available through hospitalization.

## 2024-09-25 NOTE — PROGRESS NOTE ADULT - PROBLEM SELECTOR PROBLEM 2
Stable diabetes mellitus

## 2024-09-25 NOTE — PROGRESS NOTE ADULT - PROBLEM SELECTOR PLAN 6
on febuxostat 40 mg daily-> nonformulary  please ask patient's family to bring it as it is a nonformulary
continue atorvastatin 40  on vascepa 1 gram daily-> nonformulary can ask patient's family to bring in  AM lipid panel
continue atorvastatin 40  on vascepa 1 gram daily-> nonformulary can ask patient's family to bring in
continue atorvastatin 40  on vascepa 1 gram daily-> nonformulary can ask patient's family to bring in  AM lipid panel
on febuxostat 40 mg daily-> nonformulary  please ask patient's family to bring it as it is a nonformulary
on febuxostat 40 mg daily-> nonformulary  please ask patient's family to bring it as it is a nonformulary

## 2024-09-25 NOTE — PROGRESS NOTE ADULT - ASSESSMENT
78 F hx IDDM, HTN, HLD, presents due to generalized weakness, fever admitted for Sepsis secondary to ESBL  E coli and enterococcus UTI.

## 2024-09-25 NOTE — PROGRESS NOTE ADULT - SUBJECTIVE AND OBJECTIVE BOX
CAPILLARY BLOOD GLUCOSE      POCT Blood Glucose.: 228 mg/dL (25 Sep 2024 07:42)  POCT Blood Glucose.: 234 mg/dL (24 Sep 2024 20:50)  POCT Blood Glucose.: 193 mg/dL (24 Sep 2024 16:32)  POCT Blood Glucose.: 187 mg/dL (24 Sep 2024 12:05)      Vital Signs Last 24 Hrs  T(C): 36.8 (25 Sep 2024 05:11), Max: 37 (24 Sep 2024 20:43)  T(F): 98.3 (25 Sep 2024 05:11), Max: 98.6 (24 Sep 2024 20:43)  HR: 82 (25 Sep 2024 05:11) (60 - 82)  BP: 112/67 (25 Sep 2024 05:11) (112/67 - 169/80)  BP(mean): --  RR: 18 (25 Sep 2024 05:11) (18 - 20)  SpO2: 94% (25 Sep 2024 05:11) (91% - 94%)    Parameters below as of 25 Sep 2024 05:11  Patient On (Oxygen Delivery Method): room air        General: WN/WD NAD  Respiratory: CTA B/L  CV: RRR, S1S2, no murmurs, rubs or gallops  Abdominal: Soft, NT, ND +BS, Last BM  Extremities: No edema, + peripheral pulses     09-25    140  |  107  |  13  ----------------------------<  193[H]  3.8   |  27  |  0.89    Ca    9.4      25 Sep 2024 05:32    TPro  6.6  /  Alb  3.0[L]  /  TBili  0.4  /  DBili  x   /  AST  12[L]  /  ALT  25  /  AlkPhos  103  09-24      atorvastatin 40 milliGRAM(s) Oral at bedtime  dextrose 50% Injectable 25 Gram(s) IV Push once  dextrose 50% Injectable 25 Gram(s) IV Push once  dextrose 50% Injectable 12.5 Gram(s) IV Push once  dextrose Oral Gel 15 Gram(s) Oral once PRN  glucagon  Injectable 1 milliGRAM(s) IntraMuscular once  insulin glargine Injectable (LANTUS) 12 Unit(s) SubCutaneous every morning  insulin lispro (ADMELOG) corrective regimen sliding scale   SubCutaneous at bedtime  insulin lispro (ADMELOG) corrective regimen sliding scale   SubCutaneous three times a day before meals

## 2024-09-25 NOTE — PROGRESS NOTE ADULT - NSPROGADDITIONALINFOA_GEN_ALL_CORE
Patient medically stable for DC. Discussed with ID. Discussed with daughter. Needs to follow up with PCP, endocrine, and wound care    DC Time: 35 minutes

## 2024-09-25 NOTE — PROGRESS NOTE ADULT - PROBLEM SELECTOR PLAN 4
on lisinopril 20, Metoprolol Succ 50 mg, Triamterene     will hold antihypertensives given sepsis (hypotensive) picture
No previous records seen on [facility]  daughter states Cr is at her baseline, but GFR is lower than usual at 39    plan  obtain outpatient records  trend Cr/ GFR  if abnormal from baseline consider Nephro consult while inpatient
on lisinopril 20, Metoprolol Succ 50 mg, Triamterene     will hold antihypertensives given sepsis (hypotensive) picture
on lisinopril 20, Metoprolol Succ 50 mg, Triamterene     will hold antihypertensives given sepsis (hypotensive) picture
No previous records seen on [facility]  daughter states Cr is at her baseline, but GFR is lower than usual at 39    Cr improved  Renal following  OP follow up
No previous records seen on [facility]  daughter states Cr is at her baseline, but GFR is lower than usual at 39    plan  obtain outpatient records  trend Cr/ GFR  if abnormal from baseline consider Nephro consult while inpatient

## 2024-09-25 NOTE — PROGRESS NOTE ADULT - SUBJECTIVE AND OBJECTIVE BOX
Patient is a 78y old  Female who presents with a chief complaint of increased fatigue,  high temperature (25 Sep 2024 09:28)      SUBJECTIVE / OVERNIGHT EVENTS: Patient seen and examined at bedside. No acute events overnight. Feels okay. No complaints. No dysuria. ID at bedside.    MEDICATIONS  (STANDING):  amoxicillin  500 milliGRAM(s)/clavulanate 1 Tablet(s) Oral three times a day  aspirin enteric coated 81 milliGRAM(s) Oral daily  atorvastatin 40 milliGRAM(s) Oral at bedtime  chlorhexidine 2% Cloths 1 Application(s) Topical <User Schedule>  dextrose 5%. 1000 milliLiter(s) (100 mL/Hr) IV Continuous <Continuous>  dextrose 5%. 1000 milliLiter(s) (50 mL/Hr) IV Continuous <Continuous>  dextrose 50% Injectable 25 Gram(s) IV Push once  dextrose 50% Injectable 12.5 Gram(s) IV Push once  dextrose 50% Injectable 25 Gram(s) IV Push once  ertapenem  IVPB      ertapenem  IVPB 1000 milliGRAM(s) IV Intermittent every 24 hours  glucagon  Injectable 1 milliGRAM(s) IntraMuscular once  insulin glargine Injectable (LANTUS) 12 Unit(s) SubCutaneous every morning  insulin lispro (ADMELOG) corrective regimen sliding scale   SubCutaneous at bedtime  insulin lispro (ADMELOG) corrective regimen sliding scale   SubCutaneous three times a day before meals  multivitamin/minerals 1 Tablet(s) Oral daily  pregabalin 75 milliGRAM(s) Oral every 12 hours    MEDICATIONS  (PRN):  acetaminophen     Tablet .. 650 milliGRAM(s) Oral every 6 hours PRN Temp greater or equal to 38C (100.4F), Mild Pain (1 - 3)  dextrose Oral Gel 15 Gram(s) Oral once PRN Blood Glucose LESS THAN 70 milliGRAM(s)/deciliter  melatonin 3 milliGRAM(s) Oral at bedtime PRN Insomnia  sodium chloride 0.9% lock flush 10 milliLiter(s) IV Push every 1 hour PRN Pre/post blood products, medications, blood draw, and to maintain line patency      CAPILLARY BLOOD GLUCOSE      POCT Blood Glucose.: 228 mg/dL (25 Sep 2024 07:42)  POCT Blood Glucose.: 234 mg/dL (24 Sep 2024 20:50)  POCT Blood Glucose.: 193 mg/dL (24 Sep 2024 16:32)  POCT Blood Glucose.: 187 mg/dL (24 Sep 2024 12:05)    I&O's Summary      PHYSICAL EXAM:  Vital Signs Last 24 Hrs  T(C): 36.8 (25 Sep 2024 05:11), Max: 37 (24 Sep 2024 20:43)  T(F): 98.3 (25 Sep 2024 05:11), Max: 98.6 (24 Sep 2024 20:43)  HR: 82 (25 Sep 2024 05:11) (60 - 82)  BP: 112/67 (25 Sep 2024 05:11) (112/67 - 169/80)  BP(mean): --  RR: 18 (25 Sep 2024 05:11) (18 - 20)  SpO2: 94% (25 Sep 2024 05:11) (91% - 94%)    Parameters below as of 25 Sep 2024 05:11  Patient On (Oxygen Delivery Method): room air        GEN: female in NAD, appears comfortable, no diaphoresis  EYES: No scleral injection, EOMI  ENTM: neck supple & symmetric without tracheal deviation, moist membranes, no gross hearing impairment, thyroid gland not enlarged  CV: +S1/S2, no m/r/g, no abdominal bruit, no LE edema  RESP: breathing comfortably, no respiratory accessory muscle use, CTAB, no w/r/r  GI: normoactive BS, soft, NTND, no rebounding/guarding, no palpable masses    LABS:                        9.1    6.73  )-----------( 199      ( 25 Sep 2024 05:32 )             28.7     09-25    140  |  107  |  13  ----------------------------<  193[H]  3.8   |  27  |  0.89    Ca    9.4      25 Sep 2024 05:32    TPro  6.6  /  Alb  3.0[L]  /  TBili  0.4  /  DBili  x   /  AST  12[L]  /  ALT  25  /  AlkPhos  103  09-24          Urinalysis Basic - ( 25 Sep 2024 05:32 )    Color: x / Appearance: x / SG: x / pH: x  Gluc: 193 mg/dL / Ketone: x  / Bili: x / Urobili: x   Blood: x / Protein: x / Nitrite: x   Leuk Esterase: x / RBC: x / WBC x   Sq Epi: x / Non Sq Epi: x / Bacteria: x          RADIOLOGY & ADDITIONAL TESTS:  Results Reviewed:   Imaging Personally Reviewed:  Electrocardiogram Personally Reviewed:    COORDINATION OF CARE:  Care Discussed with Consultants/Other Providers [Y/N]:  Prior or Outpatient Records Reviewed [Y/N]:

## 2024-09-25 NOTE — PROGRESS NOTE ADULT - PROBLEM SELECTOR PLAN 1
cont lantus 12 units daily  change mod dose admelog corrective scale qac/qhs  pt to resume outpatient anti-diabetes regimen upon d/c  cont cons cho diet  goal bg 100-180 in hosp setting
cont lantus 12 units qam  cont admelog corrective scale coverage qac/qhs  cont cons cho diet  bg goal 100-180 in hosp setting.
Fever/ Sepsis secondary to UTI   RVP negative  Lactic acid wnl  S/p Aggressive fluid resuscitation  On vanco/ Zosyn  Monitor renal function   F/u Blood and Urin cultures   ID Dr. Rodriguez was consulted by admit team, will f/u recommendations   plan d/w patient and her daughters at bedside at length
Fever/ Sepsis secondary to UTI   RVP negative  Lactic acid wnl  S/p Aggressive fluid resuscitation  On vanco/ Zosyn  Monitor renal function   F/u Blood and Urin cultures   ID Dr. Rodriguez was consulted by admit team, will f/u recommendations   plan d/w patient and her daughters at bedside at length
Fever/ Septic shock secondary to UTI   RVP negative  Lactic acid wnl  S/p Aggressive fluid resuscitation and pressor support in ICU. Now transferred to floor 09/23  S/p  vanco/ Zosyn  Monitor renal function   Blood  cultures NGTD  UC + E coli. Sensitivities pending   ID following   Renal US noted for: 1.6 cm solid left renal lesion may represent an angiomyolipoma but other   renal lesions may have this appearance. Recommend further evaluation with   dedicated pre/postcontrast renal CT scan or MRI for further evaluation. Gallstones and possible gallbladder wall calcification. Patient has no abdominal pain, n or any other GI complaint   plan d/w patient and her daughters at bedside at length
Fever/ Septic shock secondary to UTI   RVP negative  Lactic acid wnl  S/p Aggressive fluid resuscitation and pressor support in ICU. Now transferred to floor 09/23  S/p  vanco/ Zosyn  Monitor renal function   Blood  cultures NGTD  UC + E coli. Sensitivities noted. + ESBL. Started on Invanz and vanco per ID.  Monitor renal function, Vanco trough   Renal US noted for: 1.6 cm solid left renal lesion may represent an angiomyolipoma but other   renal lesions may have this appearance. Recommend further evaluation with   dedicated pre/postcontrast renal CT scan or MRI for further evaluation. Gallstones and possible gallbladder wall calcification. Patient has no abdominal pain, n/v  or any other GI complaint   MRI abdomen performed. Pending result   plan d/w patient and her daughters at bedside at length
Fever/ Septic shock secondary to UTI   S/p Aggressive fluid resuscitation and pressor support in ICU. Now transferred to floor 09/23  Blood  cultures NGTD  UC + E coli. Sensitivities noted. + ESBL and enterococcus  Ertapenem 1 g daily and augmentin 500 mg TID until 9/30 (PICC line in place)    Renal US noted for: 1.6 cm solid left renal lesion may represent an angiomyolipoma   MRI with likely angiomyolipoma
Fever/ Sepsis secondary to UTI   RVP negative  Lactic acid wnl  S/p Aggressive fluid resuscitation  On vanco/ Zosyn  Monitor renal function   F/u Blood and Urin cultures   ID Dr. Rodriguez was consulted by admit team, will f/u recommendations   plan d/w patient and her daughters at bedside at length

## 2024-09-25 NOTE — PROGRESS NOTE ADULT - PROBLEM SELECTOR PROBLEM 5
Chronic kidney disease, unspecified CKD stage
HLD (hyperlipidemia)
Chronic kidney disease, unspecified CKD stage
HLD (hyperlipidemia)
Chronic kidney disease, unspecified CKD stage
HLD (hyperlipidemia)

## 2024-09-25 NOTE — PROGRESS NOTE ADULT - PROBLEM SELECTOR PLAN 3
unsure Metformin dosage frequency?  unsure Triamterene dose-> not clear on med rec patient's daughter had at bedside pls confirm with patient's daughter or pharmacy
on lisinopril 20, Metoprolol Succ 50 mg, Triamterene, held due to shock   BP stable off meds for now
unsure Metformin dosage frequency?  unsure Triamterene dose-> not clear on med rec patient's daughter had at bedside pls confirm with patient's daughter or pharmacy
unsure Metformin dosage frequency?  unsure Triamterene dose-> not clear on med rec patient's daughter had at bedside pls confirm with patient's daughter or pharmacy
on lisinopril 20, Metoprolol Succ 50 mg, Triamterene, held due to shock     Can resume on DC with close outpatient follow up
on lisinopril 20, Metoprolol Succ 50 mg, Triamterene, held due to shock   BP stable off meds for now

## 2024-09-25 NOTE — PROGRESS NOTE ADULT - TIME BILLING
Note written by attending. Meds, labs, vitals, chart reviewed. Plan d/w daughter at bedside and patient.
Note written by attending. Meds, labs, vitals, chart reviewed. Plan d/w daughter at bedside and patient.
Plan d/w patient and primary team   We will sign off for now.   Please call us with any concerns or questions.     Arbahan Rogers MD   Division of Infectious Diseases  [facility]   Cell [phone] between 8am and 6pm   After 6pm and weekends please call ID service at [phone].
Please call us with any concerns or questions.   We will continue to follow.     Abrahan Rogers MD   Division of Infectious Diseases  [facility]   Cell [phone] between 8am and 6pm   After 6pm and weekends please call ID service at [phone].
Please call us with any concerns or questions.   We will continue to follow.     Abrahan Rogers MD   Division of Infectious Diseases  [facility]   Cell [phone] between 8am and 6pm   After 6pm and weekends please call ID service at [phone].
Note written by attending. F2F visit. Meds, labs, vitals, chart reviewed. Plan d/w patient and daughters at bedside

## 2024-09-25 NOTE — PROGRESS NOTE ADULT - PROVIDER SPECIALTY LIST ADULT
Critical Care
Endocrinology
Infectious Disease
Nephrology
Critical Care
Infectious Disease
Infectious Disease
Internal Medicine
Nephrology
Hospitalist
Infectious Disease
Infectious Disease
Nephrology
Critical Care
Critical Care
Nephrology
Nephrology
Endocrinology
Hospitalist

## 2024-09-25 NOTE — PROGRESS NOTE ADULT - REASON FOR ADMISSION
increased fatigue,  high temperature

## 2024-09-25 NOTE — PROGRESS NOTE ADULT - PROBLEM SELECTOR PROBLEM 8
Psoriasis-like skin disease
Need for prophylactic measure
Psoriasis-like skin disease
Need for prophylactic measure
Need for prophylactic measure
Psoriasis-like skin disease

## 2024-09-25 NOTE — PROGRESS NOTE ADULT - SUBJECTIVE AND OBJECTIVE BOX
[facility]                             Nephrology and Hypertension                             Christopher Cooper                                          [phone]     Patient is a 78y old  Female who presents with a chief complaint of increased fatigue,  high temperature (21 Sep 2024 00:17)       HPI:  78 F hx IDDM, HTN, HLD, presents due to generalized weakness, fever. Pt was feeling weak/tired, fell asleep in her car, in the heat for over 2 hours. Throughout day, she was unable to do her daily tasks secondary to the fatigue. 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 This level of fatigue worried family and prompted them to bring her in to ED. In ED, she was found to be febrile to 102. She states she sometimes feels fatigue after taking her home medications which include many diabetic drugs; however, she has not had any changes in medication in recent time besides a monthly injection for her psoriasis.    In ED was given one dose of ceftriaxone for a positive UA (nitrites, Too numerous bacteria to count though squamous epithelial cells were present and WBC of 25). RVP was negative. Elevated WBC count of 13.33 with Neutrophil % of 82.2. Septic LEO was done (19 Sep 2024 22:55)    Renal consulted for CKD and UTI. 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 Family at bedside      NO N/V/SOB    PAST MEDICAL & SURGICAL HISTORY:  DM (diabetes mellitus)      Benign essential HTN      H/O Bell's palsy      Gout      HLD (hyperlipidemia)      Psoriasis      No significant past surgical history           FAMILY HISTORY:  NC    Social History:Non smoker    MEDICATIONS  (STANDING):  aspirin enteric coated 81 milliGRAM(s) Oral daily  atorvastatin 40 milliGRAM(s) Oral at bedtime  dextrose 5%. 1000 milliLiter(s) (50 mL/Hr) IV Continuous <Continuous>  dextrose 5%. 1000 milliLiter(s) (100 mL/Hr) IV Continuous <Continuous>  dextrose 50% Injectable 25 Gram(s) IV Push once  dextrose 50% Injectable 25 Gram(s) IV Push once  dextrose 50% Injectable 12.5 Gram(s) IV Push once  glucagon  Injectable 1 milliGRAM(s) IntraMuscular once  heparin   Injectable 5000 Unit(s) SubCutaneous every 12 hours  insulin glargine Injectable (LANTUS) 12 Unit(s) SubCutaneous every morning  insulin lispro (ADMELOG) corrective regimen sliding scale   SubCutaneous at bedtime  insulin lispro (ADMELOG) corrective regimen sliding scale   SubCutaneous three times a day before meals  magnesium sulfate  IVPB 2 Gram(s) IV Intermittent once  midodrine. 10 milliGRAM(s) Oral three times a day  multivitamin/minerals 1 Tablet(s) Oral daily  norepinephrine Infusion 0.05 MICROgram(s)/kG/Min (8.25 mL/Hr) IV Continuous <Continuous>  piperacillin/tazobactam IVPB.. 3.375 Gram(s) IV Intermittent every 8 hours  potassium chloride    Tablet ER 40 milliEquivalent(s) Oral once  pregabalin 75 milliGRAM(s) Oral every 12 hours  sodium chloride 0.9%. 1000 milliLiter(s) (100 mL/Hr) IV Continuous <Continuous>  vancomycin  IVPB 1250 milliGRAM(s) IV Intermittent every 24 hours    MEDICATIONS  (PRN):  acetaminophen     Tablet .. 650 milliGRAM(s) Oral every 6 hours PRN Temp greater or equal to 38C (100.4F), Mild Pain (1 - 3)  dextrose Oral Gel 15 Gram(s) Oral once PRN Blood Glucose LESS THAN 70 milliGRAM(s)/deciliter  melatonin 3 milliGRAM(s) Oral at bedtime PRN Insomnia   Meds reviewed    Allergies    No Known Allergies    Intolerances         REVIEW OF SYSTEMS: As per HPI, otherwise negative    Vital Signs Last 24 Hrs  T(C): 36.8 (25 Sep 2024 05:11), Max: 37 (24 Sep 2024 20:43)  T(F): 98.3 (25 Sep 2024 05:11), Max: 98.6 (24 Sep 2024 20:43)  HR: 82 (25 Sep 2024 05:11) (60 - 82)  BP: 112/67 (25 Sep 2024 05:11) (112/67 - 169/80)  BP(mean): --  RR: 18 (25 Sep 2024 05:11) (18 - 20)  SpO2: 94% (25 Sep 2024 05:11) (91% - 94%)    Parameters below as of 25 Sep 2024 05:11  Patient On (Oxygen Delivery Method): room air      PHYSICAL EXAM:    GENERAL: NAD  HEAD:  Atraumatic, Normocephalic  EYES: EOMI, conjunctiva and sclera clear  ENMT: No Drainage from nares, No drainage from ears  NECK: Supple, neck  veins full  NERVOUS SYSTEM:  Awake and Alert  EXTREMITIES:  No Edema  SKIN: No rashes No obvious ecchymosis      LABS:                                     9.1    6.73  )-----------( 199      ( 25 Sep 2024 05:32 )             28.7     09-25    140  |  107  |  13  ----------------------------<  193[H]  3.8   |  27  |  0.89    Ca    9.4      25 Sep 2024 05:32    TPro  6.6  /  Alb  3.0[L]  /  TBili  0.4  /  DBili  x   /  AST  12[L]  /  ALT  25  /  AlkPhos  103  09-24      Urinalysis Basic - ( 25 Sep 2024 05:32 )    Color: x / Appearance: x / SG: x / pH: x  Gluc: 193 mg/dL / Ketone: x  / Bili: x / Urobili: x   Blood: x / Protein: x / Nitrite: x   Leuk Esterase: x / RBC: x / WBC x   Sq Epi: x / Non Sq Epi: x / Bacteria: x

## 2024-09-25 NOTE — PROGRESS NOTE ADULT - PROBLEM SELECTOR PLAN 5
No previous records seen on [facility]  daughter states Cr is at her baseline, but GFR is lower than usual at 39    plan  obtain outpatient records  trend Cr/ GFR  if abnormal from baseline consider Nephro consult while inpatient
continue atorvastatin 40  on vascepa 1 gram daily-> nonformulary can ask patient's family to bring in
No previous records seen on [facility]  daughter states Cr is at her baseline, but GFR is lower than usual at 39    plan  obtain outpatient records  trend Cr/ GFR  if abnormal from baseline consider Nephro consult while inpatient
continue atorvastatin 40  on vascepa 1 gram daily-> nonformulary can ask patient's family to bring in
continue atorvastatin 40  on vascepa 1 gram daily-> nonformulary can ask patient's family to bring in
No previous records seen on [facility]  daughter states Cr is at her baseline, but GFR is lower than usual at 39    plan  obtain outpatient records  trend Cr/ GFR  if abnormal from baseline consider Nephro consult while inpatient

## 2024-09-25 NOTE — PROGRESS NOTE ADULT - PROBLEM SELECTOR PROBLEM 3
Medication management
Benign essential HTN
Benign essential HTN
Medication management
Benign essential HTN
Medication management

## 2024-09-25 NOTE — PROGRESS NOTE ADULT - PROBLEM SELECTOR PLAN 2
follows with Dr. Sheryl Wyatt  on Mounjaro 10 mg weekly  on Jardiance 25 mg daily  on Metformin HCL  mg 4tablets in PM?  on Glimepiride 2 mg daily  on Toujeo 12 units daily  states last A1C was in the 8's range    plan  hold oral diabetic regimen  start long acting glargine 8 units + Low dose ISS  a1c   Endocrine consult
follows with Dr. Sheryl Wyatt  on Mounjaro 10 mg weekly  Stop Jardiance  on Metformin HCL  mg 4tablets in PM?  Stop Glimepiride 2 mg daily  on Toujeo 12 units daily  states last A1C was in the 8's range (here 7.5)    Needs to follow up outpatient
follows with Dr. Sheryl Wyatt  on Mounjaro 10 mg weekly  on Jardiance 25 mg daily  on Metformin HCL  mg 4tablets in PM?  on Glimepiride 2 mg daily  on Toujeo 12 units daily  states last A1C was in the 8's range    plan  hold oral diabetic regimen  c/w long acting glargine 8 units + Low dose ISS  a1c   Endocrine consult
follows with Dr. Sheryl Wyatt  on Mounjaro 10 mg weekly  on Jardiance 25 mg daily  on Metformin HCL  mg 4tablets in PM?  on Glimepiride 2 mg daily  on Toujeo 12 units daily  states last A1C was in the 8's range    plan  hold oral diabetic regimen  start long acting glargine 8 units + Low dose ISS  a1c   Endocrine consult
follows with Dr. Sheryl Wyatt  on Mounjaro 10 mg weekly  on Jardiance 25 mg daily  on Metformin HCL  mg 4tablets in PM?  on Glimepiride 2 mg daily  on Toujeo 12 units daily  states last A1C was in the 8's range    plan  hold oral diabetic regimen  c/w long acting glargine 8 units + Low dose ISS  a1c   Endocrine consult
follows with Dr. Sheryl Wyatt  on Mounjaro 10 mg weekly  on Jardiance 25 mg daily  on Metformin HCL  mg 4tablets in PM?  on Glimepiride 2 mg daily  on Toujeo 12 units daily  states last A1C was in the 8's range    plan  hold oral diabetic regimen  c/w long acting glargine 8 units + Low dose ISS  a1c   Endocrine consult

## 2024-09-25 NOTE — PROGRESS NOTE ADULT - ASSESSMENT
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 No indication for kidney biopsy. S/p ELDER  -S/p IVF bolus. S/p IVF NS  -ABX renal dose. Renal wise tolerating. 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

## 2024-09-25 NOTE — DISCHARGE NOTE NURSING/CASE MANAGEMENT/SOCIAL WORK - PATIENT PORTAL LINK FT
You can access the FollowMyHealth Patient Portal offered by [facility] [place] by registering at the following website: [email or web address]. By joining [facility]’s FollowMyHealth portal, you will also be able to view your health information using other applications (apps) compatible with our system.

## 2024-09-25 NOTE — PROGRESS NOTE ADULT - PROBLEM SELECTOR PROBLEM 1
Type 2 diabetes mellitus with hyperglycemia
Type 2 diabetes mellitus with hyperglycemia
Sepsis secondary to UTI

## 2024-09-25 NOTE — CASE MANAGEMENT PROGRESS NOTE - NSCMPROGRESSNOTE_GEN_ALL_CORE
Per MD pt is medically cleared for discharge today 9/25/24. CM met with patient and daughter Venecia to discuss transition of care. Pt gave verbal consent to speak with daughter at bedside. Pt is to be transitioned to home with [facility] [phone] / fax [phone]. Pt is to be transitioned to home with [facility] [phone]/ fax [phone]. CM explained home care expectations, process, insurance provisions and home safety with pt verbalizing understanding.  Pt states she has 6 daughters and family will assist post discharge. CM confirmed with home care agency, pt case is being accepted and home care was made aware of DC plan today 9/25/24 with SOC 9/26/24. Pt and family aware of plan and in agreement / verbalize understanding. IMM discussed / given. Pt verbalized understanding. Confirmed pharmacy is [facility]. community MD is Dr. Argueta. Pt stated they would make their own follow up appointments. DMEs in home include walker and cane. Pt stated her daughter will transport pt home. Pt / daughter stated RN demonstrated technique of IV antibiotic administration, pt / daughter verbalized understanding of procedures.  CM discussed plan with MD and RN. CM to remain available through hospitalization.

## 2024-09-25 NOTE — DISCHARGE NOTE NURSING/CASE MANAGEMENT/SOCIAL WORK - NSDCPEFALRISK_GEN_ALL_CORE
For information on Fall & Injury Prevention, visit: [email or web address] OR  [email or web address] OR  https://www.cdc.gov/steadi/patient.html

## 2024-09-25 NOTE — PROGRESS NOTE ADULT - PROBLEM SELECTOR PROBLEM 4
Chronic kidney disease, unspecified CKD stage
Chronic kidney disease, unspecified CKD stage
Benign essential HTN
Chronic kidney disease, unspecified CKD stage
Benign essential HTN
Benign essential HTN

## 2024-10-02 RX ORDER — INSULIN GLARGINE 300 U/ML
12 INJECTION, SOLUTION SUBCUTANEOUS
Refills: 0 | DISCHARGE

## 2024-10-02 RX ORDER — GLIMEPIRIDE 1 MG/1
1 TABLET ORAL
Refills: 0 | DISCHARGE

## 2024-10-02 RX ORDER — PREGABALIN 25 MG/1
1 CAPSULE ORAL
Refills: 0 | DISCHARGE

## 2024-10-02 RX ORDER — ATORVASTATIN CALCIUM 10 MG/1
1 TABLET, FILM COATED ORAL
Refills: 0 | DISCHARGE

## 2024-10-02 RX ORDER — TRIAMTERENE AND HYDROCHLOROTHIAZIDE 75; 50 MG/1; MG/1
1 TABLET ORAL
Refills: 0 | DISCHARGE

## 2024-10-02 RX ORDER — PYRIDOXINE HCL (VITAMIN B6) 50 MG
1 TABLET ORAL
Refills: 0 | DISCHARGE

## 2024-10-02 RX ORDER — TIRZEPATIDE 12.5 MG/.5ML
10 INJECTION, SOLUTION SUBCUTANEOUS
Refills: 0 | DISCHARGE

## 2024-10-02 RX ORDER — ICOSAPENT ETHYL 1 G/1
2 CAPSULE ORAL
Refills: 0 | DISCHARGE

## 2024-10-02 RX ORDER — METFORMIN HCL 500 MG
1 TABLET ORAL
Refills: 0 | DISCHARGE

## 2024-10-02 RX ORDER — FEBUXOSTAT 40 MG/1
1 TABLET, COATED ORAL
Refills: 0 | DISCHARGE

## 2024-10-02 RX ORDER — EMPAGLIFLOZIN 25 MG/1
1 TABLET, FILM COATED ORAL
Refills: 0 | DISCHARGE

## 2024-10-02 RX ORDER — ASPIRIN 325 MG
1 TABLET ORAL
Refills: 0 | DISCHARGE

## 2024-10-02 RX ORDER — METOPROLOL TARTRATE 50 MG
1 TABLET ORAL
Refills: 0 | DISCHARGE

## 2024-10-04 ENCOUNTER — APPOINTMENT (OUTPATIENT)
Dept: WOUND CARE | Facility: HOSPITAL | Age: 79
End: 2024-10-04
Payer: MEDICARE

## 2024-10-04 ENCOUNTER — OUTPATIENT (OUTPATIENT)
Dept: OUTPATIENT SERVICES | Facility: HOSPITAL | Age: 79
LOS: 1 days | Discharge: HOME | End: 2024-10-04
Payer: MEDICARE

## 2024-10-04 VITALS
BODY MASS INDEX: 34.02 KG/M2 | HEIGHT: 63 IN | HEART RATE: 69 BPM | DIASTOLIC BLOOD PRESSURE: 88 MMHG | OXYGEN SATURATION: 96 % | RESPIRATION RATE: 14 BRPM | WEIGHT: 192 LBS | TEMPERATURE: 97.9 F | SYSTOLIC BLOOD PRESSURE: 150 MMHG

## 2024-10-04 DIAGNOSIS — E11.622 TYPE 2 DIABETES MELLITUS WITH OTHER SKIN ULCER: ICD-10-CM

## 2024-10-04 DIAGNOSIS — L40.9 PSORIASIS, UNSPECIFIED: ICD-10-CM

## 2024-10-04 DIAGNOSIS — G62.9 POLYNEUROPATHY, UNSPECIFIED: ICD-10-CM

## 2024-10-04 DIAGNOSIS — S91.009A UNSPECIFIED OPEN WOUND, UNSPECIFIED ANKLE, INITIAL ENCOUNTER: ICD-10-CM

## 2024-10-04 DIAGNOSIS — E11.9 TYPE 2 DIABETES MELLITUS W/OUT COMPLICATIONS: ICD-10-CM

## 2024-10-04 DIAGNOSIS — L97.322 NON-PRESSURE CHRONIC ULCER OF LEFT ANKLE WITH FAT LAYER EXPOSED: ICD-10-CM

## 2024-10-04 DIAGNOSIS — E11.40 TYPE 2 DIABETES MELLITUS WITH DIABETIC NEUROPATHY, UNSPECIFIED: ICD-10-CM

## 2024-10-04 DIAGNOSIS — Z78.9 OTHER SPECIFIED HEALTH STATUS: ICD-10-CM

## 2024-10-04 DIAGNOSIS — I10 ESSENTIAL (PRIMARY) HYPERTENSION: ICD-10-CM

## 2024-10-04 DIAGNOSIS — L97.801 NON-PRESSURE CHRONIC ULCER OF OTHER PART OF UNSPECIFIED LOWER LEG LIMITED TO BREAKDOWN OF SKIN: ICD-10-CM

## 2024-10-04 PROBLEM — Z86.69 PERSONAL HISTORY OF OTHER DISEASES OF THE NERVOUS SYSTEM AND SENSE ORGANS: Chronic | Status: ACTIVE | Noted: 2024-09-20

## 2024-10-04 PROBLEM — M10.9 GOUT, UNSPECIFIED: Chronic | Status: ACTIVE | Noted: 2024-09-20

## 2024-10-04 PROBLEM — E78.5 HYPERLIPIDEMIA, UNSPECIFIED: Chronic | Status: ACTIVE | Noted: 2024-09-20

## 2024-10-04 PROCEDURE — 99203 OFFICE O/P NEW LOW 30 MIN: CPT

## 2024-10-04 RX ORDER — PSYLLIUM HUSK 3.4 G/12G
POWDER, FOR SUSPENSION ORAL
Refills: 0 | Status: ACTIVE | COMMUNITY

## 2024-10-04 RX ORDER — MULTIVIT-MIN/FOLIC/VIT K/LYCOP 400-300MCG
25 MCG TABLET ORAL
Refills: 0 | Status: ACTIVE | COMMUNITY

## 2024-10-04 RX ORDER — TIRZEPATIDE 10 MG/.5ML
10 INJECTION, SOLUTION SUBCUTANEOUS
Refills: 0 | Status: ACTIVE | COMMUNITY

## 2024-10-04 RX ORDER — PREGABALIN 75 MG/1
75 CAPSULE ORAL
Refills: 0 | Status: ACTIVE | COMMUNITY

## 2024-10-04 RX ORDER — LISINOPRIL 20 MG/1
20 TABLET ORAL
Refills: 0 | Status: ACTIVE | COMMUNITY

## 2024-10-04 RX ORDER — METFORMIN HYDROCHLORIDE 500 MG/1
500 TABLET, COATED ORAL
Refills: 0 | Status: ACTIVE | COMMUNITY

## 2024-10-04 RX ORDER — BIOTIN 10000 MCG
10 CAPSULE ORAL
Refills: 0 | Status: ACTIVE | COMMUNITY

## 2024-10-04 RX ORDER — METOPROLOL TARTRATE 50 MG/1
50 TABLET ORAL
Refills: 0 | Status: ACTIVE | COMMUNITY

## 2024-10-04 RX ORDER — ASPIRIN 325 MG
325 TABLET ORAL
Refills: 0 | Status: ACTIVE | COMMUNITY

## 2024-10-04 NOTE — REASON FOR VISIT
[Consultation] : a consultation visit [Family Member] : family member [FreeTextEntry1] : Initial Visit

## 2024-10-04 NOTE — PHYSICAL EXAM
[2 x 2] : 2 x 2  [2+] : left 2+ [Skin Ulcer] : ulcer [Ankle Swelling (On Exam)] : not present [Varicose Veins Of Lower Extremities] : not present [] : not present [de-identified] : A&Ox3, NAD [de-identified] : 5/5 strength in all quadrants bilaterally [de-identified] : left ankle wound down to skin, subcutaneous tissue, fat [de-identified] : diminished light touch sensation bilaterally [FreeTextEntry1] : Left Medial Ankle [FreeTextEntry2] : 2.0 [FreeTextEntry3] : 4.0 [FreeTextEntry4] : 0.1 [de-identified] : Serosanguinous [de-identified] : 25% [de-identified] : Medihoney [de-identified] :  Mechanically cleansed with NS 0.9%, Sterile Gauze Cloth Tape    Dorsalis Pedis: R Palpable, Regular, 2+ L Palpable, Regular, 2+ Posterior Tibialis: R Palpable, Regular, 2+ L Palpable, Regular, 2+ Extremity Color: Pigmented Extremity Temperature: Warm Capillary Refill: < 3 seconds bilaterally   [TWNoteComboBox4] : Small [TWNoteComboBox5] : No [TWNoteComboBox6] : Diabetic [de-identified] : Normal [de-identified] : No [de-identified] : None [de-identified] : None [de-identified] : >75% [de-identified] : Yes [de-identified] : 3x Weekly [de-identified] : Primary Dressing

## 2024-10-04 NOTE — PLAN
[FreeTextEntry1] : Patient examined and evaluated at this time. Continue local wound care and offloading. Will auth for vascular studies. Referral to vascular surgery for vascular evaluation. Spent 30 minutes for patient care and medical decision making. Patient to follow up in 1 week.

## 2024-10-04 NOTE — ASSESSMENT
[Verbal] : Verbal [Written] : Written [Demo] : Demo [Patient] : Patient [Good - alert, interested, motivated] : Good - alert, interested, motivated [Verbalizes knowledge/Understanding] : Verbalizes knowledge/understanding [Dressing changes] : dressing changes [Skin Care] : skin care [Signs and symptoms of infection] : sign and symptoms of infection [Nutrition] : nutrition [How and When to Call] : how and when to call [Pain Management] : pain management [Patient responsibility to plan of care] : patient responsibility to plan of care [Glycemic Control] : glycemic control [] : Yes [Stable] : stable [Home] : Home [Ambulatory] : Ambulatory [Not Applicable - Long Term Care/Home Health Agency] : Long Term Care/Home Health Agency: Not Applicable [FreeTextEntry2] : Infection prevention wound care Maintain optimal Skin Integrity to high pressure areas. Nutrition and Wound Healing Elevation and low sodium compliance Pressure relief/Pressure redistribution    [FreeTextEntry3] : Initial Visit [FreeTextEntry4] : Vascular studies submitted Patient's daughter will be performing dressing changes, small amount of supplies provided. She will order the medihoney from [facility] Follow Up in 1 week

## 2024-10-04 NOTE — HISTORY OF PRESENT ILLNESS
[FreeTextEntry1] : Patient had a blister to the left ankle that popped about 3 weeks ago and has since gotten worse. Pt was in [facility] hospital unrelated to the wound. Patient accompanied by family member.

## 2024-10-11 ENCOUNTER — OUTPATIENT (OUTPATIENT)
Dept: OUTPATIENT SERVICES | Facility: HOSPITAL | Age: 79
LOS: 1 days | Discharge: HOME | End: 2024-10-11
Payer: MEDICARE

## 2024-10-11 ENCOUNTER — APPOINTMENT (OUTPATIENT)
Dept: WOUND CARE | Facility: HOSPITAL | Age: 79
End: 2024-10-11
Payer: MEDICARE

## 2024-10-11 VITALS
TEMPERATURE: 98 F | HEIGHT: 63 IN | SYSTOLIC BLOOD PRESSURE: 160 MMHG | WEIGHT: 192 LBS | OXYGEN SATURATION: 98 % | HEART RATE: 94 BPM | RESPIRATION RATE: 16 BRPM | DIASTOLIC BLOOD PRESSURE: 79 MMHG | BODY MASS INDEX: 34.02 KG/M2

## 2024-10-11 DIAGNOSIS — E11.40 TYPE 2 DIABETES MELLITUS WITH DIABETIC NEUROPATHY, UNSPECIFIED: ICD-10-CM

## 2024-10-11 DIAGNOSIS — L97.322 NON-PRESSURE CHRONIC ULCER OF LEFT ANKLE WITH FAT LAYER EXPOSED: ICD-10-CM

## 2024-10-11 DIAGNOSIS — E11.622 TYPE 2 DIABETES MELLITUS WITH OTHER SKIN ULCER: ICD-10-CM

## 2024-10-11 DIAGNOSIS — L97.801 NON-PRESSURE CHRONIC ULCER OF OTHER PART OF UNSPECIFIED LOWER LEG LIMITED TO BREAKDOWN OF SKIN: ICD-10-CM

## 2024-10-11 DIAGNOSIS — I10 ESSENTIAL (PRIMARY) HYPERTENSION: ICD-10-CM

## 2024-10-11 PROCEDURE — 99213 OFFICE O/P EST LOW 20 MIN: CPT

## 2024-10-14 ENCOUNTER — OUTPATIENT (OUTPATIENT)
Dept: OUTPATIENT SERVICES | Facility: HOSPITAL | Age: 79
LOS: 1 days | End: 2024-10-14
Payer: MEDICARE
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
## 2024-10-14 DIAGNOSIS — S91.009A UNSPECIFIED OPEN WOUND, UNSPECIFIED ANKLE, INITIAL ENCOUNTER: ICD-10-CM

## 2024-10-14 PROCEDURE — 93923 UPR/LXTR ART STDY 3+ LVLS: CPT | Mod: 26

## 2024-10-16 ENCOUNTER — APPOINTMENT (OUTPATIENT)
Dept: WOUND CARE | Facility: HOSPITAL | Age: 79
End: 2024-10-16
Payer: MEDICARE

## 2024-10-16 ENCOUNTER — OUTPATIENT (OUTPATIENT)
Dept: OUTPATIENT SERVICES | Facility: HOSPITAL | Age: 79
LOS: 1 days | Discharge: HOME | End: 2024-10-16
Payer: MEDICARE

## 2024-10-16 VITALS
DIASTOLIC BLOOD PRESSURE: 88 MMHG | BODY MASS INDEX: 34.02 KG/M2 | HEIGHT: 63 IN | WEIGHT: 192 LBS | HEART RATE: 92 BPM | RESPIRATION RATE: 17 BRPM | TEMPERATURE: 98.5 F | OXYGEN SATURATION: 91 % | SYSTOLIC BLOOD PRESSURE: 172 MMHG

## 2024-10-16 DIAGNOSIS — E11.622 TYPE 2 DIABETES MELLITUS WITH OTHER SKIN ULCER: ICD-10-CM

## 2024-10-16 DIAGNOSIS — L97.801 NON-PRESSURE CHRONIC ULCER OF OTHER PART OF UNSPECIFIED LOWER LEG LIMITED TO BREAKDOWN OF SKIN: ICD-10-CM

## 2024-10-16 DIAGNOSIS — L97.522 NON-PRESSURE CHRONIC ULCER OF OTHER PART OF LEFT FOOT WITH FAT LAYER EXPOSED: ICD-10-CM

## 2024-10-16 DIAGNOSIS — L97.322 NON-PRESSURE CHRONIC ULCER OF LEFT ANKLE WITH FAT LAYER EXPOSED: ICD-10-CM

## 2024-10-16 DIAGNOSIS — I10 ESSENTIAL (PRIMARY) HYPERTENSION: ICD-10-CM

## 2024-10-16 DIAGNOSIS — E11.40 TYPE 2 DIABETES MELLITUS WITH DIABETIC NEUROPATHY, UNSPECIFIED: ICD-10-CM

## 2024-10-16 PROCEDURE — 99203 OFFICE O/P NEW LOW 30 MIN: CPT

## 2024-10-29 ENCOUNTER — OUTPATIENT (OUTPATIENT)
Dept: OUTPATIENT SERVICES | Facility: HOSPITAL | Age: 79
LOS: 1 days | Discharge: HOME | End: 2024-10-29
Payer: MEDICARE

## 2024-10-29 ENCOUNTER — APPOINTMENT (OUTPATIENT)
Dept: WOUND CARE | Facility: HOSPITAL | Age: 79
End: 2024-10-29
Payer: MEDICARE

## 2024-10-29 VITALS
DIASTOLIC BLOOD PRESSURE: 69 MMHG | WEIGHT: 192 LBS | BODY MASS INDEX: 26.01 KG/M2 | SYSTOLIC BLOOD PRESSURE: 139 MMHG | HEART RATE: 79 BPM | HEIGHT: 72 IN | TEMPERATURE: 99.3 F | RESPIRATION RATE: 18 BRPM | OXYGEN SATURATION: 96 %

## 2024-10-29 DIAGNOSIS — E11.40 TYPE 2 DIABETES MELLITUS WITH DIABETIC NEUROPATHY, UNSPECIFIED: ICD-10-CM

## 2024-10-29 DIAGNOSIS — I10 ESSENTIAL (PRIMARY) HYPERTENSION: ICD-10-CM

## 2024-10-29 DIAGNOSIS — L97.322 NON-PRESSURE CHRONIC ULCER OF LEFT ANKLE WITH FAT LAYER EXPOSED: ICD-10-CM

## 2024-10-29 DIAGNOSIS — E11.622 TYPE 2 DIABETES MELLITUS WITH OTHER SKIN ULCER: ICD-10-CM

## 2024-10-29 PROCEDURE — 99203 OFFICE O/P NEW LOW 30 MIN: CPT

## 2024-11-08 ENCOUNTER — OUTPATIENT (OUTPATIENT)
Dept: OUTPATIENT SERVICES | Facility: HOSPITAL | Age: 79
LOS: 1 days | Discharge: HOME | End: 2024-11-08
Payer: MEDICARE

## 2024-11-08 ENCOUNTER — APPOINTMENT (OUTPATIENT)
Dept: WOUND CARE | Facility: HOSPITAL | Age: 79
End: 2024-11-08
Payer: MEDICARE

## 2024-11-08 VITALS
SYSTOLIC BLOOD PRESSURE: 149 MMHG | BODY MASS INDEX: 34.02 KG/M2 | HEIGHT: 63 IN | WEIGHT: 192 LBS | RESPIRATION RATE: 18 BRPM | DIASTOLIC BLOOD PRESSURE: 89 MMHG | OXYGEN SATURATION: 96 % | HEART RATE: 82 BPM | TEMPERATURE: 97.9 F

## 2024-11-08 PROCEDURE — 88304 TISSUE EXAM BY PATHOLOGIST: CPT | Mod: 26

## 2024-11-08 PROCEDURE — 11042 DBRDMT SUBQ TIS 1ST 20SQCM/<: CPT

## 2024-11-10 DIAGNOSIS — L97.322 NON-PRESSURE CHRONIC ULCER OF LEFT ANKLE WITH FAT LAYER EXPOSED: ICD-10-CM

## 2024-11-10 DIAGNOSIS — I10 ESSENTIAL (PRIMARY) HYPERTENSION: ICD-10-CM

## 2024-11-10 DIAGNOSIS — E11.40 TYPE 2 DIABETES MELLITUS WITH DIABETIC NEUROPATHY, UNSPECIFIED: ICD-10-CM

## 2024-11-10 DIAGNOSIS — E11.622 TYPE 2 DIABETES MELLITUS WITH OTHER SKIN ULCER: ICD-10-CM

## 2024-11-15 ENCOUNTER — OUTPATIENT (OUTPATIENT)
Dept: OUTPATIENT SERVICES | Facility: HOSPITAL | Age: 79
LOS: 1 days | Discharge: HOME | End: 2024-11-15
Payer: MEDICARE

## 2024-11-15 ENCOUNTER — APPOINTMENT (OUTPATIENT)
Dept: WOUND CARE | Facility: HOSPITAL | Age: 79
End: 2024-11-15
Payer: MEDICARE

## 2024-11-15 VITALS
TEMPERATURE: 98.3 F | HEART RATE: 74 BPM | BODY MASS INDEX: 34.02 KG/M2 | SYSTOLIC BLOOD PRESSURE: 147 MMHG | DIASTOLIC BLOOD PRESSURE: 76 MMHG | WEIGHT: 192 LBS | OXYGEN SATURATION: 97 % | HEIGHT: 63 IN | RESPIRATION RATE: 18 BRPM

## 2024-11-15 DIAGNOSIS — I10 ESSENTIAL (PRIMARY) HYPERTENSION: ICD-10-CM

## 2024-11-15 DIAGNOSIS — L97.322 NON-PRESSURE CHRONIC ULCER OF LEFT ANKLE WITH FAT LAYER EXPOSED: ICD-10-CM

## 2024-11-15 DIAGNOSIS — E11.622 TYPE 2 DIABETES MELLITUS WITH OTHER SKIN ULCER: ICD-10-CM

## 2024-11-15 DIAGNOSIS — E11.40 TYPE 2 DIABETES MELLITUS WITH DIABETIC NEUROPATHY, UNSPECIFIED: ICD-10-CM

## 2024-11-15 DIAGNOSIS — L97.801 NON-PRESSURE CHRONIC ULCER OF OTHER PART OF UNSPECIFIED LOWER LEG LIMITED TO BREAKDOWN OF SKIN: ICD-10-CM

## 2024-11-15 PROCEDURE — 99213 OFFICE O/P EST LOW 20 MIN: CPT

## 2024-11-15 PROCEDURE — G0463: CPT

## 2024-11-20 PROCEDURE — 11042 DBRDMT SUBQ TIS 1ST 20SQCM/<: CPT

## 2024-11-20 PROCEDURE — G0463: CPT

## 2024-11-20 PROCEDURE — 88304 TISSUE EXAM BY PATHOLOGIST: CPT

## 2024-11-20 PROCEDURE — 93923 UPR/LXTR ART STDY 3+ LVLS: CPT

## 2024-11-26 ENCOUNTER — APPOINTMENT (OUTPATIENT)
Dept: WOUND CARE | Facility: HOSPITAL | Age: 79
End: 2024-11-26
Payer: MEDICARE

## 2024-11-26 ENCOUNTER — OUTPATIENT (OUTPATIENT)
Dept: OUTPATIENT SERVICES | Facility: HOSPITAL | Age: 79
LOS: 1 days | Discharge: HOME | End: 2024-11-26
Payer: MEDICARE

## 2024-11-26 VITALS
WEIGHT: 192 LBS | DIASTOLIC BLOOD PRESSURE: 81 MMHG | OXYGEN SATURATION: 98 % | HEART RATE: 79 BPM | HEIGHT: 63 IN | RESPIRATION RATE: 18 BRPM | SYSTOLIC BLOOD PRESSURE: 147 MMHG | TEMPERATURE: 99.3 F | BODY MASS INDEX: 34.02 KG/M2

## 2024-11-26 DIAGNOSIS — E11.622 TYPE 2 DIABETES MELLITUS WITH OTHER SKIN ULCER: ICD-10-CM

## 2024-11-26 DIAGNOSIS — L97.322 NON-PRESSURE CHRONIC ULCER OF LEFT ANKLE WITH FAT LAYER EXPOSED: ICD-10-CM

## 2024-11-26 DIAGNOSIS — I10 ESSENTIAL (PRIMARY) HYPERTENSION: ICD-10-CM

## 2024-11-26 DIAGNOSIS — L40.9 PSORIASIS, UNSPECIFIED: ICD-10-CM

## 2024-11-26 DIAGNOSIS — E11.40 TYPE 2 DIABETES MELLITUS WITH DIABETIC NEUROPATHY, UNSPECIFIED: ICD-10-CM

## 2024-11-26 PROCEDURE — 99213 OFFICE O/P EST LOW 20 MIN: CPT

## 2024-11-26 PROCEDURE — G0463: CPT

## 2024-12-07 ENCOUNTER — OUTPATIENT (OUTPATIENT)
Dept: OUTPATIENT SERVICES | Facility: HOSPITAL | Age: 79
LOS: 1 days | Discharge: HOME | End: 2024-12-07
Payer: MEDICARE

## 2024-12-07 ENCOUNTER — APPOINTMENT (OUTPATIENT)
Dept: WOUND CARE | Facility: HOSPITAL | Age: 79
End: 2024-12-07

## 2024-12-07 VITALS
OXYGEN SATURATION: 92 % | BODY MASS INDEX: 34.02 KG/M2 | WEIGHT: 192 LBS | SYSTOLIC BLOOD PRESSURE: 189 MMHG | DIASTOLIC BLOOD PRESSURE: 81 MMHG | RESPIRATION RATE: 22 BRPM | TEMPERATURE: 98 F | HEART RATE: 77 BPM | HEIGHT: 63 IN

## 2024-12-07 DIAGNOSIS — E11.622 TYPE 2 DIABETES MELLITUS WITH OTHER SKIN ULCER: ICD-10-CM

## 2024-12-07 DIAGNOSIS — L97.801 NON-PRESSURE CHRONIC ULCER OF OTHER PART OF UNSPECIFIED LOWER LEG LIMITED TO BREAKDOWN OF SKIN: ICD-10-CM

## 2024-12-07 DIAGNOSIS — L97.322 NON-PRESSURE CHRONIC ULCER OF LEFT ANKLE WITH FAT LAYER EXPOSED: ICD-10-CM

## 2024-12-07 PROCEDURE — 99213 OFFICE O/P EST LOW 20 MIN: CPT

## 2024-12-07 PROCEDURE — G0463: CPT

## 2024-12-11 DIAGNOSIS — E11.622 TYPE 2 DIABETES MELLITUS WITH OTHER SKIN ULCER: ICD-10-CM

## 2024-12-11 DIAGNOSIS — I10 ESSENTIAL (PRIMARY) HYPERTENSION: ICD-10-CM

## 2024-12-11 DIAGNOSIS — L40.9 PSORIASIS, UNSPECIFIED: ICD-10-CM

## 2024-12-11 DIAGNOSIS — E11.40 TYPE 2 DIABETES MELLITUS WITH DIABETIC NEUROPATHY, UNSPECIFIED: ICD-10-CM

## 2024-12-11 DIAGNOSIS — L97.322 NON-PRESSURE CHRONIC ULCER OF LEFT ANKLE WITH FAT LAYER EXPOSED: ICD-10-CM

## 2024-12-20 ENCOUNTER — APPOINTMENT (OUTPATIENT)
Dept: WOUND CARE | Facility: HOSPITAL | Age: 79
End: 2024-12-20
Payer: MEDICARE

## 2024-12-20 ENCOUNTER — OUTPATIENT (OUTPATIENT)
Dept: OUTPATIENT SERVICES | Facility: HOSPITAL | Age: 79
LOS: 1 days | Discharge: HOME | End: 2024-12-20
Payer: MEDICARE

## 2024-12-20 DIAGNOSIS — L97.801 NON-PRESSURE CHRONIC ULCER OF OTHER PART OF UNSPECIFIED LOWER LEG LIMITED TO BREAKDOWN OF SKIN: ICD-10-CM

## 2024-12-20 DIAGNOSIS — E11.622 TYPE 2 DIABETES MELLITUS WITH OTHER SKIN ULCER: ICD-10-CM

## 2024-12-20 DIAGNOSIS — L97.322 NON-PRESSURE CHRONIC ULCER OF LEFT ANKLE WITH FAT LAYER EXPOSED: ICD-10-CM

## 2024-12-20 DIAGNOSIS — L40.9 PSORIASIS, UNSPECIFIED: ICD-10-CM

## 2024-12-20 DIAGNOSIS — E11.40 TYPE 2 DIABETES MELLITUS WITH DIABETIC NEUROPATHY, UNSPECIFIED: ICD-10-CM

## 2024-12-20 DIAGNOSIS — I01.0: ICD-10-CM

## 2024-12-20 PROCEDURE — G0463: CPT

## 2024-12-20 PROCEDURE — 99213 OFFICE O/P EST LOW 20 MIN: CPT

## 2025-01-03 ENCOUNTER — OUTPATIENT (OUTPATIENT)
Dept: OUTPATIENT SERVICES | Facility: HOSPITAL | Age: 80
LOS: 1 days | Discharge: HOME | End: 2025-01-03
Payer: MEDICARE

## 2025-01-03 ENCOUNTER — APPOINTMENT (OUTPATIENT)
Dept: WOUND CARE | Facility: HOSPITAL | Age: 80
End: 2025-01-03
Payer: MEDICARE

## 2025-01-03 VITALS
WEIGHT: 192 LBS | DIASTOLIC BLOOD PRESSURE: 102 MMHG | HEART RATE: 83 BPM | BODY MASS INDEX: 34.02 KG/M2 | RESPIRATION RATE: 18 BRPM | TEMPERATURE: 99.2 F | HEIGHT: 63 IN | OXYGEN SATURATION: 98 % | SYSTOLIC BLOOD PRESSURE: 153 MMHG

## 2025-01-03 DIAGNOSIS — L97.322 NON-PRESSURE CHRONIC ULCER OF LEFT ANKLE WITH FAT LAYER EXPOSED: ICD-10-CM

## 2025-01-03 DIAGNOSIS — L97.801 NON-PRESSURE CHRONIC ULCER OF OTHER PART OF UNSPECIFIED LOWER LEG LIMITED TO BREAKDOWN OF SKIN: ICD-10-CM

## 2025-01-03 DIAGNOSIS — E11.622 TYPE 2 DIABETES MELLITUS WITH OTHER SKIN ULCER: ICD-10-CM

## 2025-01-03 PROCEDURE — G0463: CPT

## 2025-01-03 PROCEDURE — 99213 OFFICE O/P EST LOW 20 MIN: CPT

## 2025-01-04 DIAGNOSIS — E11.622 TYPE 2 DIABETES MELLITUS WITH OTHER SKIN ULCER: ICD-10-CM

## 2025-01-04 DIAGNOSIS — E11.40 TYPE 2 DIABETES MELLITUS WITH DIABETIC NEUROPATHY, UNSPECIFIED: ICD-10-CM

## 2025-01-04 DIAGNOSIS — L40.9 PSORIASIS, UNSPECIFIED: ICD-10-CM

## 2025-01-04 DIAGNOSIS — L97.322 NON-PRESSURE CHRONIC ULCER OF LEFT ANKLE WITH FAT LAYER EXPOSED: ICD-10-CM

## 2025-01-04 DIAGNOSIS — I10 ESSENTIAL (PRIMARY) HYPERTENSION: ICD-10-CM

## 2025-01-21 ENCOUNTER — INPATIENT (INPATIENT)
Facility: HOSPITAL | Age: 80
LOS: 2 days | Discharge: HOME | DRG: 690 | End: 2025-01-24
Attending: INTERNAL MEDICINE | Admitting: INTERNAL MEDICINE
Payer: MEDICARE

## 2025-01-21 VITALS
HEIGHT: 62.5 IN | OXYGEN SATURATION: 97 % | SYSTOLIC BLOOD PRESSURE: 184 MMHG | HEART RATE: 78 BPM | DIASTOLIC BLOOD PRESSURE: 101 MMHG | TEMPERATURE: 98 F | WEIGHT: 175.05 LBS | RESPIRATION RATE: 18 BRPM

## 2025-01-21 LAB
ALBUMIN SERPL ELPH-MCNC: 3.4 G/DL — SIGNIFICANT CHANGE UP (ref 3.3–5)
ALP SERPL-CCNC: 106 U/L — SIGNIFICANT CHANGE UP (ref 40–120)
ALT FLD-CCNC: 20 U/L — SIGNIFICANT CHANGE UP (ref 12–78)
ANION GAP SERPL CALC-SCNC: 7 MMOL/L — SIGNIFICANT CHANGE UP (ref 5–17)
APPEARANCE UR: CLEAR — SIGNIFICANT CHANGE UP
APTT BLD: 33.2 SEC — SIGNIFICANT CHANGE UP (ref 24.5–35.6)
AST SERPL-CCNC: 16 U/L — SIGNIFICANT CHANGE UP (ref 15–37)
BASOPHILS # BLD AUTO: 0.06 K/UL — SIGNIFICANT CHANGE UP (ref 0–0.2)
BASOPHILS NFR BLD AUTO: 0.6 % — SIGNIFICANT CHANGE UP (ref 0–2)
BILIRUB SERPL-MCNC: 0.3 MG/DL — SIGNIFICANT CHANGE UP (ref 0.2–1.2)
BILIRUB UR-MCNC: NEGATIVE — SIGNIFICANT CHANGE UP
BUN SERPL-MCNC: 13 MG/DL — SIGNIFICANT CHANGE UP (ref 7–23)
CALCIUM SERPL-MCNC: 8.8 MG/DL — SIGNIFICANT CHANGE UP (ref 8.5–10.1)
CHLORIDE SERPL-SCNC: 103 MMOL/L — SIGNIFICANT CHANGE UP (ref 96–108)
CO2 SERPL-SCNC: 27 MMOL/L — SIGNIFICANT CHANGE UP (ref 22–31)
COLOR SPEC: YELLOW — SIGNIFICANT CHANGE UP
CREAT SERPL-MCNC: 0.82 MG/DL — SIGNIFICANT CHANGE UP (ref 0.5–1.3)
DIFF PNL FLD: NEGATIVE — SIGNIFICANT CHANGE UP
EGFR: 73 ML/MIN/1.73M2 — SIGNIFICANT CHANGE UP
EOSINOPHIL # BLD AUTO: 0.13 K/UL — SIGNIFICANT CHANGE UP (ref 0–0.5)
EOSINOPHIL NFR BLD AUTO: 1.3 % — SIGNIFICANT CHANGE UP (ref 0–6)
GLUCOSE SERPL-MCNC: 192 MG/DL — HIGH (ref 70–99)
GLUCOSE UR QL: NEGATIVE MG/DL — SIGNIFICANT CHANGE UP
HCT VFR BLD CALC: 37.6 % — SIGNIFICANT CHANGE UP (ref 34.5–45)
HGB BLD-MCNC: 12 G/DL — SIGNIFICANT CHANGE UP (ref 11.5–15.5)
IMM GRANULOCYTES NFR BLD AUTO: 0.3 % — SIGNIFICANT CHANGE UP (ref 0–0.9)
INR BLD: 0.99 RATIO — SIGNIFICANT CHANGE UP (ref 0.85–1.16)
KETONES UR-MCNC: NEGATIVE MG/DL — SIGNIFICANT CHANGE UP
LACTATE SERPL-SCNC: 3.3 MMOL/L — HIGH (ref 0.7–2)
LEUKOCYTE ESTERASE UR-ACNC: ABNORMAL
LYMPHOCYTES # BLD AUTO: 2.69 K/UL — SIGNIFICANT CHANGE UP (ref 1–3.3)
LYMPHOCYTES # BLD AUTO: 27.7 % — SIGNIFICANT CHANGE UP (ref 13–44)
MCHC RBC-ENTMCNC: 25.8 PG — LOW (ref 27–34)
MCHC RBC-ENTMCNC: 31.9 G/DL — LOW (ref 32–36)
MCV RBC AUTO: 80.9 FL — SIGNIFICANT CHANGE UP (ref 80–100)
MONOCYTES # BLD AUTO: 0.75 K/UL — SIGNIFICANT CHANGE UP (ref 0–0.9)
MONOCYTES NFR BLD AUTO: 7.7 % — SIGNIFICANT CHANGE UP (ref 2–14)
NEUTROPHILS # BLD AUTO: 6.05 K/UL — SIGNIFICANT CHANGE UP (ref 1.8–7.4)
NEUTROPHILS NFR BLD AUTO: 62.4 % — SIGNIFICANT CHANGE UP (ref 43–77)
NITRITE UR-MCNC: NEGATIVE — SIGNIFICANT CHANGE UP
NRBC # BLD: 0 /100 WBCS — SIGNIFICANT CHANGE UP (ref 0–0)
NRBC BLD-RTO: 0 /100 WBCS — SIGNIFICANT CHANGE UP (ref 0–0)
PH UR: 6.5 — SIGNIFICANT CHANGE UP (ref 5–8)
PLATELET # BLD AUTO: 233 K/UL — SIGNIFICANT CHANGE UP (ref 150–400)
POTASSIUM SERPL-MCNC: 4.1 MMOL/L — SIGNIFICANT CHANGE UP (ref 3.5–5.3)
POTASSIUM SERPL-SCNC: 4.1 MMOL/L — SIGNIFICANT CHANGE UP (ref 3.5–5.3)
PROT SERPL-MCNC: 6.7 G/DL — SIGNIFICANT CHANGE UP (ref 6–8.3)
PROT UR-MCNC: 30 MG/DL
PROTHROM AB SERPL-ACNC: 11.6 SEC — SIGNIFICANT CHANGE UP (ref 9.9–13.4)
RBC # BLD: 4.65 M/UL — SIGNIFICANT CHANGE UP (ref 3.8–5.2)
RBC # FLD: 16.2 % — HIGH (ref 10.3–14.5)
SODIUM SERPL-SCNC: 137 MMOL/L — SIGNIFICANT CHANGE UP (ref 135–145)
SP GR SPEC: 1.01 — SIGNIFICANT CHANGE UP (ref 1–1.03)
UROBILINOGEN FLD QL: 0.2 MG/DL — SIGNIFICANT CHANGE UP (ref 0.2–1)
WBC # BLD: 9.71 K/UL — SIGNIFICANT CHANGE UP (ref 3.8–10.5)
WBC # FLD AUTO: 9.71 K/UL — SIGNIFICANT CHANGE UP (ref 3.8–10.5)

## 2025-01-21 PROCEDURE — 99285 EMERGENCY DEPT VISIT HI MDM: CPT | Mod: FS

## 2025-01-21 PROCEDURE — 71045 X-RAY EXAM CHEST 1 VIEW: CPT | Mod: 26

## 2025-01-21 RX ORDER — BACTERIOSTATIC SODIUM CHLORIDE 0.9 %
1000 VIAL (ML) INJECTION ONCE
Refills: 0 | Status: COMPLETED | OUTPATIENT
Start: 2025-01-21 | End: 2025-01-21

## 2025-01-21 RX ORDER — ERTAPENEM SODIUM 1 G/1
1000 INJECTION, POWDER, LYOPHILIZED, FOR SOLUTION INTRAMUSCULAR; INTRAVENOUS ONCE
Refills: 0 | Status: COMPLETED | OUTPATIENT
Start: 2025-01-21 | End: 2025-01-21

## 2025-01-21 RX ADMIN — ERTAPENEM SODIUM 120 MILLIGRAM(S): 1 INJECTION, POWDER, LYOPHILIZED, FOR SOLUTION INTRAMUSCULAR; INTRAVENOUS at 23:31

## 2025-01-21 RX ADMIN — Medication 1000 MILLILITER(S): at 23:31

## 2025-01-21 NOTE — ED PROVIDER NOTE - CLINICAL SUMMARY MEDICAL DECISION MAKING FREE TEXT BOX
79 year old female p/w UTI.  Patient noted darkening of her urine last week with mild dysuria.  Was seen at  3 days ago, UA negative. She was prescribed Macrobid but advised not to start until cultures results come back or symptoms worsen.  Patient received a call from  today that culture tested positive for E.Coli ESBL.  Patient was admitted in Sept for urosepsis requiring PICC line for Ertapenem.  Patient denies fever, n/v/back pain, abdominal pain.  Check labs, lactate, blood and urine cultures, CXR, EKG, IV abx, admit

## 2025-01-21 NOTE — ED PROVIDER NOTE - OBJECTIVE STATEMENT
79-year-old female with past medical history of hypertension, hyperlipidemia, diabetes presents today due to UTI.  Patient reports that she was recently admitted to ESBL E. coli and required a PICC line for Invanz outpatient.  Patient reports that over the last week she has been experiencing dark urine along with dysuria.  Patient went to an urgent care in which she was called today about a urine culture noting ESBL E Coli with significant resistance.  Patient denies nausea, vomiting, flank pain, fever, abdominal pain, or any other complaints.

## 2025-01-21 NOTE — ED PROVIDER NOTE - DIFFERENTIAL DIAGNOSIS
Differential Diagnosis Ddx includes but not limited to UTI, kidney stone, pyelonephritis, cystitis, urosepsis

## 2025-01-21 NOTE — ED PROVIDER NOTE - ATTENDING APP SHARED VISIT CONTRIBUTION OF CARE
79-year-old female with history of HLD, HTN, DM presents with UTI.  States that approximately 1 week ago she noted a darkening of her urine with mild dysuria.  She went to urgent care 3 days ago.  Urine analysis performed was unremarkable and a urine culture was sent.  She was given a prescription for Macrobid but was told not to start taking it unless she developed other symptoms including fever, hematuria, back pain, vomiting.  Patient states that she has not developed any other associated symptoms.  She received a call today from urgent care that her urine culture tested positive for E. coli ESBL.  She was advised to go to the emergency room for IV antibiotics.  Daughter at the bedside states that patient was admitted to [facility] in September 2024 for urosepsis, had a PICC line placed and required ertapenem. PMD Shan Argueta

## 2025-01-21 NOTE — ED ADULT TRIAGE NOTE - CHIEF COMPLAINT QUOTE
Pt ambulates to ED co UTI x1 week. Pt SIB UC from Saturday to come to ED for IV ABX as pt tested +ESBL. Endorsing itching and discolored urine. PMHx UTI since September requiring PICC line for IV ABX, HTN, DM, NKDA, AOx4.

## 2025-01-22 DIAGNOSIS — N39.0 URINARY TRACT INFECTION, SITE NOT SPECIFIED: ICD-10-CM

## 2025-01-22 DIAGNOSIS — E11.9 TYPE 2 DIABETES MELLITUS WITHOUT COMPLICATIONS: ICD-10-CM

## 2025-01-22 DIAGNOSIS — I10 ESSENTIAL (PRIMARY) HYPERTENSION: ICD-10-CM

## 2025-01-22 LAB
ANION GAP SERPL CALC-SCNC: 6 MMOL/L — SIGNIFICANT CHANGE UP (ref 5–17)
BUN SERPL-MCNC: 12 MG/DL — SIGNIFICANT CHANGE UP (ref 7–23)
CALCIUM SERPL-MCNC: 8.1 MG/DL — LOW (ref 8.5–10.1)
CHLORIDE SERPL-SCNC: 109 MMOL/L — HIGH (ref 96–108)
CO2 SERPL-SCNC: 26 MMOL/L — SIGNIFICANT CHANGE UP (ref 22–31)
CREAT SERPL-MCNC: 0.66 MG/DL — SIGNIFICANT CHANGE UP (ref 0.5–1.3)
EGFR: 90 ML/MIN/1.73M2 — SIGNIFICANT CHANGE UP
GLUCOSE BLDC GLUCOMTR-MCNC: 105 MG/DL — HIGH (ref 70–99)
GLUCOSE BLDC GLUCOMTR-MCNC: 126 MG/DL — HIGH (ref 70–99)
GLUCOSE BLDC GLUCOMTR-MCNC: 128 MG/DL — HIGH (ref 70–99)
GLUCOSE BLDC GLUCOMTR-MCNC: 98 MG/DL — SIGNIFICANT CHANGE UP (ref 70–99)
GLUCOSE SERPL-MCNC: 132 MG/DL — HIGH (ref 70–99)
HCT VFR BLD CALC: 35.8 % — SIGNIFICANT CHANGE UP (ref 34.5–45)
HGB BLD-MCNC: 11.5 G/DL — SIGNIFICANT CHANGE UP (ref 11.5–15.5)
LACTATE SERPL-SCNC: 1.9 MMOL/L — SIGNIFICANT CHANGE UP (ref 0.7–2)
LACTATE SERPL-SCNC: 2.2 MMOL/L — HIGH (ref 0.7–2)
MCHC RBC-ENTMCNC: 26.4 PG — LOW (ref 27–34)
MCHC RBC-ENTMCNC: 32.1 G/DL — SIGNIFICANT CHANGE UP (ref 32–36)
MCV RBC AUTO: 82.3 FL — SIGNIFICANT CHANGE UP (ref 80–100)
NRBC # BLD: 0 /100 WBCS — SIGNIFICANT CHANGE UP (ref 0–0)
NRBC BLD-RTO: 0 /100 WBCS — SIGNIFICANT CHANGE UP (ref 0–0)
PLATELET # BLD AUTO: 228 K/UL — SIGNIFICANT CHANGE UP (ref 150–400)
POTASSIUM SERPL-MCNC: 3.6 MMOL/L — SIGNIFICANT CHANGE UP (ref 3.5–5.3)
POTASSIUM SERPL-SCNC: 3.6 MMOL/L — SIGNIFICANT CHANGE UP (ref 3.5–5.3)
RBC # BLD: 4.35 M/UL — SIGNIFICANT CHANGE UP (ref 3.8–5.2)
RBC # FLD: 16.6 % — HIGH (ref 10.3–14.5)
SODIUM SERPL-SCNC: 141 MMOL/L — SIGNIFICANT CHANGE UP (ref 135–145)
WBC # BLD: 7.34 K/UL — SIGNIFICANT CHANGE UP (ref 3.8–10.5)
WBC # FLD AUTO: 7.34 K/UL — SIGNIFICANT CHANGE UP (ref 3.8–10.5)

## 2025-01-22 PROCEDURE — G0545: CPT

## 2025-01-22 PROCEDURE — 99222 1ST HOSP IP/OBS MODERATE 55: CPT

## 2025-01-22 PROCEDURE — 93010 ELECTROCARDIOGRAM REPORT: CPT

## 2025-01-22 RX ORDER — DM/PSEUDOEPHED/ACETAMINOPHEN 10-30-250
12.5 CAPSULE ORAL ONCE
Refills: 0 | Status: DISCONTINUED | OUTPATIENT
Start: 2025-01-22 | End: 2025-01-24

## 2025-01-22 RX ORDER — PYRIDOXINE HCL (VITAMIN B6) 25 MG
50 TABLET ORAL DAILY
Refills: 0 | Status: DISCONTINUED | OUTPATIENT
Start: 2025-01-22 | End: 2025-01-22

## 2025-01-22 RX ORDER — ERTAPENEM SODIUM 1 G/1
1000 INJECTION, POWDER, LYOPHILIZED, FOR SOLUTION INTRAMUSCULAR; INTRAVENOUS EVERY 24 HOURS
Refills: 0 | Status: DISCONTINUED | OUTPATIENT
Start: 2025-01-22 | End: 2025-01-24

## 2025-01-22 RX ORDER — BACTERIOSTATIC SODIUM CHLORIDE 0.9 %
1000 VIAL (ML) INJECTION ONCE
Refills: 0 | Status: COMPLETED | OUTPATIENT
Start: 2025-01-22 | End: 2025-01-22

## 2025-01-22 RX ORDER — GLUCAGON 3 MG/1
1 POWDER NASAL ONCE
Refills: 0 | Status: DISCONTINUED | OUTPATIENT
Start: 2025-01-22 | End: 2025-01-24

## 2025-01-22 RX ORDER — ATORVASTATIN CALCIUM 80 MG/1
40 TABLET, FILM COATED ORAL AT BEDTIME
Refills: 0 | Status: DISCONTINUED | OUTPATIENT
Start: 2025-01-22 | End: 2025-01-23

## 2025-01-22 RX ORDER — INSULIN LISPRO 100/ML
VIAL (ML) SUBCUTANEOUS
Refills: 0 | Status: DISCONTINUED | OUTPATIENT
Start: 2025-01-22 | End: 2025-01-24

## 2025-01-22 RX ORDER — ENOXAPARIN SODIUM 100 MG/ML
40 INJECTION SUBCUTANEOUS EVERY 24 HOURS
Refills: 0 | Status: DISCONTINUED | OUTPATIENT
Start: 2025-01-22 | End: 2025-01-24

## 2025-01-22 RX ORDER — DM/PSEUDOEPHED/ACETAMINOPHEN 10-30-250
15 CAPSULE ORAL ONCE
Refills: 0 | Status: DISCONTINUED | OUTPATIENT
Start: 2025-01-22 | End: 2025-01-24

## 2025-01-22 RX ORDER — ASPIRIN 81 MG/1
81 TABLET, COATED ORAL DAILY
Refills: 0 | Status: DISCONTINUED | OUTPATIENT
Start: 2025-01-22 | End: 2025-01-24

## 2025-01-22 RX ORDER — SODIUM CHLORIDE 9 G/ML
1000 INJECTION, SOLUTION INTRAVENOUS
Refills: 0 | Status: DISCONTINUED | OUTPATIENT
Start: 2025-01-22 | End: 2025-01-24

## 2025-01-22 RX ORDER — BACTERIOSTATIC SODIUM CHLORIDE 0.9 %
1000 VIAL (ML) INJECTION
Refills: 0 | Status: DISCONTINUED | OUTPATIENT
Start: 2025-01-22 | End: 2025-01-22

## 2025-01-22 RX ORDER — PREGABALIN CAPSULES, CV 225 MG/1
75 CAPSULE ORAL
Refills: 0 | Status: DISCONTINUED | OUTPATIENT
Start: 2025-01-22 | End: 2025-01-24

## 2025-01-22 RX ORDER — BACTERIOSTATIC SODIUM CHLORIDE 0.9 %
1000 VIAL (ML) INJECTION
Refills: 0 | Status: DISCONTINUED | OUTPATIENT
Start: 2025-01-22 | End: 2025-01-23

## 2025-01-22 RX ORDER — INSULIN GLARGINE-YFGN 100 [IU]/ML
5 INJECTION, SOLUTION SUBCUTANEOUS AT BEDTIME
Refills: 0 | Status: DISCONTINUED | OUTPATIENT
Start: 2025-01-22 | End: 2025-01-24

## 2025-01-22 RX ORDER — METOPROLOL SUCCINATE 25 MG
50 TABLET, EXTENDED RELEASE 24 HR ORAL DAILY
Refills: 0 | Status: DISCONTINUED | OUTPATIENT
Start: 2025-01-22 | End: 2025-01-24

## 2025-01-22 RX ORDER — DM/PSEUDOEPHED/ACETAMINOPHEN 10-30-250
25 CAPSULE ORAL ONCE
Refills: 0 | Status: DISCONTINUED | OUTPATIENT
Start: 2025-01-22 | End: 2025-01-24

## 2025-01-22 RX ADMIN — ASPIRIN 81 MILLIGRAM(S): 81 TABLET, COATED ORAL at 03:32

## 2025-01-22 RX ADMIN — Medication 50 MILLILITER(S): at 06:41

## 2025-01-22 RX ADMIN — Medication 1000 MILLILITER(S): at 02:06

## 2025-01-22 RX ADMIN — ENOXAPARIN SODIUM 40 MILLIGRAM(S): 100 INJECTION SUBCUTANEOUS at 06:39

## 2025-01-22 RX ADMIN — PREGABALIN CAPSULES, CV 75 MILLIGRAM(S): 225 CAPSULE ORAL at 22:48

## 2025-01-22 RX ADMIN — Medication 80 MILLILITER(S): at 22:49

## 2025-01-22 RX ADMIN — Medication 50 MILLIGRAM(S): at 03:31

## 2025-01-22 RX ADMIN — INSULIN GLARGINE-YFGN 5 UNIT(S): 100 INJECTION, SOLUTION SUBCUTANEOUS at 22:50

## 2025-01-22 RX ADMIN — Medication 20 MILLIGRAM(S): at 21:12

## 2025-01-22 RX ADMIN — ERTAPENEM SODIUM 120 MILLIGRAM(S): 1 INJECTION, POWDER, LYOPHILIZED, FOR SOLUTION INTRAMUSCULAR; INTRAVENOUS at 22:48

## 2025-01-22 RX ADMIN — PREGABALIN CAPSULES, CV 75 MILLIGRAM(S): 225 CAPSULE ORAL at 06:39

## 2025-01-22 NOTE — CARE COORDINATION ASSESSMENT. - CURRENT OCCUPATION, OR IF RETIRED, PREVIOUS OCCUPATION OF PATIENT
PHYSICAL THERAPY TREATMENT  Patient: Annabella Camacho (19 y.o. female)  Date: 11/6/2022  Diagnosis: Acute blood loss anemia [D62]  Small bowel obstruction (HCC) [K56.609] Small bowel obstruction (HCC)  Procedure(s) (LRB):  LAPAROSCOPIC LOOP COLOSTOMY PLACEMENT (N/A) 4 Days Post-Op  Precautions:    Chart, physical therapy assessment, plan of care and goals were reviewed. ASSESSMENT  Patient continues with skilled PT services and is progressing towards goals. Pt semi supine in bed upon PT arrival, agreeable to session. (See below for objective details and assist levels). Overall pt tolerated session well today with semi supine Therapeutic activities. Will continue to benefit from skilled PT services, and will continue to progress as tolerated. Current Level of Function Impacting Discharge (mobility/balance): pmh    Other factors to consider for discharge: pmh         PLAN :  Patient continues to benefit from skilled intervention to address the above impairments. Continue treatment per established plan of care to address goals. Recommend with staff: Out of bed to chair for meals, Encourage HEP in prep for ADLs/mobility, and LE elevation for management of edema    Recommendation for discharge: (in order for the patient to meet his/her long term goals)  [facility]     This discharge recommendation:  Has been made in collaboration with the attending provider and/or case management    IF patient discharges home will need the following DME: rolling walker       SUBJECTIVE:   Patient stated im good.     OBJECTIVE DATA SUMMARY:   Critical Behavior:  Neurologic State: Alert  Orientation Level: Oriented X4  Cognition: Follows commands  Safety/Judgement: Decreased awareness of environment  Functional Mobility Training:  Bed Mobility:                    Transfers:                                   Balance:     Ambulation/Gait Training: Stairs: Therapeutic Exercises:       EXERCISE   Sets   Reps   Active Active Assist   Passive Self ROM   Comments   Ankle Pumps  4 min [x] [] [] []    Quad Sets/Glut Sets  4 min [x] [] [] []    Hamstring Sets   [] [] [] []    Short Arc Quads  4min [x] [] [] []    Heel Slides  4 min [x] [] [] []    Straight Leg Raises   [] [] [] []    Hip abd/add  4min [x] [] [] []    Long Arc Quads   [] [] [] []    Marching   [] [] [] []    Supine Pullovers  4 min [] [x] [] []       Pain Rating:      Activity Tolerance:   Good    After treatment patient left in no apparent distress:   Bed locked and returned to lowest position, Supine in bed    GOALS:  HEP, Bed mobility, transfer, sit to stand, 75', 4 steps ascending/descending    COMMUNICATION/COLLABORATION:   The patients plan of care was discussed with: Physical therapy assistant.          Rebecca Escalante PTA, PT   Time Calculation: 37 mins desk job

## 2025-01-22 NOTE — H&P ADULT - PROBLEM SELECTOR PLAN 1
pt with recurrent uti due to esbl - outpt labs and cultures noted  she is symptomatic  resume invanz  id eval  fu inhouse cultures  trend labs  ivf  pain control

## 2025-01-22 NOTE — CONSULT NOTE ADULT - SUBJECTIVE AND OBJECTIVE BOX
Patient is a 79y old  Female who presents with a chief complaint of dysuria (22 Jan 2025 10:25)       HPI:  79-year-old female with past medical history of hypertension, hyperlipidemia, diabetes presents today due to UTI.  Patient reports that she was recently admitted to ESBL E. coli and required a PICC line for Invanz outpatient.  Patient reports that over the last week she has been experiencing dark urine along with dysuria.  Patient went to an urgent care in which she was called today about a urine culture noting ESBL E Coli with significant resistance.  Patient denies nausea, vomiting, flank pain, fever, abdominal pain, or any other complaints.   (22 Jan 2025 09:13)    Renal consulted for CKD and UTI. Feeling improvement and is urinating well.        PAST MEDICAL & SURGICAL HISTORY:  DM (diabetes mellitus)      Benign essential HTN      H/O Bell's palsy      Gout      HLD (hyperlipidemia)      Psoriasis      No significant past surgical history           FAMILY HISTORY:  NC    Social History:Non smoker    MEDICATIONS  (STANDING):  aspirin enteric coated 81 milliGRAM(s) Oral daily  atorvastatin 40 milliGRAM(s) Oral at bedtime  dextrose 5%. 1000 milliLiter(s) (50 mL/Hr) IV Continuous <Continuous>  dextrose 5%. 1000 milliLiter(s) (100 mL/Hr) IV Continuous <Continuous>  dextrose 50% Injectable 25 Gram(s) IV Push once  dextrose 50% Injectable 12.5 Gram(s) IV Push once  dextrose 50% Injectable 25 Gram(s) IV Push once  enoxaparin Injectable 40 milliGRAM(s) SubCutaneous every 24 hours  ertapenem  IVPB 1000 milliGRAM(s) IV Intermittent every 24 hours  glucagon  Injectable 1 milliGRAM(s) IntraMuscular once  insulin glargine Injectable (LANTUS) 5 Unit(s) SubCutaneous at bedtime  insulin lispro (ADMELOG) corrective regimen sliding scale   SubCutaneous Before meals and at bedtime  lisinopril 20 milliGRAM(s) Oral daily  metoprolol succinate ER 50 milliGRAM(s) Oral daily  pregabalin 75 milliGRAM(s) Oral two times a day  sodium chloride 0.9%. 1000 milliLiter(s) (80 mL/Hr) IV Continuous <Continuous>    MEDICATIONS  (PRN):  dextrose Oral Gel 15 Gram(s) Oral once PRN Blood Glucose LESS THAN 70 milliGRAM(s)/deciliter   Meds reviewed    Allergies    No Known Allergies    Intolerances         REVIEW OF SYSTEMS:    Review of Systems:   Constitutional: Denies fatigue  HEENT: Denies headaches and dizziness  Respiratory: denies SOB, cough, or wheezing  Cardiovascular: denies CP, palpitations  Gastrointestinal: Denies nausea, denies vomiting, diarrhea, constipation, abdominal pain, or bloody stools  Genitourinary: lieberman per HPI  Skin: denies rashes or itching  Musculoskeletal: denies muscle aches, joint swelling  Neurologic: Denies generalized weakness, denies loss of sensation, numbness, or tingling      Vital Signs Last 24 Hrs  T(C): 36.4 (22 Jan 2025 06:44), Max: 36.4 (21 Jan 2025 19:52)  T(F): 97.6 (22 Jan 2025 06:44), Max: 97.6 (21 Jan 2025 19:52)  HR: 80 (22 Jan 2025 06:44) (78 - 87)  BP: 145/81 (22 Jan 2025 06:44) (143/80 - 184/101)  BP(mean): --  RR: 18 (22 Jan 2025 06:44) (18 - 18)  SpO2: 99% (22 Jan 2025 06:44) (97% - 99%)    Parameters below as of 22 Jan 2025 06:44  Patient On (Oxygen Delivery Method): room air      Daily Height in cm: 158.75 (21 Jan 2025 19:52)    Daily     PHYSICAL EXAM:    GENERAL: NAD  HEAD:  Atraumatic, Normocephalic  EYES: EOMI, conjunctiva and sclera clear  ENMT: No Drainage from nares, No drainage from ears  NECK: Supple, neck  veins full  NERVOUS SYSTEM:  Awake and Alert  CHEST/LUNG: Clear to auscultation bilaterally; No rales, rhonchi, wheezing, or rubs  HEART: Regular rate and rhythm; No murmurs, rubs, or gallops  ABDOMEN: Soft, Nontender, Nondistended; Bowel sounds present  EXTREMITIES:  No Edema  SKIN: No rashes No obvious ecchymosis      LABS:                        11.5   7.34  )-----------( 228      ( 22 Jan 2025 13:40 )             35.8     01-22    141  |  109[H]  |  12  ----------------------------<  132[H]  3.6   |  26  |  0.66    Ca    8.1[L]      22 Jan 2025 13:40    TPro  6.7  /  Alb  3.4  /  TBili  0.3  /  DBili  x   /  AST  16  /  ALT  20  /  AlkPhos  106  01-21    PT/INR - ( 21 Jan 2025 23:22 )   PT: 11.6 sec;   INR: 0.99 ratio         PTT - ( 21 Jan 2025 23:22 )  PTT:33.2 sec  Urinalysis Basic - ( 22 Jan 2025 13:40 )    Color: x / Appearance: x / SG: x / pH: x  Gluc: 132 mg/dL / Ketone: x  / Bili: x / Urobili: x   Blood: x / Protein: x / Nitrite: x   Leuk Esterase: x / RBC: x / WBC x   Sq Epi: x / Non Sq Epi: x / Bacteria: x              RADIOLOGY & ADDITIONAL TESTS:

## 2025-01-22 NOTE — PATIENT PROFILE ADULT - FALL HARM RISK - HARM RISK INTERVENTIONS

## 2025-01-22 NOTE — PATIENT PROFILE ADULT - VISION (WITH CORRECTIVE LENSES IF THE PATIENT USUALLY WEARS THEM):
PCP for Immediate Care Normal vision: sees adequately in most situations; can see medication labels, newsprint

## 2025-01-22 NOTE — CARE COORDINATION ASSESSMENT. - OTHER PERTINENT REFERRAL INFORMATION
Sw met with Pt at bedside. Pt is A & O x4 and able to make her needs known. Sw Introduced self and role and pt expressed under standing. Pt lives in pvt home w/ granddaugther, her  and her great grandchildren. Pt is indep with ADL's and has a cane, RW and has tons a family support. Pt goes to the wound care center at [place] and got services from [facility]. PCP: Jet Argueta [facility]: rita sanders.

## 2025-01-22 NOTE — ED ADULT NURSE NOTE - NSFALLHARMRISKINTERV_ED_ALL_ED
Communicate risk of Fall with Harm to all staff, patient, and family/Provide visual cue: red socks, yellow wristband, yellow gown, etc/Reinforce activity limits and safety measures with patient and family/Bed in lowest position, wheels locked, appropriate side rails in place/Call bell, personal items and telephone in reach/Instruct patient to call for assistance before getting out of bed/chair/stretcher/Non-slip footwear applied when patient is off stretcher/[place] to call system/Physically safe environment - no spills, clutter or unnecessary equipment/Purposeful Proactive Rounding/Room/bathroom lighting operational, light cord in reach Assistance OOB with selected safe patient handling equipment if applicable/Assistance with ambulation/Communicate risk of Fall with Harm to all staff, patient, and family/Monitor gait and stability/Provide visual cue: red socks, yellow wristband, yellow gown, etc/Reinforce activity limits and safety measures with patient and family/Bed in lowest position, wheels locked, appropriate side rails in place/Call bell, personal items and telephone in reach/Instruct patient to call for assistance before getting out of bed/chair/stretcher/Non-slip footwear applied when patient is off stretcher/[place] to call system/Physically safe environment - no spills, clutter or unnecessary equipment/Purposeful Proactive Rounding/Room/bathroom lighting operational, light cord in reach

## 2025-01-22 NOTE — ED ADULT NURSE NOTE - OBJECTIVE STATEMENT
Patient complaining of UTI like symptoms. PAtient states has been hospitalized in the past for UTI, bacteria is abx resistant. hx of PICC line for home abx. Was given oral abx, no relief. sent here for iv abx. Patient complaining of UTI like symptoms. PAtient states has been hospitalized in the past for UTI, bacteria is abx resistant. hx of PICC line for home abx. Was given oral abx, no relief. sent here for iv abx. LUE freestyle lauren

## 2025-01-22 NOTE — CONSULT NOTE ADULT - SUBJECTIVE AND OBJECTIVE BOX
[facility]  INFECTIOUS DISEASES   [address] [place] [address]  Tel: [phone]     Fax: [phone]  ========================================================  MD Michael Ivan Michelle, MD Shah, Kaushal, MD Sunjit, Jaspal, MD Sehrish Shahid, MD   ========================================================    [id number]  HORACE ARROYOARROSENDO     CC: Patient is a 79y old  Female who presents with a chief complaint of dysuria (2025 09:13)    HPI:  79-year-old female with past medical history of hypertension, hyperlipidemia, diabetes presents today due to UTI.  Patient reports that she was recently admitted to ESBL E. coli and required a PICC line for Invanz outpatient.  Patient reports that over the last week she has been experiencing dark urine along with dysuria.  Patient went to an urgent care in which she was called today about a urine culture noting ESBL E Coli with significant resistance.  Patient denies nausea, vomiting, flank pain, fever, abdominal pain, or any other complaints.   (2025 09:13)    PAST MEDICAL & SURGICAL HISTORY:  DM (diabetes mellitus)  Benign essential HTN  H/O Bell's palsy  Gout  HLD (hyperlipidemia)  Psoriasis  No significant past surgical history    Social Hx: No current smoking, EtOH or drugs     FAMILY HISTORY:  Noncontributory     Allergies  No Known Allergies    MEDICATIONS  (STANDING):  aspirin enteric coated 81 milliGRAM(s) Oral daily  atorvastatin 40 milliGRAM(s) Oral at bedtime  dextrose 5%. 1000 milliLiter(s) (50 mL/Hr) IV Continuous <Continuous>  dextrose 5%. 1000 milliLiter(s) (100 mL/Hr) IV Continuous <Continuous>  dextrose 50% Injectable 25 Gram(s) IV Push once  dextrose 50% Injectable 12.5 Gram(s) IV Push once  dextrose 50% Injectable 25 Gram(s) IV Push once  enoxaparin Injectable 40 milliGRAM(s) SubCutaneous every 24 hours  ertapenem  IVPB 1000 milliGRAM(s) IV Intermittent every 24 hours  glucagon  Injectable 1 milliGRAM(s) IntraMuscular once  insulin glargine Injectable (LANTUS) 5 Unit(s) SubCutaneous at bedtime  insulin lispro (ADMELOG) corrective regimen sliding scale   SubCutaneous Before meals and at bedtime  lisinopril 20 milliGRAM(s) Oral daily  metoprolol succinate ER 50 milliGRAM(s) Oral daily  pregabalin 75 milliGRAM(s) Oral two times a day  sodium chloride 0.9%. 1000 milliLiter(s) (80 mL/Hr) IV Continuous <Continuous>     REVIEW OF SYSTEMS:  CONSTITUTIONAL:  No Fever or chills  HEENT:  No diplopia or blurred vision.  No sore throat or runny nose.  CARDIOVASCULAR:  No chest pain   RESPIRATORY:  No cough, shortness of breath, PND or orthopnea.  GASTROINTESTINAL:  No nausea, vomiting or diarrhea.  GENITOURINARY:  No dysuria, frequency or urgency. No Blood in urine  MUSCULOSKELETAL:  no joint aches, no muscle pain  SKIN:  No change in skin, hair or nails.    Physical Exam:  Vital Signs Last 24 Hrs  T(C): 36.4 (2025 06:44), Max: 36.4 (2025 19:52)  T(F): 97.6 (2025 06:44), Max: 97.6 (2025 19:52)  HR: 80 (2025 06:44) (78 - 87)  BP: 145/81 (2025 06:44) (143/80 - 184/101)  RR: 18 (2025 06:44) (18 - 18)  SpO2: 99% (2025 06:44) (97% - 99%)  Parameters below as of 2025 06:44  Patient On (Oxygen Delivery Method): room air  Height (cm): 158.8 ( @ 19:52)  Weight (kg): 79.4 ( @ 19:52)  BMI (kg/m2): 31.5 ( @ 19:52)  BSA (m2): 1.82 ( @ 19:52)  GEN: NAD  HEENT: normocephalic and atraumatic. EOMI. PERRL.    NECK: Supple.  No lymphadenopathy   LUNGS: Clear to auscultation.  HEART: Regular rate and rhythm w  ABDOMEN: Soft, nontender, and nondistended.   No CVA tenderness, no ko   EXTREMITIES: Without edema.  NEUROLOGIC: grossly intact.  PSYCHIATRIC: Appropriate affect .  SKIN: No rash     Labs:      137  |  103  |  13  ----------------------------<  192[H]  4.1   |  27  |  0.82    Ca    8.8      2025 23:22    TPro  6.7  /  Alb  3.4  /  TBili  0.3  /  DBili  x   /  AST  16  /  ALT  20  /  AlkPhos  106                          12.0   9.71  )-----------( 233      ( 2025 23:22 )             37.6     PT/INR - ( 2025 23:22 )   PT: 11.6 sec;   INR: 0.99 ratio    PTT - ( 2025 23:22 )  PTT:33.2 sec  Urinalysis Basic - ( 2025 23:22 )    Color: Yellow / Appearance: Clear / S.006 / pH: x  Gluc: 192 mg/dL / Ketone: Negative mg/dL  / Bili: Negative / Urobili: 0.2 mg/dL   Blood: x / Protein: 30 mg/dL / Nitrite: Negative   Leuk Esterase: Trace / RBC: 1 /HPF / WBC 10 /HPF   Sq Epi: x / Non Sq Epi: x / Bacteria: Moderate /HPF    LIVER FUNCTIONS - ( 2025 23:22 )  Alb: 3.4 g/dL / Pro: 6.7 g/dL / ALK PHOS: 106 U/L / ALT: 20 U/L / AST: 16 U/L / GGT: x           All imaging and other data have been reviewed.      Assessment and Plan:       Thank you for courtesy of this consult.     Will follow.  Discussed with the primary team.     Bhavna Huff MD  Division of Infectious Diseases   Please call ID service at [phone] with any question.    75 minutes spent on total encounter assessing patient, examination, chart review, counseling and coordinating care by the attending physician/nurse/care manager.   [facility]  INFECTIOUS DISEASES   [address] [place] [address]  Tel: [phone]     Fax: [phone]  ========================================================  MD Michael Ivan Michelle, MD Shah, Kaushal, MD Sunjit, Jaspal, MD Sehrish Shahid, MD   ========================================================    N-[id number]  HORACE ARROYOARROSENDO     CC: Patient is a 79y old  Female who presents with a chief complaint of dysuria (2025 09:13)    HPI:  79-year-old female with past medical history of hypertension, hyperlipidemia, diabetes presents today due to UTI.  Patient reports that she was recently admitted to ESBL E. coli and required a PICC line for Invanz outpatient.  Patient reports that over the last week she has been experiencing dark urine along with dysuria.  Patient went to an urgent care in which she was called today about a urine culture noting ESBL E Coli with significant resistance.  Patient denies nausea, vomiting, flank pain, fever, abdominal pain, or any other complaints.   (2025 09:13)    PAST MEDICAL & SURGICAL HISTORY:  DM (diabetes mellitus)  Benign essential HTN  H/O Bell's palsy  Gout  HLD (hyperlipidemia)  Psoriasis  No significant past surgical history    Social Hx: No current smoking, EtOH or drugs     FAMILY HISTORY:  Noncontributory     Allergies  No Known Allergies    MEDICATIONS  (STANDING):  aspirin enteric coated 81 milliGRAM(s) Oral daily  atorvastatin 40 milliGRAM(s) Oral at bedtime  dextrose 5%. 1000 milliLiter(s) (50 mL/Hr) IV Continuous <Continuous>  dextrose 5%. 1000 milliLiter(s) (100 mL/Hr) IV Continuous <Continuous>  dextrose 50% Injectable 25 Gram(s) IV Push once  dextrose 50% Injectable 12.5 Gram(s) IV Push once  dextrose 50% Injectable 25 Gram(s) IV Push once  enoxaparin Injectable 40 milliGRAM(s) SubCutaneous every 24 hours  ertapenem  IVPB 1000 milliGRAM(s) IV Intermittent every 24 hours  glucagon  Injectable 1 milliGRAM(s) IntraMuscular once  insulin glargine Injectable (LANTUS) 5 Unit(s) SubCutaneous at bedtime  insulin lispro (ADMELOG) corrective regimen sliding scale   SubCutaneous Before meals and at bedtime  lisinopril 20 milliGRAM(s) Oral daily  metoprolol succinate ER 50 milliGRAM(s) Oral daily  pregabalin 75 milliGRAM(s) Oral two times a day  sodium chloride 0.9%. 1000 milliLiter(s) (80 mL/Hr) IV Continuous <Continuous>     REVIEW OF SYSTEMS:  CONSTITUTIONAL:  No Fever or chills  HEENT:  No diplopia or blurred vision.  No sore throat or runny nose.  CARDIOVASCULAR:  No chest pain   RESPIRATORY:  No cough, shortness of breath, PND or orthopnea.  GASTROINTESTINAL:  No nausea, vomiting or diarrhea.  GENITOURINARY:  No dysuria, frequency or urgency. No Blood in urine  MUSCULOSKELETAL:  no joint aches, no muscle pain  SKIN:  No change in skin, hair or nails.    Physical Exam:  Vital Signs Last 24 Hrs  T(C): 36.4 (2025 06:44), Max: 36.4 (2025 19:52)  T(F): 97.6 (2025 06:44), Max: 97.6 (2025 19:52)  HR: 80 (2025 06:44) (78 - 87)  BP: 145/81 (2025 06:44) (143/80 - 184/101)  RR: 18 (2025 06:44) (18 - 18)  SpO2: 99% (2025 06:44) (97% - 99%)  Parameters below as of 2025 06:44  Patient On (Oxygen Delivery Method): room air  Height (cm): 158.8 ( @ 19:52)  Weight (kg): 79.4 ( @ 19:52)  BMI (kg/m2): 31.5 ( @ 19:52)  BSA (m2): 1.82 ( @ 19:52)  GEN: NAD  HEENT: normocephalic and atraumatic. EOMI. PERRL.    NECK: Supple.  No lymphadenopathy   LUNGS: Clear to auscultation.  HEART: Regular rate and rhythm w  ABDOMEN: Soft, nontender, and nondistended.   No CVA tenderness, no ko   EXTREMITIES: Without edema.  NEUROLOGIC: grossly intact.  PSYCHIATRIC: Appropriate affect .  SKIN: No rash     Labs:      137  |  103  |  13  ----------------------------<  192[H]  4.1   |  27  |  0.82    Ca    8.8      2025 23:22    TPro  6.7  /  Alb  3.4  /  TBili  0.3  /  DBili  x   /  AST  16  /  ALT  20  /  AlkPhos  106                          12.0   9.71  )-----------( 233      ( 2025 23:22 )             37.6     PT/INR - ( 2025 23:22 )   PT: 11.6 sec;   INR: 0.99 ratio    PTT - ( 2025 23:22 )  PTT:33.2 sec  Urinalysis Basic - ( 2025 23:22 )    Color: Yellow / Appearance: Clear / S.006 / pH: x  Gluc: 192 mg/dL / Ketone: Negative mg/dL  / Bili: Negative / Urobili: 0.2 mg/dL   Blood: x / Protein: 30 mg/dL / Nitrite: Negative   Leuk Esterase: Trace / RBC: 1 /HPF / WBC 10 /HPF   Sq Epi: x / Non Sq Epi: x / Bacteria: Moderate /HPF    LIVER FUNCTIONS - ( 2025 23:22 )  Alb: 3.4 g/dL / Pro: 6.7 g/dL / ALK PHOS: 106 U/L / ALT: 20 U/L / AST: 16 U/L / GGT: x           All imaging and other data have been reviewed.      Assessment and Plan:   78yo woman with PMH of HTN, DM2 and ESBL UTI was called by the urgent care to go to ED for another ESBL UTI from UCx taken few days ago.   Currently she is asymptomatic. Not taken any ABx.     UA with 10 WBC     # UTI   # ESBL infection     - Will follow cultures   - Can  start Ertapenem 1mg daily   - If blood cultures negative can place a midline for 5-7days treatment    Thank you for courtesy of this consult.     Will follow.  Discussed with the daughter at the bedside.     Bhavna Huff MD  Division of Infectious Diseases   Please call ID service at [phone] with any question.    75 minutes spent on total encounter assessing patient, examination, chart review, counseling and coordinating care by the attending physician/nurse/care manager.

## 2025-01-22 NOTE — H&P ADULT - NSTOBACCOSCREENHP_GEN_A_CS
Kati Quiles    Nursing Report ED to Floor:  Mental status: GCS 15  Ambulatory status: x1 assist  Oxygen Therapy:  RA  Cardiac Rhythm: sinus hailey  Admitted from: home  Safety Concerns:  fall risk  Social Issues: none known  ED Room #:  3    ED Nurse Phone Extension - [phone] or may call [phone].      HPI:   Chief Complaint   Patient presents with    Altered Mental Status       Past Medical History:  Past Medical History:   Diagnosis Date    Acute sinusitis     Acute upper respiratory infection     Arthritis     nabumetone daily    Breast hypertrophy 7/15/2024    CTS (carpal tunnel syndrome)     Diabetes mellitus type I     dx at 23yo, last A1C 7.5    Encephalopathy 2024    Fatigue     Headache, tension-type     HLD (hyperlipidemia)     HTN (hypertension)     Lower back pain     Malignant melanoma     lesion excised, no issues since    Nausea and vomiting     Pain, upper back     Shoulder pain     Garcia-Zurdo syndrome     with sulfa    Thoracic disc disorder         Past Surgical History:  Past Surgical History:   Procedure Laterality Date     SECTION  ,     ENDOMETRIAL ABLATION      HAND SURGERY      TIBIA FRACTURE SURGERY      WISDOM TOOTH EXTRACTION          Admitting Doctor:   Brian Joseph Kerley, DO    Consulting Provider(s):  Consults       Date and Time Order Name Status Description    2024  2:24 PM Inpatient Neurology Consult Stroke Completed              Admitting Diagnosis:   The encounter diagnosis was Acute encephalopathy.    Most Recent Vitals:   Vitals:    24 1530 24 1601 24 1631 24 1700   BP: 143/66 141/73 149/69 140/80   BP Location:       Patient Position:       Pulse: 59 56 55 54   Resp:  18  18   Temp:       TempSrc:       SpO2: 94% 94% 93% 94%   Weight:       Height:           Active LDAs/IV Access:   Lines, Drains & Airways       Active LDAs       Name Placement date Placement time Site Days    Peripheral IV 24 1436 Right  Antecubital 09/11/24  1436  Antecubital  less than 1                    Labs (abnormal labs have a star):   Labs Reviewed   SINGLE HS TROPONIN T - Abnormal; Notable for the following components:       Result Value    HS Troponin T 29 (*)     All other components within normal limits    Narrative:     High Sensitive Troponin T Reference Range:  <14.0 ng/L- Negative Female for AMI  <22.0 ng/L- Negative Male for AMI  >=14 - Abnormal Female indicating possible myocardial injury.  >=22 - Abnormal Male indicating possible myocardial injury.   Clinicians would have to utilize clinical acumen, EKG, Troponin, and serial changes to determine if it is an Acute Myocardial Infarction or myocardial injury due to an underlying chronic condition.        AST - Abnormal; Notable for the following components:    AST (SGOT) 38 (*)     All other components within normal limits   CBC WITH AUTO DIFFERENTIAL - Abnormal; Notable for the following components:    Lymphocyte % 17.2 (*)     Basophil % 2.0 (*)     Immature Grans % 0.7 (*)     All other components within normal limits   URINE DRUG SCREEN - Abnormal; Notable for the following components:    Opiate Screen Positive (*)     All other components within normal limits    Narrative:     Cutoff For Drugs Screened:    Amphetamines               500 ng/ml  Barbiturates               200 ng/ml  Benzodiazepines            150 ng/ml  Cocaine                    150 ng/ml  Methadone                  200 ng/ml  Opiates                    100 ng/ml  Phencyclidine               25 ng/ml  THC                         50 ng/ml  Methamphetamine            500 ng/ml  Tricyclic Antidepressants  300 ng/ml  Oxycodone                  100 ng/ml  Buprenorphine               10 ng/ml    The normal value for all drugs tested is negative. This report includes unconfirmed screening results, with the cutoff values listed, to be used for medical treatment purposes only.  Unconfirmed results must not be used for  non-medical purposes such as employment or legal testing.  Clinical consideration should be applied to any drug of abuse test, particularly when unconfirmed results are used.     TSH - Abnormal; Notable for the following components:    TSH 4.640 (*)     All other components within normal limits    Narrative:     Due to abnormal TSH results, suggest ordering Free T4.   COMPREHENSIVE METABOLIC PANEL - Abnormal; Notable for the following components:    Glucose 238 (*)     Sodium 134 (*)     Chloride 97 (*)     Calcium 8.5 (*)     Albumin 3.3 (*)     AST (SGOT) 37 (*)     All other components within normal limits    Narrative:     GFR Normal >60  Chronic Kidney Disease <60  Kidney Failure <15     POCT GLUCOSE FINGERSTICK - Abnormal; Notable for the following components:    Glucose 224 (*)     All other components within normal limits   APTT - Normal    Narrative:     PTT = The equivalent PTT values for the therapeutic range of heparin levels at 0.3 to 0.5 U/ml are 60 to 70 seconds.   ALT - Normal   ETHANOL - Normal    Narrative:     Elevated lactic acid concentration and lactate dehydrogenase(LD) activity may falsely elevate enzymatically determined ethanol levels. Not for legal purposes.    MAGNESIUM - Normal   CK - Normal   MYOGLOBIN, SERUM - Normal    Narrative:     Results may be falsely decreased if patient taking Biotin.     T4, FREE - Normal   FENTANYL, URINE - Normal    Narrative:     Negative Threshold:      Fentanyl 5 ng/mL     The normal value for the drug tested is negative. This report includes final unconfirmed screening results to be used for medical treatment purposes only. Unconfirmed results must not be used for non-medical purposes such as employment or legal testing. Clinical consideration should be applied to any drug of abuse test, particularly when unconfirmed results are used.          LACTIC ACID, PLASMA - Normal   POCT PROTIME - INR - Normal   POCT CREATININE - Normal   RAINBOW DRAW     Narrative:     The following orders were created for panel order [place] Draw.  Procedure                               Abnormality         Status                     ---------                               -----------         ------                     Green Top (Gel)[[id number]]                                  Final result               Lavender Top[[id number]]                                     Final result               Gold Top - SST[[id number]]                                   Final result               Landaverde Top[[id number]]                                         Final result               Light Blue Top[[id number]]                                   Final result                 Please view results for these tests on the individual orders.   POCT CHEM 8   POCT PROTIME - INR   CBC AND DIFFERENTIAL    Narrative:     The following orders were created for panel order CBC & Differential.  Procedure                               Abnormality         Status                     ---------                               -----------         ------                     CBC Auto Differential[[id number]]        Abnormal            Final result                 Please view results for these tests on the individual orders.   GREEN TOP   LAVENDER TOP   GOLD TOP - SST   GRAY TOP   LIGHT BLUE TOP       Meds Given in ED:   Medications   sodium chloride 0.9 % flush 10 mL (has no administration in time range)   iopamidol (ISOVUE-370) 76 % injection 140 mL (140 mL Intravenous Given 9/11/24 1451)           Last NIH score:                                                          Dysphagia screening results:  Patient Factors Component (Dysphagia:Stroke or Rule-out)  Best Eye Response: 4-->(E4) spontaneous (09/11/24 1600)  Best Motor Response: 6-->(M6) obeys commands (09/11/24 1600)  Best Verbal Response: 5-->(V5) oriented (09/11/24 1600)  Ephraim Coma Scale Score: 15 (09/11/24 1600)  Is there Facial Asymmetry/Weakness?: No (09/11/24  1506)  Patient Assessment Result: (!) Fail (09/11/24 1507)     Ephraim Coma Scale:  No data recorded     CIWA:        Restraint Type:            Isolation Status:  No active isolations         No

## 2025-01-22 NOTE — PATIENT CHOICE NOTE. - NSPTCHOICESTATE_GEN_ALL_CORE

## 2025-01-22 NOTE — H&P ADULT - NSHPPOADEEPVENOUSTHROMB_GEN_A_CORE
Called pt. She informed me that she was seen in the UC on 12- for having blood in her urine and small blood clots in the urine. She informed me that she was prescribed a antibiotic in the UC. She also informed me that she was told she the culture was negative. She is not sure if she is to continue the antibiotic. She informed me that she is feeling ok. Just not sure if she is to continue the antibiotic.    no

## 2025-01-22 NOTE — H&P ADULT - CARDIOVASCULAR
Please be informed that if you contact our office outside of normal business hours the physician on call cannot help with any scheduling or rescheduling issues, procedure instruction questions or any type of medication issue. We advise you for any urgent/emergency that you go to the nearest emergency room!     PLEASE CALL OUR OFFICE DURING NORMAL BUSINESS HOURS    Monday - Friday   8 am to 5 pm    [place]: [place] [address]: [phone]    [place]:  [phone] regular rate and rhythm negative

## 2025-01-22 NOTE — CARE COORDINATION ASSESSMENT. - NSCAREPROVIDERS_GEN_ALL_CORE_FT
CARE PROVIDERS:  Accepting Physician: Dilcia Mai  Administration: Salvador Jaime  Admitting: Dilcia Mai  Attending: Dilcia Mai  Consultant: Letitia Amaro  Consultant: Bhavna Huff  Consultant: Ifrah Rogers  ED ACP: Tanmay Akins ED Attending: Rahel Jett  ED Nurse: Jolene Mahoney  Nurse: Gabriella Ascencio  Override: Jolene Mahoney  Primary Team: Dilcia Mai  : Maya Eaton// Supp. Assoc.: Silverio Scott

## 2025-01-23 ENCOUNTER — APPOINTMENT (OUTPATIENT)
Dept: WOUND CARE | Facility: HOSPITAL | Age: 80
End: 2025-01-23

## 2025-01-23 DIAGNOSIS — S81.809A UNSPECIFIED OPEN WOUND, UNSPECIFIED LOWER LEG, INITIAL ENCOUNTER: ICD-10-CM

## 2025-01-23 DIAGNOSIS — L97.929 NON-PRESSURE CHRONIC ULCER OF UNSPECIFIED PART OF LEFT LOWER LEG WITH UNSPECIFIED SEVERITY: ICD-10-CM

## 2025-01-23 LAB
ANION GAP SERPL CALC-SCNC: 5 MMOL/L — SIGNIFICANT CHANGE UP (ref 5–17)
BUN SERPL-MCNC: 11 MG/DL — SIGNIFICANT CHANGE UP (ref 7–23)
CALCIUM SERPL-MCNC: 8.2 MG/DL — LOW (ref 8.5–10.1)
CHLORIDE SERPL-SCNC: 109 MMOL/L — HIGH (ref 96–108)
CO2 SERPL-SCNC: 28 MMOL/L — SIGNIFICANT CHANGE UP (ref 22–31)
CREAT SERPL-MCNC: 0.59 MG/DL — SIGNIFICANT CHANGE UP (ref 0.5–1.3)
EGFR: 92 ML/MIN/1.73M2 — SIGNIFICANT CHANGE UP
GLUCOSE BLDC GLUCOMTR-MCNC: 118 MG/DL — HIGH (ref 70–99)
GLUCOSE BLDC GLUCOMTR-MCNC: 118 MG/DL — HIGH (ref 70–99)
GLUCOSE BLDC GLUCOMTR-MCNC: 120 MG/DL — HIGH (ref 70–99)
GLUCOSE BLDC GLUCOMTR-MCNC: 94 MG/DL — SIGNIFICANT CHANGE UP (ref 70–99)
GLUCOSE SERPL-MCNC: 117 MG/DL — HIGH (ref 70–99)
HCT VFR BLD CALC: 33.2 % — LOW (ref 34.5–45)
HGB BLD-MCNC: 10.8 G/DL — LOW (ref 11.5–15.5)
MCHC RBC-ENTMCNC: 26.3 PG — LOW (ref 27–34)
MCHC RBC-ENTMCNC: 32.5 G/DL — SIGNIFICANT CHANGE UP (ref 32–36)
MCV RBC AUTO: 81 FL — SIGNIFICANT CHANGE UP (ref 80–100)
NRBC # BLD: 0 /100 WBCS — SIGNIFICANT CHANGE UP (ref 0–0)
NRBC BLD-RTO: 0 /100 WBCS — SIGNIFICANT CHANGE UP (ref 0–0)
PLATELET # BLD AUTO: 205 K/UL — SIGNIFICANT CHANGE UP (ref 150–400)
POTASSIUM SERPL-MCNC: 3.5 MMOL/L — SIGNIFICANT CHANGE UP (ref 3.5–5.3)
POTASSIUM SERPL-SCNC: 3.5 MMOL/L — SIGNIFICANT CHANGE UP (ref 3.5–5.3)
RBC # BLD: 4.1 M/UL — SIGNIFICANT CHANGE UP (ref 3.8–5.2)
RBC # FLD: 16.7 % — HIGH (ref 10.3–14.5)
SODIUM SERPL-SCNC: 142 MMOL/L — SIGNIFICANT CHANGE UP (ref 135–145)
WBC # BLD: 6.95 K/UL — SIGNIFICANT CHANGE UP (ref 3.8–10.5)
WBC # FLD AUTO: 6.95 K/UL — SIGNIFICANT CHANGE UP (ref 3.8–10.5)

## 2025-01-23 PROCEDURE — 36000 PLACE NEEDLE IN VEIN: CPT

## 2025-01-23 PROCEDURE — 99222 1ST HOSP IP/OBS MODERATE 55: CPT

## 2025-01-23 PROCEDURE — 76937 US GUIDE VASCULAR ACCESS: CPT | Mod: 26

## 2025-01-23 PROCEDURE — 99232 SBSQ HOSP IP/OBS MODERATE 35: CPT

## 2025-01-23 PROCEDURE — G0545: CPT

## 2025-01-23 RX ORDER — EZETIMIBE 10 MG
10 TABLET ORAL DAILY
Refills: 0 | Status: DISCONTINUED | OUTPATIENT
Start: 2025-01-23 | End: 2025-01-24

## 2025-01-23 RX ORDER — HYDRALAZINE HCL 100 MG
10 TABLET ORAL ONCE
Refills: 0 | Status: DISCONTINUED | OUTPATIENT
Start: 2025-01-23 | End: 2025-01-23

## 2025-01-23 RX ORDER — EZETIMIBE 10 MG
1 TABLET ORAL
Refills: 0 | DISCHARGE

## 2025-01-23 RX ORDER — ESTROGENS, CONJUGATED 0.625 MG
1 TABLET ORAL DAILY
Refills: 0 | Status: DISCONTINUED | OUTPATIENT
Start: 2025-01-23 | End: 2025-01-24

## 2025-01-23 RX ADMIN — Medication 50 MILLIGRAM(S): at 05:44

## 2025-01-23 RX ADMIN — Medication 20 MILLIGRAM(S): at 14:24

## 2025-01-23 RX ADMIN — ERTAPENEM SODIUM 120 MILLIGRAM(S): 1 INJECTION, POWDER, LYOPHILIZED, FOR SOLUTION INTRAMUSCULAR; INTRAVENOUS at 22:37

## 2025-01-23 RX ADMIN — PREGABALIN CAPSULES, CV 75 MILLIGRAM(S): 225 CAPSULE ORAL at 05:44

## 2025-01-23 RX ADMIN — ASPIRIN 81 MILLIGRAM(S): 81 TABLET, COATED ORAL at 13:04

## 2025-01-23 RX ADMIN — Medication 10 MILLIGRAM(S): at 13:04

## 2025-01-23 RX ADMIN — ENOXAPARIN SODIUM 40 MILLIGRAM(S): 100 INJECTION SUBCUTANEOUS at 05:44

## 2025-01-23 RX ADMIN — INSULIN GLARGINE-YFGN 5 UNIT(S): 100 INJECTION, SOLUTION SUBCUTANEOUS at 23:00

## 2025-01-23 RX ADMIN — Medication 1 APPLICATION(S): at 17:57

## 2025-01-23 RX ADMIN — PREGABALIN CAPSULES, CV 75 MILLIGRAM(S): 225 CAPSULE ORAL at 17:54

## 2025-01-23 NOTE — CONSULT NOTE ADULT - SUBJECTIVE AND OBJECTIVE BOX
Chief Complaint: Left leg ulcer.    HPI: Patient in hospital because of UTI, has had left leg ulcer for some time, also has skin cancer of scalp which is being managed by her out side dermatologist      PAST MEDICAL & SURGICAL HISTORY:  DM (diabetes mellitus)      Benign essential HTN      H/O Bell's palsy      Gout      HLD (hyperlipidemia)      Psoriasis      No significant past surgical history          Allergies    No Known Allergies    Intolerances        MEDICATIONS  (STANDING):  aspirin enteric coated 81 milliGRAM(s) Oral daily  dextrose 5%. 1000 milliLiter(s) (50 mL/Hr) IV Continuous <Continuous>  dextrose 5%. 1000 milliLiter(s) (100 mL/Hr) IV Continuous <Continuous>  dextrose 50% Injectable 25 Gram(s) IV Push once  dextrose 50% Injectable 12.5 Gram(s) IV Push once  dextrose 50% Injectable 25 Gram(s) IV Push once  enoxaparin Injectable 40 milliGRAM(s) SubCutaneous every 24 hours  ertapenem  IVPB 1000 milliGRAM(s) IV Intermittent every 24 hours  ezetimibe 10 milliGRAM(s) Oral daily  glucagon  Injectable 1 milliGRAM(s) IntraMuscular once  influenza  Vaccine (HIGH DOSE) 0.5 milliLiter(s) IntraMuscular once  insulin glargine Injectable (LANTUS) 5 Unit(s) SubCutaneous at bedtime  insulin lispro (ADMELOG) corrective regimen sliding scale   SubCutaneous Before meals and at bedtime  lisinopril 20 milliGRAM(s) Oral every 24 hours  metoprolol succinate ER 50 milliGRAM(s) Oral daily  pregabalin 75 milliGRAM(s) Oral two times a day    MEDICATIONS  (PRN):  dextrose Oral Gel 15 Gram(s) Oral once PRN Blood Glucose LESS THAN 70 milliGRAM(s)/deciliter      FAMILY HISTORY:          ROS:  CONSTITUTIONAL: No fever, weight loss, or fatigue  EYES: No eye pain, visual disturbances, or discharge  ENMT:  No difficulty hearing, tinnitus, vertigo; No sinus or throat pain  NECK: No pain or stiffness  BREASTS: No pain, masses, or nipple discharge  RESPIRATORY: No cough, wheezing, chills or hemoptysis; No shortness of breath  CARDIOVASCULAR: No chest pain, palpitations, dizziness, or leg swelling  GASTROINTESTINAL: No abdominal or epigastric pain. No nausea, vomiting, or hematemesis; No diarrhea or constipation. No melena or hematochezia.  GENITOURINARY: No dysuria, frequency, hematuria, or incontinence  NEUROLOGICAL: No headaches, memory loss, loss of strength, numbness, or tremors  SKIN: No itching, burning, rashes, or lesions   LYMPH NODES: No enlarged glands  ENDOCRINE: No heat or cold intolerance; No hair loss  MUSCULOSKELETAL: No joint pain or swelling; No muscle, back, or extremity pain  PSYCHIATRIC: No depression, anxiety, mood swings, or difficulty sleeping  HEME/LYMPH: No easy bruising, or bleeding gums  ALLERGY AND IMMUNOLOGIC: No hives or eczema    PHYSICAL EXAM-      Vital Signs Last 24 Hrs  T(C): 36.5 (23 Jan 2025 13:06), Max: 37.1 (23 Jan 2025 05:38)  T(F): 97.7 (23 Jan 2025 13:06), Max: 98.8 (23 Jan 2025 05:38)  HR: 75 (23 Jan 2025 13:06) (75 - 87)  BP: 162/98 (23 Jan 2025 13:09) (136/74 - 185/102)  BP(mean): --  RR: 18 (23 Jan 2025 13:06) (18 - 18)  SpO2: 98% (23 Jan 2025 13:06) (91% - 99%)    Parameters below as of 23 Jan 2025 13:06  Patient On (Oxygen Delivery Method): room air        Constitutional: well developed, well nourished, no apparent distress, alert, oriented x 3.  Neck: Supple   Pulmonary: no respiratory distress, normal respiratory rhythm and effort, lungs are clear to auscultation/percussion. No CVA tenderness.  Cardiovascular: heart rate normal, normal sinus rhythm; no murmurs, gallops, rubs, heaves or thrills   Abdomen: soft, non-tender, +BS, no guarding/rebound/rigidity.  Vascular: Lower extremities are well perfused.   Extremities: WNL  Skin: There is small ulcer lower part of left leg, it is superficial, base is red and viable, no infection, no cellulitis.                             10.8   6.95  )-----------( 205      ( 23 Jan 2025 07:30 )             33.2     01-23    142  |  109[H]  |  11  ----------------------------<  117[H]  3.5   |  28  |  0.59    Ca    8.2[L]      23 Jan 2025 07:30    TPro  6.7  /  Alb  3.4  /  TBili  0.3  /  DBili  x   /  AST  16  /  ALT  20  /  AlkPhos  106  01-21      Radiology:

## 2025-01-23 NOTE — CONSULT NOTE ADULT - ASSESSMENT
Non-pressure chronic ulcer left leg, no infection.
Acute on CKD Stage 2  ESBL UTI  HTN with CKD      -ELDER prerenal, now Improved to baseline s/p IVF given  -Abx per ID  -PO hydration encouraged; may continue continuing gentle IVF for another 24 hrs, but patient is eating and orally hydrating well at bedside  -BP stable; okay to continue ACEi  -No additional renal work up for now  -Daily chem    D/W Family and Primary

## 2025-01-23 NOTE — CAREGIVER ENGAGEMENT NOTE - CAREGIVER OUTREACH NOTES - FREE TEXT
Pt gave permission for Nicanor to be privy to all her information.  CM educated patient and family on role of CM and transition to home. All verbalized understanding. CM discussed that patient will need home care infusion, all are in agreement with above. Louise Gomez is an RN and is willing to administer the IV infusion, and has done it in the past. Referral sent to [facility] and [facility] as requested. Midline to be placed today. Pt gave permission for Nicnaor to be privy to all her information.  CM educated patient and family on role of CM and transition to home. All verbalized understanding. CM discussed that patient will need home care infusion, all are in agreement with above. Louise Gomez is an RN and is willing to administer the IV infusion, and has done it in the past. Referral sent to [facility] and [facility].  PT had services with [facility] [phone]/ fax [phone] and would like to use them again. Midline to be placed today.

## 2025-01-23 NOTE — PROCEDURE NOTE - ADDITIONAL PROCEDURE DETAILS
20cm bard power midline inserted into patent right basilic vein under sterile conditions and ultrasound guidance.  Midline catheter can be accessed.

## 2025-01-23 NOTE — PHYSICAL THERAPY INITIAL EVALUATION ADULT - GAIT TRAINING, PT EVAL
Patient will ambulate >150 feet within 3-5 sessions to allow patient to ambulate community distances.

## 2025-01-23 NOTE — PHYSICAL THERAPY INITIAL EVALUATION ADULT - ADDITIONAL COMMENTS
Patient reports she lives with her granddtr and her grandchildren in private home with 3-4 steps to enter. Patient states she has been ambulating with single axis cane in the house and holding onto family members when ambulating in the community. Patient owns a single axis cane, rollator, and 3 rolling walkers.

## 2025-01-24 ENCOUNTER — TRANSCRIPTION ENCOUNTER (OUTPATIENT)
Age: 80
End: 2025-01-24

## 2025-01-24 VITALS
HEART RATE: 78 BPM | OXYGEN SATURATION: 95 % | DIASTOLIC BLOOD PRESSURE: 78 MMHG | SYSTOLIC BLOOD PRESSURE: 129 MMHG | TEMPERATURE: 98 F | RESPIRATION RATE: 18 BRPM

## 2025-01-24 LAB
ANION GAP SERPL CALC-SCNC: 8 MMOL/L — SIGNIFICANT CHANGE UP (ref 5–17)
BUN SERPL-MCNC: 12 MG/DL — SIGNIFICANT CHANGE UP (ref 7–23)
CALCIUM SERPL-MCNC: 8.6 MG/DL — SIGNIFICANT CHANGE UP (ref 8.5–10.1)
CHLORIDE SERPL-SCNC: 107 MMOL/L — SIGNIFICANT CHANGE UP (ref 96–108)
CO2 SERPL-SCNC: 29 MMOL/L — SIGNIFICANT CHANGE UP (ref 22–31)
CREAT SERPL-MCNC: 0.73 MG/DL — SIGNIFICANT CHANGE UP (ref 0.5–1.3)
EGFR: 84 ML/MIN/1.73M2 — SIGNIFICANT CHANGE UP
GLUCOSE BLDC GLUCOMTR-MCNC: 115 MG/DL — HIGH (ref 70–99)
GLUCOSE BLDC GLUCOMTR-MCNC: 117 MG/DL — HIGH (ref 70–99)
GLUCOSE SERPL-MCNC: 116 MG/DL — HIGH (ref 70–99)
HCT VFR BLD CALC: 35.8 % — SIGNIFICANT CHANGE UP (ref 34.5–45)
HGB BLD-MCNC: 11.5 G/DL — SIGNIFICANT CHANGE UP (ref 11.5–15.5)
MAGNESIUM SERPL-MCNC: 1.4 MG/DL — LOW (ref 1.6–2.6)
MCHC RBC-ENTMCNC: 26 PG — LOW (ref 27–34)
MCHC RBC-ENTMCNC: 32.1 G/DL — SIGNIFICANT CHANGE UP (ref 32–36)
MCV RBC AUTO: 80.8 FL — SIGNIFICANT CHANGE UP (ref 80–100)
NRBC # BLD: 0 /100 WBCS — SIGNIFICANT CHANGE UP (ref 0–0)
NRBC BLD-RTO: 0 /100 WBCS — SIGNIFICANT CHANGE UP (ref 0–0)
PHOSPHATE SERPL-MCNC: 3.4 MG/DL — SIGNIFICANT CHANGE UP (ref 2.5–4.5)
PLATELET # BLD AUTO: 227 K/UL — SIGNIFICANT CHANGE UP (ref 150–400)
POTASSIUM SERPL-MCNC: 3.7 MMOL/L — SIGNIFICANT CHANGE UP (ref 3.5–5.3)
POTASSIUM SERPL-SCNC: 3.7 MMOL/L — SIGNIFICANT CHANGE UP (ref 3.5–5.3)
RBC # BLD: 4.43 M/UL — SIGNIFICANT CHANGE UP (ref 3.8–5.2)
RBC # FLD: 16.6 % — HIGH (ref 10.3–14.5)
SODIUM SERPL-SCNC: 144 MMOL/L — SIGNIFICANT CHANGE UP (ref 135–145)
WBC # BLD: 7.1 K/UL — SIGNIFICANT CHANGE UP (ref 3.8–10.5)
WBC # FLD AUTO: 7.1 K/UL — SIGNIFICANT CHANGE UP (ref 3.8–10.5)

## 2025-01-24 PROCEDURE — 85025 COMPLETE CBC W/AUTO DIFF WBC: CPT

## 2025-01-24 PROCEDURE — 82962 GLUCOSE BLOOD TEST: CPT

## 2025-01-24 PROCEDURE — 97162 PT EVAL MOD COMPLEX 30 MIN: CPT

## 2025-01-24 PROCEDURE — 76937 US GUIDE VASCULAR ACCESS: CPT

## 2025-01-24 PROCEDURE — 85730 THROMBOPLASTIN TIME PARTIAL: CPT

## 2025-01-24 PROCEDURE — 36573 INSJ PICC RS&I 5 YR+: CPT

## 2025-01-24 PROCEDURE — 83735 ASSAY OF MAGNESIUM: CPT

## 2025-01-24 PROCEDURE — 71045 X-RAY EXAM CHEST 1 VIEW: CPT

## 2025-01-24 PROCEDURE — 83605 ASSAY OF LACTIC ACID: CPT

## 2025-01-24 PROCEDURE — 99285 EMERGENCY DEPT VISIT HI MDM: CPT | Mod: 25

## 2025-01-24 PROCEDURE — 85027 COMPLETE CBC AUTOMATED: CPT

## 2025-01-24 PROCEDURE — 80053 COMPREHEN METABOLIC PANEL: CPT

## 2025-01-24 PROCEDURE — G0545: CPT

## 2025-01-24 PROCEDURE — 84100 ASSAY OF PHOSPHORUS: CPT

## 2025-01-24 PROCEDURE — 81001 URINALYSIS AUTO W/SCOPE: CPT

## 2025-01-24 PROCEDURE — 99232 SBSQ HOSP IP/OBS MODERATE 35: CPT

## 2025-01-24 PROCEDURE — 85610 PROTHROMBIN TIME: CPT

## 2025-01-24 PROCEDURE — 36415 COLL VENOUS BLD VENIPUNCTURE: CPT

## 2025-01-24 PROCEDURE — C1751: CPT

## 2025-01-24 PROCEDURE — 87040 BLOOD CULTURE FOR BACTERIA: CPT

## 2025-01-24 PROCEDURE — 93005 ELECTROCARDIOGRAM TRACING: CPT

## 2025-01-24 PROCEDURE — 80048 BASIC METABOLIC PNL TOTAL CA: CPT

## 2025-01-24 RX ORDER — MAGNESIUM SULFATE 0.8 MEQ/ML
1 AMPUL (ML) INJECTION ONCE
Refills: 0 | Status: COMPLETED | OUTPATIENT
Start: 2025-01-24 | End: 2025-01-24

## 2025-01-24 RX ADMIN — Medication 1 APPLICATION(S): at 13:21

## 2025-01-24 RX ADMIN — ERTAPENEM SODIUM 120 MILLIGRAM(S): 1 INJECTION, POWDER, LYOPHILIZED, FOR SOLUTION INTRAMUSCULAR; INTRAVENOUS at 13:23

## 2025-01-24 RX ADMIN — Medication 100 GRAM(S): at 09:20

## 2025-01-24 RX ADMIN — ASPIRIN 81 MILLIGRAM(S): 81 TABLET, COATED ORAL at 12:22

## 2025-01-24 RX ADMIN — PREGABALIN CAPSULES, CV 75 MILLIGRAM(S): 225 CAPSULE ORAL at 05:45

## 2025-01-24 RX ADMIN — Medication 10 MILLIGRAM(S): at 12:21

## 2025-01-24 RX ADMIN — Medication 50 MILLIGRAM(S): at 05:44

## 2025-01-24 RX ADMIN — ENOXAPARIN SODIUM 40 MILLIGRAM(S): 100 INJECTION SUBCUTANEOUS at 05:45

## 2025-01-24 RX ADMIN — Medication 20 MILLIGRAM(S): at 05:45

## 2025-01-24 NOTE — PROGRESS NOTE ADULT - SUBJECTIVE AND OBJECTIVE BOX
[facility]  INFECTIOUS DISEASES   [address] [place] [address]  Tel: [phone]     Fax: [phone]  ========================================================  MD Michael Ivan Michelle, MD Shah, Kaushal, MD Sunjit, Jaspal, MD Sehrish Shahid, MD   ========================================================    [id number]  HORACE ARROYOARROSENDO     Follow up: UTI    No dysuria, frequency or hematuria, no fever, daughter at the bedside. No new complaint.     PAST MEDICAL & SURGICAL HISTORY:  DM (diabetes mellitus)  Benign essential HTN  H/O Bell's palsy  Gout  HLD (hyperlipidemia)  Psoriasis  No significant past surgical history    Social Hx: No current smoking, EtOH or drugs     FAMILY HISTORY:  Noncontributory     Allergies  No Known Allergies    MEDICATIONS  (STANDING):  aspirin enteric coated 81 milliGRAM(s) Oral daily  atorvastatin 40 milliGRAM(s) Oral at bedtime  dextrose 5%. 1000 milliLiter(s) (50 mL/Hr) IV Continuous <Continuous>  dextrose 5%. 1000 milliLiter(s) (100 mL/Hr) IV Continuous <Continuous>  dextrose 50% Injectable 25 Gram(s) IV Push once  dextrose 50% Injectable 12.5 Gram(s) IV Push once  dextrose 50% Injectable 25 Gram(s) IV Push once  enoxaparin Injectable 40 milliGRAM(s) SubCutaneous every 24 hours  ertapenem  IVPB 1000 milliGRAM(s) IV Intermittent every 24 hours  glucagon  Injectable 1 milliGRAM(s) IntraMuscular once  insulin glargine Injectable (LANTUS) 5 Unit(s) SubCutaneous at bedtime  insulin lispro (ADMELOG) corrective regimen sliding scale   SubCutaneous Before meals and at bedtime  lisinopril 20 milliGRAM(s) Oral daily  metoprolol succinate ER 50 milliGRAM(s) Oral daily  pregabalin 75 milliGRAM(s) Oral two times a day  sodium chloride 0.9%. 1000 milliLiter(s) (80 mL/Hr) IV Continuous <Continuous>     REVIEW OF SYSTEMS:  CONSTITUTIONAL:  No Fever or chills  HEENT:  No diplopia or blurred vision.  No sore throat or runny nose.  CARDIOVASCULAR:  No chest pain   RESPIRATORY:  No cough, shortness of breath, PND or orthopnea.  GASTROINTESTINAL:  No nausea, vomiting or diarrhea.  GENITOURINARY:  No dysuria, frequency or urgency. No Blood in urine  MUSCULOSKELETAL:  no joint aches, no muscle pain  SKIN:  No change in skin, hair or nails.    Physical Exam:  Vital Signs Last 24 Hrs  T(C): 36.5 (23 Jan 2025 13:06), Max: 37.1 (23 Jan 2025 05:38)  T(F): 97.7 (23 Jan 2025 13:06), Max: 98.8 (23 Jan 2025 05:38)  HR: 75 (23 Jan 2025 13:06) (75 - 87)  BP: 162/98 (23 Jan 2025 13:09) (136/74 - 185/102)  BP(mean): --  RR: 18 (23 Jan 2025 13:06) (18 - 18)  SpO2: 98% (23 Jan 2025 13:06) (91% - 99%)  Parameters below as of 23 Jan 2025 13:06  Patient On (Oxygen Delivery Method): room air  GEN: NAD  HEENT: normocephalic and atraumatic. EOMI. PERRL.    NECK: Supple.  No lymphadenopathy   LUNGS: Clear to auscultation.  HEART: Regular rate and rhythm w  ABDOMEN: Soft, nontender, and nondistended.   No CVA tenderness, no ko   EXTREMITIES: Without edema.  NEUROLOGIC: grossly intact.  PSYCHIATRIC: Appropriate affect .  SKIN: No rash     Labs:                        10.8   6.95  )-----------( 205      ( 23 Jan 2025 07:30 )             33.2     01-23    142  |  109[H]  |  11  ----------------------------<  117[H]  3.5   |  28  |  0.59    Ca    8.2[L]      23 Jan 2025 07:30    TPro  6.7  /  Alb  3.4  /  TBili  0.3  /  DBili  x   /  AST  16  /  ALT  20  /  AlkPhos  106  01-21    Urinalysis with Rflx Culture (collected 01-21-25 @ 23:22)    Culture - Blood (collected 01-21-25 @ 23:15)  Source: .Blood BLOOD  Preliminary Report (01-23-25 @ 06:01):    No growth at 24 hours    Culture - Blood (collected 01-21-25 @ 23:10)  Source: .Blood BLOOD  Preliminary Report (01-23-25 @ 06:01):    No growth at 24 hours    Culture - Urine (collected 09-19-24 @ 19:04)  Source: Clean Catch  Final Report (09-24-24 @ 17:39):    >100,000 CFU/ml Escherichia coli ESBL    50,000 - 99,000 CFU/mL Enterococcus faecalis  Organism: Escherichia coli ESBL  Enterococcus faecalis (09-24-24 @ 17:39)  Organism: Enterococcus faecalis (09-24-24 @ 17:39)    Sensitivities:      Method Type: MARIELA      -  Ampicillin: S <=2 Predicts results to ampicillin/sulbactam, amoxacillin-clavulanate and  piperacillin-tazobactam.      -  Ciprofloxacin: S <=1      -  Levofloxacin: S 1      -  Nitrofurantoin: S <=32 Should not be used to treat pyelonephritis.      -  Tetracycline: R >8      -  Vancomycin: S 2  Organism: Escherichia coli ESBL (09-24-24 @ 17:39)    Sensitivities:      Method Type: MARIELA      -  Ampicillin: R >16 These ampicillin results predict results for amoxicillin      -  Ampicillin/Sulbactam: I 16/8      -  Aztreonam: R >16      -  Cefazolin: R >16 For uncomplicated UTI with K. pneumoniae, E. coli, or P. mirablis: MARIELA <=16 is sensitive and MARIELA >=32 is resistant. This also predicts results for oral agents cefaclor, cefdinir, cefpodoxime, cefprozil, cefuroxime axetil, cephalexin and locarbef for uncomplicated UTI. Note that some isolates may be susceptible to these agents while testing resistant to cefazolin.      -  Cefepime: R 16      -  Ceftriaxone: R >32      -  Cefuroxime: R >16      -  Ciprofloxacin: R >2      -  Ertapenem: S <=0.5      -  Gentamicin: S <=2      -  Imipenem: S <=1      -  Levofloxacin: R >4      -  Meropenem: S <=1      -  Nitrofurantoin: S <=32 Should not be used to treat pyelonephritis      -  Piperacillin/Tazobactam: S <=8      -  Tobramycin: S <=2      -  Trimethoprim/Sulfamethoxazole: R >2/38    Urinalysis with Rflx Culture (collected 09-19-24 @ 19:04)    Culture - Blood (collected 09-19-24 @ 18:45)  Source: .Blood Blood-Peripheral  Final Report (09-25-24 @ 02:00):    No growth at 5 days    Culture - Blood (collected 09-19-24 @ 18:45)  Source: .Blood Blood-Peripheral  Final Report (09-25-24 @ 02:00):    No growth at 5 days    WBC Count: 6.95 K/uL (01-23-25 @ 07:30)  WBC Count: 7.34 K/uL (01-22-25 @ 13:40)  WBC Count: 9.71 K/uL (01-21-25 @ 23:22)    Creatinine: 0.59 mg/dL (01-23-25 @ 07:30)  Creatinine: 0.66 mg/dL (01-22-25 @ 13:40)  Creatinine: 0.82 mg/dL (01-21-25 @ 23:22)    All imaging and other data have been reviewed.  < from: Xray Chest 1 View- PORTABLE-Urgent (01.21.25 @ 22:22) >  IMPRESSION: No acute findings    Assessment and Plan:   80yo woman with PMH of HTN, DM2 and ESBL UTI was called by the urgent care to go to ED for another ESBL UTI from UCx taken few days ago.   Currently she is asymptomatic. Not taken any ABx.     UA with 10 WBC     # UTI   # ESBL infection     - Blood cultures NGTD  - UCx from outside with ESBL Ecoli  - Continue Ertapenem 1mg daily   - Midline has been ordered  - Can treat for total 5-7days total   - Can plan to d/c tomorrow     Will follow PRN.  Discussed with the daughter at the bedside.     Bhavna Huff MD  Division of Infectious Diseases   Please call ID service at [phone] with any question.    50 minutes spent on total encounter assessing patient, examination, chart review, counseling and coordinating care by the attending physician/nurse/care manager.  
[facility]  INFECTIOUS DISEASES   [address] [place] [address]  Tel: [phone]     Fax: [phone]  ========================================================  MD Michael Ivan Michelle, MD Shah, Kaushal, MD Sunjit, Jaspal, MD Sehrish Shahid, MD   ========================================================    [id number]  HORACE ARROYOARROSENDO     Follow up: UTI    No dysuria, frequency or hematuria, no fever, daughter at the bedside. No new complaint.     PAST MEDICAL & SURGICAL HISTORY:  DM (diabetes mellitus)  Benign essential HTN  H/O Bell's palsy  Gout  HLD (hyperlipidemia)  Psoriasis  No significant past surgical history    Social Hx: No current smoking, EtOH or drugs     FAMILY HISTORY:  Noncontributory     Allergies  No Known Allergies    MEDICATIONS  (STANDING):  aspirin enteric coated 81 milliGRAM(s) Oral daily  atorvastatin 40 milliGRAM(s) Oral at bedtime  dextrose 5%. 1000 milliLiter(s) (50 mL/Hr) IV Continuous <Continuous>  dextrose 5%. 1000 milliLiter(s) (100 mL/Hr) IV Continuous <Continuous>  dextrose 50% Injectable 25 Gram(s) IV Push once  dextrose 50% Injectable 12.5 Gram(s) IV Push once  dextrose 50% Injectable 25 Gram(s) IV Push once  enoxaparin Injectable 40 milliGRAM(s) SubCutaneous every 24 hours  ertapenem  IVPB 1000 milliGRAM(s) IV Intermittent every 24 hours  glucagon  Injectable 1 milliGRAM(s) IntraMuscular once  insulin glargine Injectable (LANTUS) 5 Unit(s) SubCutaneous at bedtime  insulin lispro (ADMELOG) corrective regimen sliding scale   SubCutaneous Before meals and at bedtime  lisinopril 20 milliGRAM(s) Oral daily  metoprolol succinate ER 50 milliGRAM(s) Oral daily  pregabalin 75 milliGRAM(s) Oral two times a day  sodium chloride 0.9%. 1000 milliLiter(s) (80 mL/Hr) IV Continuous <Continuous>     REVIEW OF SYSTEMS:  CONSTITUTIONAL:  No Fever or chills  HEENT:  No diplopia or blurred vision.  No sore throat or runny nose.  CARDIOVASCULAR:  No chest pain   RESPIRATORY:  No cough, shortness of breath, PND or orthopnea.  GASTROINTESTINAL:  No nausea, vomiting or diarrhea.  GENITOURINARY:  No dysuria, frequency or urgency. No Blood in urine  MUSCULOSKELETAL:  no joint aches, no muscle pain  SKIN:  No change in skin, hair or nails.    Physical Exam:  Vital Signs Last 24 Hrs  T(C): 36.8 (24 Jan 2025 11:53), Max: 37.2 (24 Jan 2025 05:38)  T(F): 98.2 (24 Jan 2025 11:53), Max: 99 (24 Jan 2025 05:38)  HR: 78 (24 Jan 2025 11:53) (75 - 83)  BP: 129/78 (24 Jan 2025 11:53) (129/78 - 162/98)  BP(mean): --  RR: 18 (24 Jan 2025 11:53) (18 - 20)  SpO2: 95% (24 Jan 2025 11:53) (94% - 98%)  Parameters below as of 24 Jan 2025 11:53  Patient On (Oxygen Delivery Method): room air  GEN: NAD  HEENT: normocephalic and atraumatic. EOMI. PERRL.    NECK: Supple.  No lymphadenopathy   LUNGS: Clear to auscultation.  HEART: Regular rate and rhythm w  ABDOMEN: Soft, nontender, and nondistended.   No CVA tenderness, no ko   EXTREMITIES: Without edema.  NEUROLOGIC: grossly intact.  PSYCHIATRIC: Appropriate affect .  SKIN: No rash     Labs:                        11.5   7.10  )-----------( 227      ( 24 Jan 2025 06:12 )             35.8     01-24    144  |  107  |  12  ----------------------------<  116[H]  3.7   |  29  |  0.73    Ca    8.6      24 Jan 2025 06:12  Phos  3.4     01-24  Mg     1.4     01-24    Urinalysis with Rflx Culture (collected 01-21-25 @ 23:22)    Culture - Blood (collected 01-21-25 @ 23:15)  Source: .Blood BLOOD  Preliminary Report (01-24-25 @ 06:00):    No growth at 48 Hours    Culture - Blood (collected 01-21-25 @ 23:10)  Source: .Blood BLOOD  Preliminary Report (01-24-25 @ 06:00):    No growth at 48 Hours    WBC Count: 7.10 K/uL (01-24-25 @ 06:12)  WBC Count: 6.95 K/uL (01-23-25 @ 07:30)  WBC Count: 7.34 K/uL (01-22-25 @ 13:40)  WBC Count: 9.71 K/uL (01-21-25 @ 23:22)    Creatinine: 0.73 mg/dL (01-24-25 @ 06:12)  Creatinine: 0.59 mg/dL (01-23-25 @ 07:30)  Creatinine: 0.66 mg/dL (01-22-25 @ 13:40)  Creatinine: 0.82 mg/dL (01-21-25 @ 23:22)    All imaging and other data have been reviewed.  < from: Xray Chest 1 View- PORTABLE-Urgent (01.21.25 @ 22:22) >  IMPRESSION: No acute findings    Assessment and Plan:   78yo woman with PMH of HTN, DM2 and ESBL UTI was called by the urgent care to go to ED for another ESBL UTI from UCx taken few days ago.   Currently she is asymptomatic. Not taken any ABx.     UA with 10 WBC     # UTI   # ESBL infection     - Blood cultures NGTD  - UCx from outside with ESBL Ecoli  - Continue Ertapenem 1mg daily   - Midline has been placed   - Can treat for total 7days  - Last day 1/27     Will sign off please call with any question.     Bhavna Huff MD  Division of Infectious Diseases   Please call ID service at [phone] with any question.    50 minutes spent on total encounter assessing patient, examination, chart review, counseling and coordinating care by the attending physician/nurse/care manager.  
Patient is a 79y old  Female who presents with a chief complaint of dysuria (22 Jan 2025 10:25)       HPI:  79-year-old female with past medical history of hypertension, hyperlipidemia, diabetes presents today due to UTI.  Patient reports that she was recently admitted to ESBL E. coli and required a PICC line for Invanz outpatient.  Patient reports that over the last week she has been experiencing dark urine along with dysuria.  Patient went to an urgent care in which she was called today about a urine culture noting ESBL E Coli with significant resistance.  Patient denies nausea, vomiting, flank pain, fever, abdominal pain, or any other complaints.   (22 Jan 2025 09:13)    Renal consulted for CKD and UTI. Feeling improvement and is urinating well.     no sob, no dizziness, eating okay, no N/V, daugther at bedside        PAST MEDICAL & SURGICAL HISTORY:  DM (diabetes mellitus)      Benign essential HTN      H/O Bell's palsy      Gout      HLD (hyperlipidemia)      Psoriasis      No significant past surgical history           FAMILY HISTORY:  NC    Social History:Non smoker    MEDICATIONS  (STANDING):  aspirin enteric coated 81 milliGRAM(s) Oral daily  atorvastatin 40 milliGRAM(s) Oral at bedtime  dextrose 5%. 1000 milliLiter(s) (50 mL/Hr) IV Continuous <Continuous>  dextrose 5%. 1000 milliLiter(s) (100 mL/Hr) IV Continuous <Continuous>  dextrose 50% Injectable 25 Gram(s) IV Push once  dextrose 50% Injectable 12.5 Gram(s) IV Push once  dextrose 50% Injectable 25 Gram(s) IV Push once  enoxaparin Injectable 40 milliGRAM(s) SubCutaneous every 24 hours  ertapenem  IVPB 1000 milliGRAM(s) IV Intermittent every 24 hours  glucagon  Injectable 1 milliGRAM(s) IntraMuscular once  insulin glargine Injectable (LANTUS) 5 Unit(s) SubCutaneous at bedtime  insulin lispro (ADMELOG) corrective regimen sliding scale   SubCutaneous Before meals and at bedtime  lisinopril 20 milliGRAM(s) Oral daily  metoprolol succinate ER 50 milliGRAM(s) Oral daily  pregabalin 75 milliGRAM(s) Oral two times a day  sodium chloride 0.9%. 1000 milliLiter(s) (80 mL/Hr) IV Continuous <Continuous>    MEDICATIONS  (PRN):  dextrose Oral Gel 15 Gram(s) Oral once PRN Blood Glucose LESS THAN 70 milliGRAM(s)/deciliter   Meds reviewed    Allergies    No Known Allergies    Intolerances         REVIEW OF SYSTEMS: as per hpi        ICU Vital Signs Last 24 Hrs  T(C): 36.8 (24 Jan 2025 11:53), Max: 37.2 (24 Jan 2025 05:38)  T(F): 98.2 (24 Jan 2025 11:53), Max: 99 (24 Jan 2025 05:38)  HR: 78 (24 Jan 2025 11:53) (76 - 83)  BP: 129/78 (24 Jan 2025 11:53) (129/78 - 162/87)  BP(mean): --  ABP: --  ABP(mean): --  RR: 18 (24 Jan 2025 11:53) (18 - 20)  SpO2: 95% (24 Jan 2025 11:53) (94% - 95%)    O2 Parameters below as of 24 Jan 2025 11:53  Patient On (Oxygen Delivery Method): room air              PHYSICAL EXAM:    GENERAL: NAD  HEAD:  Atraumatic, Normocephalic  EYES: EOMI, conjunctiva and sclera clear  ENMT: No Drainage from nares, No drainage from ears  NECK: Supple, neck  veins full  NERVOUS SYSTEM:  Awake and Alert  EXTREMITIES:  No Edema  SKIN: No rashes No obvious ecchymosis      LABS:                                     11.5   7.10  )-----------( 227      ( 24 Jan 2025 06:12 )             35.8     01-24    144  |  107  |  12  ----------------------------<  116[H]  3.7   |  29  |  0.73    Ca    8.6      24 Jan 2025 06:12  Phos  3.4     01-24  Mg     1.4     01-24        Urinalysis Basic - ( 24 Jan 2025 06:12 )    Color: x / Appearance: x / SG: x / pH: x  Gluc: 116 mg/dL / Ketone: x  / Bili: x / Urobili: x   Blood: x / Protein: x / Nitrite: x   Leuk Esterase: x / RBC: x / WBC x   Sq Epi: x / Non Sq Epi: x / Bacteria: x            
Patient is a 79y old  Female who presents with a chief complaint of dysuria (22 Jan 2025 10:25)       HPI:  79-year-old female with past medical history of hypertension, hyperlipidemia, diabetes presents today due to UTI.  Patient reports that she was recently admitted to ESBL E. coli and required a PICC line for Invanz outpatient.  Patient reports that over the last week she has been experiencing dark urine along with dysuria.  Patient went to an urgent care in which she was called today about a urine culture noting ESBL E Coli with significant resistance.  Patient denies nausea, vomiting, flank pain, fever, abdominal pain, or any other complaints.   (22 Jan 2025 09:13)    Renal consulted for CKD and UTI. Feeling improvement and is urinating well.     When I saw her today, did not have any new issues, she remains with elevated BP reads, no sob, no dizziness, eating okay, no N/V, daugther at bedside        PAST MEDICAL & SURGICAL HISTORY:  DM (diabetes mellitus)      Benign essential HTN      H/O Bell's palsy      Gout      HLD (hyperlipidemia)      Psoriasis      No significant past surgical history           FAMILY HISTORY:  NC    Social History:Non smoker    MEDICATIONS  (STANDING):  aspirin enteric coated 81 milliGRAM(s) Oral daily  atorvastatin 40 milliGRAM(s) Oral at bedtime  dextrose 5%. 1000 milliLiter(s) (50 mL/Hr) IV Continuous <Continuous>  dextrose 5%. 1000 milliLiter(s) (100 mL/Hr) IV Continuous <Continuous>  dextrose 50% Injectable 25 Gram(s) IV Push once  dextrose 50% Injectable 12.5 Gram(s) IV Push once  dextrose 50% Injectable 25 Gram(s) IV Push once  enoxaparin Injectable 40 milliGRAM(s) SubCutaneous every 24 hours  ertapenem  IVPB 1000 milliGRAM(s) IV Intermittent every 24 hours  glucagon  Injectable 1 milliGRAM(s) IntraMuscular once  insulin glargine Injectable (LANTUS) 5 Unit(s) SubCutaneous at bedtime  insulin lispro (ADMELOG) corrective regimen sliding scale   SubCutaneous Before meals and at bedtime  lisinopril 20 milliGRAM(s) Oral daily  metoprolol succinate ER 50 milliGRAM(s) Oral daily  pregabalin 75 milliGRAM(s) Oral two times a day  sodium chloride 0.9%. 1000 milliLiter(s) (80 mL/Hr) IV Continuous <Continuous>    MEDICATIONS  (PRN):  dextrose Oral Gel 15 Gram(s) Oral once PRN Blood Glucose LESS THAN 70 milliGRAM(s)/deciliter   Meds reviewed    Allergies    No Known Allergies    Intolerances         REVIEW OF SYSTEMS: as per hpi        Vital Signs Last 24 Hrs  T(C): 36.5 (23 Jan 2025 13:06), Max: 37.1 (23 Jan 2025 05:38)  T(F): 97.7 (23 Jan 2025 13:06), Max: 98.8 (23 Jan 2025 05:38)  HR: 75 (23 Jan 2025 13:06) (75 - 87)  BP: 162/98 (23 Jan 2025 13:09) (136/74 - 185/102)  BP(mean): --  RR: 18 (23 Jan 2025 13:06) (18 - 18)  SpO2: 98% (23 Jan 2025 13:06) (91% - 99%)    Parameters below as of 23 Jan 2025 13:06  Patient On (Oxygen Delivery Method): room air        PHYSICAL EXAM:    GENERAL: NAD  HEAD:  Atraumatic, Normocephalic  EYES: EOMI, conjunctiva and sclera clear  ENMT: No Drainage from nares, No drainage from ears  NECK: Supple, neck  veins full  NERVOUS SYSTEM:  Awake and Alert  CHEST/LUNG: Clear to auscultation bilaterally; No rales, rhonchi, wheezing, or rubs  HEART: Regular rate and rhythm; No murmurs, rubs, or gallops  ABDOMEN: Soft, Nontender, Nondistended; Bowel sounds present  EXTREMITIES:  No Edema  SKIN: No rashes No obvious ecchymosis      LABS:                          10.8   6.95  )-----------( 205      ( 23 Jan 2025 07:30 )             33.2     01-23    142  |  109[H]  |  11  ----------------------------<  117[H]  3.5   |  28  |  0.59    Ca    8.2[L]      23 Jan 2025 07:30    TPro  6.7  /  Alb  3.4  /  TBili  0.3  /  DBili  x   /  AST  16  /  ALT  20  /  AlkPhos  106  01-21    LIVER FUNCTIONS - ( 21 Jan 2025 23:22 )  Alb: 3.4 g/dL / Pro: 6.7 g/dL / ALK PHOS: 106 U/L / ALT: 20 U/L / AST: 16 U/L / GGT: x           PT/INR - ( 21 Jan 2025 23:22 )   PT: 11.6 sec;   INR: 0.99 ratio         PTT - ( 21 Jan 2025 23:22 )  PTT:33.2 sec  Urinalysis Basic - ( 23 Jan 2025 07:30 )    Color: x / Appearance: x / SG: x / pH: x  Gluc: 117 mg/dL / Ketone: x  / Bili: x / Urobili: x   Blood: x / Protein: x / Nitrite: x   Leuk Esterase: x / RBC: x / WBC x   Sq Epi: x / Non Sq Epi: x / Bacteria: x        
  Date of Service: 01-24-25 @ 11:45    Patient is a 79y old  Female who presents with a chief complaint of dysuria (24 Jan 2025 07:42)       INTERVAL HPI/OVERNIGHT EVENTS: stable, feels well, chart noted    MEDICATIONS  (STANDING):  aspirin enteric coated 81 milliGRAM(s) Oral daily  dextrose 5%. 1000 milliLiter(s) (50 mL/Hr) IV Continuous <Continuous>  dextrose 5%. 1000 milliLiter(s) (100 mL/Hr) IV Continuous <Continuous>  dextrose 50% Injectable 25 Gram(s) IV Push once  dextrose 50% Injectable 12.5 Gram(s) IV Push once  dextrose 50% Injectable 25 Gram(s) IV Push once  enoxaparin Injectable 40 milliGRAM(s) SubCutaneous every 24 hours  ertapenem  IVPB 1000 milliGRAM(s) IV Intermittent every 24 hours  ezetimibe 10 milliGRAM(s) Oral daily  glucagon  Injectable 1 milliGRAM(s) IntraMuscular once  influenza  Vaccine (HIGH DOSE) 0.5 milliLiter(s) IntraMuscular once  insulin glargine Injectable (LANTUS) 5 Unit(s) SubCutaneous at bedtime  insulin lispro (ADMELOG) corrective regimen sliding scale   SubCutaneous Before meals and at bedtime  lisinopril 20 milliGRAM(s) Oral two times a day  metoprolol succinate ER 50 milliGRAM(s) Oral daily  petrolatum white Ointment 1 Application(s) Topical daily  pregabalin 75 milliGRAM(s) Oral two times a day    MEDICATIONS  (PRN):  dextrose Oral Gel 15 Gram(s) Oral once PRN Blood Glucose LESS THAN 70 milliGRAM(s)/deciliter      Allergies    No Known Allergies    Intolerances        REVIEW OF SYSTEMS:  CONSTITUTIONAL: No fever, weight loss, or fatigue  EYES: No eye pain, visual disturbances  ENMT:  No difficulty hearing, tinnitus, vertigo; No sinus or throat pain  NECK: No pain or stiffness  RESPIRATORY: No cough, wheezing, chills or hemoptysis; No shortness of breath  CARDIOVASCULAR: No chest pain, palpitations, dizziness  GASTROINTESTINAL: No abdominal or epigastric pain. No nausea, vomiting, or hematemesis; No diarrhea or constipation. No melena or hematochezia.  GENITOURINARY: No dysuria, frequency, hematuria, or incontinence  NEUROLOGICAL: No headaches, memory loss, loss of strength, numbness, or tremors  SKIN: No itching, burning  LYMPH NODES: No enlarged glands  MUSCULOSKELETAL: No joint pain or swelling; No muscle, back, or extremity pain  PSYCHIATRIC: No depression, mood swings  HEME/LYMPH: No easy bruising, or bleeding gums  ALLERGY AND IMMUNOLOGIC: No hives    Vital Signs Last 24 Hrs  T(C): 37.2 (24 Jan 2025 05:38), Max: 37.2 (24 Jan 2025 05:38)  T(F): 99 (24 Jan 2025 05:38), Max: 99 (24 Jan 2025 05:38)  HR: 76 (24 Jan 2025 05:38) (75 - 83)  BP: 133/81 (24 Jan 2025 05:38) (133/81 - 162/98)  BP(mean): --  RR: 18 (24 Jan 2025 05:38) (18 - 20)  SpO2: 94% (24 Jan 2025 05:38) (94% - 98%)    Parameters below as of 24 Jan 2025 05:38  Patient On (Oxygen Delivery Method): room air        PHYSICAL EXAM:  GENERAL: NAD, well-groomed, well-developed  HEAD:  Atraumatic, Normocephalic  EYES: EOMI, PERRLA, conjunctiva and sclera clear  ENMT: No tonsillar erythema, exudates, or enlargement   NECK: Supple, No JVD  NERVOUS SYSTEM:  Alert & Oriented X3, Good concentration  CHEST/LUNG: Clear to auscultation bilaterally; No rales, rhonchi, wheezing  HEART: Regular rate and rhythm  ABDOMEN: Soft, Nontender, Nondistended; Bowel sounds present  EXTREMITIES:  2+ Peripheral Pulses   LYMPH: No lymphadenopathy noted  SKIN: No rashes     LABS:                        11.5   7.10  )-----------( 227      ( 24 Jan 2025 06:12 )             35.8     24 Jan 2025 06:12    144    |  107    |  12     ----------------------------<  116    3.7     |  29     |  0.73     Ca    8.6        24 Jan 2025 06:12  Phos  3.4       24 Jan 2025 06:12  Mg     1.4       24 Jan 2025 06:12        Urinalysis Basic - ( 24 Jan 2025 06:12 )    Color: x / Appearance: x / SG: x / pH: x  Gluc: 116 mg/dL / Ketone: x  / Bili: x / Urobili: x   Blood: x / Protein: x / Nitrite: x   Leuk Esterase: x / RBC: x / WBC x   Sq Epi: x / Non Sq Epi: x / Bacteria: x      CAPILLARY BLOOD GLUCOSE      POCT Blood Glucose.: 115 mg/dL (24 Jan 2025 07:59)  POCT Blood Glucose.: 118 mg/dL (23 Jan 2025 22:50)  POCT Blood Glucose.: 120 mg/dL (23 Jan 2025 17:22)  POCT Blood Glucose.: 94 mg/dL (23 Jan 2025 13:00)    blood culture --   No growth at 48 Hours   01-21 @ 23:15    blood culture --   No growth at 48 Hours   01-21 @ 23:10      urine culture --  01-21 @ 23:15  results   No growth at 48 Hours 01-21 @ 23:15  urine culture --  01-21 @ 23:10  results   No growth at 48 Hours 01-21 @ 23:10    wound with gram statin --    01-21 @ 23:15  organism  --   01-21 @ 23:15  specimen source .Blood BLOOD  01-21 @ 23:15  wound with gram statin --    01-21 @ 23:10  organism  --   01-21 @ 23:10  specimen source .Blood BLOOD  01-21 @ 23:10      RADIOLOGY & ADDITIONAL TESTS:      Consultant(s) Notes Reviewed:  [x ] YES  [ ] NO    Care Discussed with Consultants/Other Providers [ x] YES  [ ] NO    Advanced care planning discussed with patient and family, advanced care planning forms reviewed, discussed, and completed.  20 minutes spent.  
  Date of Service: 01-23-25 @ 12:42    Patient is a 79y old  Female who presents with a chief complaint of dysuria (22 Jan 2025 14:41)       INTERVAL HPI/OVERNIGHT EVENTS: feels well today    MEDICATIONS  (STANDING):  aspirin enteric coated 81 milliGRAM(s) Oral daily  dextrose 5%. 1000 milliLiter(s) (50 mL/Hr) IV Continuous <Continuous>  dextrose 5%. 1000 milliLiter(s) (100 mL/Hr) IV Continuous <Continuous>  dextrose 50% Injectable 25 Gram(s) IV Push once  dextrose 50% Injectable 12.5 Gram(s) IV Push once  dextrose 50% Injectable 25 Gram(s) IV Push once  enoxaparin Injectable 40 milliGRAM(s) SubCutaneous every 24 hours  ertapenem  IVPB 1000 milliGRAM(s) IV Intermittent every 24 hours  ezetimibe 10 milliGRAM(s) Oral daily  glucagon  Injectable 1 milliGRAM(s) IntraMuscular once  influenza  Vaccine (HIGH DOSE) 0.5 milliLiter(s) IntraMuscular once  insulin glargine Injectable (LANTUS) 5 Unit(s) SubCutaneous at bedtime  insulin lispro (ADMELOG) corrective regimen sliding scale   SubCutaneous Before meals and at bedtime  lisinopril 20 milliGRAM(s) Oral every 24 hours  metoprolol succinate ER 50 milliGRAM(s) Oral daily  pregabalin 75 milliGRAM(s) Oral two times a day    MEDICATIONS  (PRN):  dextrose Oral Gel 15 Gram(s) Oral once PRN Blood Glucose LESS THAN 70 milliGRAM(s)/deciliter      Allergies    No Known Allergies    Intolerances        REVIEW OF SYSTEMS:  CONSTITUTIONAL: No fever, weight loss, or fatigue  EYES: No eye pain, visual disturbances  ENMT:  No difficulty hearing, tinnitus, vertigo; No sinus or throat pain  NECK: No pain or stiffness  RESPIRATORY: No cough, wheezing, chills or hemoptysis; No shortness of breath  CARDIOVASCULAR: No chest pain, palpitations, dizziness  GASTROINTESTINAL: No abdominal or epigastric pain. No nausea, vomiting, or hematemesis; No diarrhea or constipation. No melena or hematochezia.  GENITOURINARY: No dysuria, frequency, hematuria, or incontinence  NEUROLOGICAL: No headaches, memory loss, loss of strength, numbness, or tremors  SKIN: No itching, burning  LYMPH NODES: No enlarged glands  MUSCULOSKELETAL: No joint pain or swelling; No muscle, back, or extremity pain  PSYCHIATRIC: No depression, mood swings  HEME/LYMPH: No easy bruising, or bleeding gums  ALLERGY AND IMMUNOLOGIC: No hives    Vital Signs Last 24 Hrs  T(C): 37.1 (23 Jan 2025 05:38), Max: 37.1 (23 Jan 2025 05:38)  T(F): 98.8 (23 Jan 2025 05:38), Max: 98.8 (23 Jan 2025 05:38)  HR: 81 (23 Jan 2025 05:38) (76 - 87)  BP: 150/83 (23 Jan 2025 05:38) (136/74 - 185/102)  BP(mean): --  RR: 18 (23 Jan 2025 05:38) (18 - 18)  SpO2: 91% (23 Jan 2025 05:38) (91% - 99%)    Parameters below as of 23 Jan 2025 05:38  Patient On (Oxygen Delivery Method): room air        PHYSICAL EXAM:  GENERAL: NAD, well-groomed, well-developed  HEAD:  Atraumatic, Normocephalic  EYES: EOMI, PERRLA, conjunctiva and sclera clear  ENMT: No tonsillar erythema, exudates, or enlargement   NECK: Supple, No JVD  NERVOUS SYSTEM:  Alert & Oriented X3, Good concentration  CHEST/LUNG: Clear to auscultation bilaterally; No rales, rhonchi, wheezing  HEART: Regular rate and rhythm  ABDOMEN: Soft, Nontender, Nondistended; Bowel sounds present  EXTREMITIES:  2+ Peripheral Pulses   LYMPH: No lymphadenopathy noted  SKIN: No rashes     LABS:                        10.8   6.95  )-----------( 205      ( 23 Jan 2025 07:30 )             33.2     23 Jan 2025 07:30    142    |  109    |  11     ----------------------------<  117    3.5     |  28     |  0.59     Ca    8.2        23 Jan 2025 07:30      PT/INR - ( 21 Jan 2025 23:22 )   PT: 11.6 sec;   INR: 0.99 ratio         PTT - ( 21 Jan 2025 23:22 )  PTT:33.2 sec  Urinalysis Basic - ( 23 Jan 2025 07:30 )    Color: x / Appearance: x / SG: x / pH: x  Gluc: 117 mg/dL / Ketone: x  / Bili: x / Urobili: x   Blood: x / Protein: x / Nitrite: x   Leuk Esterase: x / RBC: x / WBC x   Sq Epi: x / Non Sq Epi: x / Bacteria: x      CAPILLARY BLOOD GLUCOSE      POCT Blood Glucose.: 118 mg/dL (23 Jan 2025 08:44)  POCT Blood Glucose.: 126 mg/dL (22 Jan 2025 22:15)  POCT Blood Glucose.: 128 mg/dL (22 Jan 2025 17:30)    blood culture --   No growth at 24 hours   01-21 @ 23:15    blood culture --   No growth at 24 hours   01-21 @ 23:10      urine culture --  01-21 @ 23:15  results   No growth at 24 hours 01-21 @ 23:15  urine culture --  01-21 @ 23:10  results   No growth at 24 hours 01-21 @ 23:10    wound with gram statin --    01-21 @ 23:15  organism  --   01-21 @ 23:15  specimen source .Blood BLOOD  01-21 @ 23:15  wound with gram statin --    01-21 @ 23:10  organism  --   01-21 @ 23:10  specimen source .Blood BLOOD  01-21 @ 23:10      RADIOLOGY & ADDITIONAL TESTS:      Consultant(s) Notes Reviewed:  [ x] YES  [ ] NO    Care Discussed with Consultants/Other Providers [ x] YES  [ ] NO    Advanced care planning discussed with patient and family, advanced care planning forms reviewed, discussed, and completed.  20 minutes spent.

## 2025-01-24 NOTE — DISCHARGE NOTE PROVIDER - NSDCMRMEDTOKEN_GEN_ALL_CORE_FT
aspirin 81 mg oral capsule: 1 cap(s) orally once a day  Augmentin 500 mg-125 mg oral tablet: 1 tab(s) orally 3 times a day  Bimzelx Autoinjector 160 mg/mL subcutaneous injection: 2 milliliter(s) subcutaneously every 30 days  ertapenem 1 g injection: 1 gram(s) injectable once a day  ezetimibe 10 mg oral tablet: 1 tab(s) orally once a day  febuxostat 40 mg oral tablet: 1 tab(s) orally once a day  lisinopril 20 mg oral tablet: 1 tab(s) orally once a day  Lyrica 75 mg oral capsule: 1 cap(s) orally every 12 hours  metFORMIN 500 mg oral tablet, extended release: 1 tab(s) orally 4 times a day (before meals and at bedtime)  metoprolol succinate 50 mg oral capsule, extended release: 1 cap(s) orally once a day  Mounjaro 10 mg/0.5 mL subcutaneous solution: 10 milligram(s) subcutaneously every 7 days  pyridoxine: 1 tab(s) once a day  Toujeo Max SoloStar 300 units/mL subcutaneous solution: 12 unit(s) subcutaneous once a day  triamterene-hydrochlorothiazide 37.5 mg-25 mg oral capsule: 1 cap(s) orally once a day (at bedtime)  Vascepa 1 g oral capsule: 2 cap(s) orally once a day   aspirin 81 mg oral capsule: 1 cap(s) orally once a day  ertapenem 1 g injection: 1 gram(s) injectable once a day  ezetimibe 10 mg oral tablet: 1 tab(s) orally once a day  lisinopril 20 mg oral tablet: 1 tab(s) orally once a day  Lyrica 75 mg oral capsule: 1 cap(s) orally every 12 hours  metFORMIN 500 mg oral tablet, extended release: 1 tab(s) orally 4 times a day (before meals and at bedtime)  metoprolol succinate 50 mg oral capsule, extended release: 1 cap(s) orally once a day  Mounjaro 10 mg/0.5 mL subcutaneous solution: 10 milligram(s) subcutaneously every 7 days  triamterene-hydrochlorothiazide 37.5 mg-25 mg oral capsule: 1 cap(s) orally once a day (at bedtime)

## 2025-01-24 NOTE — CAREGIVER ENGAGEMENT NOTE - CAREGIVER OUTREACH NOTES - FREE TEXT
Discussed that per MD patient is stable for transition home today with IV infusion. Pt was accepted to [facility] for start of care tomorrow and [facility] for visiting nurse. Pt and family confirmed that [facility] already contacted them. Discharge Notice reviewed and explained to patient, pt verbalized understanding. Pt and family are in agreement with pt transitioning home today. Daughter who is an RN confirmed that she will be administering the IV antibiotics daily. Pt's other daughter at bedside will be transporting patient home. Discussed that per MD patient is stable for transition home today with IV infusion. Pt was accepted to [id number] for start of care tomorrow and [facility] for visiting nurse and Home PT. Pt and family confirmed that [facility] already contacted them. Discharge Notice reviewed and explained to patient, pt verbalized understanding. Pt and family are in agreement with pt transitioning home today. Daughter who is an RN confirmed that she will be administering the IV antibiotics daily. Pt's other daughter at bedside will be transporting patient home.

## 2025-01-24 NOTE — DISCHARGE NOTE PROVIDER - HOSPITAL COURSE
History of Present Illness:   79-year-old female with past medical history of hypertension, hyperlipidemia, diabetes presents today due to UTI.  Patient reports that she was recently admitted to ESBL E. coli and required a PICC line for Invanz outpatient.  Patient reports that over the last week she has been experiencing dark urine along with dysuria.  Patient went to an urgent care in which she was called today about a urine culture noting ESBL E Coli with significant resistance.  Patient denies nausea, vomiting, flank pain, fever, abdominal pain, or any other complaints.  Pt was seen by ID and picc line was recommended after negative blood cutlures. She will go home with invanz and picc line to complete 7 days of iv abx.  >35 minutes spent on discharge

## 2025-01-24 NOTE — PROGRESS NOTE ADULT - ASSESSMENT
Acute on CKD Stage 2  ESBL UTI  HTN with CKD      -ELDER prerenal, now Improved to baseline s/p IVF given  -SCR stable about baseline   -Abx per ID  -PO hydration encouraged; is now off ivf  -BP is elevated, will change lisinopril to 20 mg twice a day, give dose now and then later tonight.   -Monitor bp, if no improvement, may need further adjustments  -No additional renal work up for now  -Daily chem    D/W Family and RN  
Acute on CKD Stage 2  ESBL UTI  HTN with CKD      -ELDER prerenal, now Improved to baseline s/p IVF given  -SCR stable about baseline   -Abx per ID  -PO hydration encouraged; is now off ivf  -BP improved on new dose of lisinopril  -Monitor bp, if no improvement, may need further adjustments  -No additional renal work up for now  -Daily chem  -Mag repletion    D/W Family and RN

## 2025-01-24 NOTE — PROVIDER CONTACT NOTE (OTHER) - ACTION/TREATMENT ORDERED:
Lisinopril dose adjusted, dose given STAT as per orders.
Lisinopril to be given once per day on discharge as per MD Mai. Pt provided with medication teaching upon discharge.

## 2025-01-24 NOTE — PROGRESS NOTE ADULT - PROBLEM SELECTOR PLAN 2
stable bp  continue home meds  renal eval noted
stable bp  continue home meds  renal eval noted  dc ivf

## 2025-01-24 NOTE — PROGRESS NOTE ADULT - TIME BILLING
Extensive chart review  Extensive discussion with the patient and the family at the bedside  Emotional support and counseling provided regarding various aspects of the patient's care  Opportunity to ask questions was provided, all questions/concerns were addressed.

## 2025-01-24 NOTE — DISCHARGE NOTE NURSING/CASE MANAGEMENT/SOCIAL WORK - FINANCIAL ASSISTANCE
[facility] provides services at a reduced cost to those who are determined to be eligible through [facility]’s financial assistance program. Information regarding [facility]’s financial assistance program can be found by going to [email or web address] or by calling [phone].

## 2025-01-24 NOTE — PROVIDER CONTACT NOTE (OTHER) - ASSESSMENT
Pt asymptomatic, vitals as charted, denies any signs or symptoms of discomfort, resting comfortably in bed.
Pt asymptomatic, denies chest pain, SOB, or palpitations. Pt is without headache, dizziness, no complaints of discomfort. Pt appears to be resting in bed comfortably at this time.

## 2025-01-24 NOTE — PROVIDER CONTACT NOTE (OTHER) - SITUATION
MD Jaime and MD Mai made aware of BP in 170s on the electronic BP, 162/98 manually.
MD Mai made aware that pt taking lisinopril twice per day in the hospital, only ordered for once per day on discharge.

## 2025-01-24 NOTE — CAREGIVER ENGAGEMENT NOTE - CAREGIVER EDUCATION - TYPES DISCUSSED
Home Care Services/Transportation coordination
Discharge plan/DME/Home Care Services/Transportation coordination

## 2025-01-24 NOTE — PROGRESS NOTE ADULT - PROBLEM SELECTOR PROBLEM 4
Open wound of lower extremity without complication
Open wound of lower extremity without complication

## 2025-01-24 NOTE — PROGRESS NOTE ADULT - PROBLEM SELECTOR PLAN 1
pt with recurrent uti due to esbl - outpt labs and cultures noted  she is symptomatic  resume invanz  s/p picc   id noted  for dc today
pt with recurrent uti due to esbl - outpt labs and cultures noted  she is symptomatic  resume invanz  id eval noted - picc once cleared by id   fu inhouse cultures- neg thus far  trend labs  pain control

## 2025-01-24 NOTE — DISCHARGE NOTE PROVIDER - NSDCCPCAREPLAN_GEN_ALL_CORE_FT
PRINCIPAL DISCHARGE DIAGNOSIS  Diagnosis: Acute UTI  Assessment and Plan of Treatment: finish course of iv abx

## 2025-01-24 NOTE — PROGRESS NOTE ADULT - PROBLEM SELECTOR PROBLEM 1
UTI due to extended-spectrum beta lactamase (ESBL) producing Escherichia coli
UTI due to extended-spectrum beta lactamase (ESBL) producing Escherichia coli

## 2025-01-24 NOTE — DISCHARGE NOTE NURSING/CASE MANAGEMENT/SOCIAL WORK - PATIENT PORTAL LINK FT
You can access the FollowMyHealth Patient Portal offered by [facility] by registering at the following website: [email or web address]. By joining [employer]’s FollowMyHealth portal, you will also be able to view your health information using other applications (apps) compatible with our system.

## 2025-01-24 NOTE — PROGRESS NOTE ADULT - PROBLEM SELECTOR PLAN 3
stable  lantus with riss inhouse  strict glycemic control
stable  lantus with riss inhouse  strict glycemic control

## 2025-01-27 LAB
CULTURE RESULTS: SIGNIFICANT CHANGE UP
CULTURE RESULTS: SIGNIFICANT CHANGE UP
SPECIMEN SOURCE: SIGNIFICANT CHANGE UP
SPECIMEN SOURCE: SIGNIFICANT CHANGE UP

## 2025-01-31 ENCOUNTER — APPOINTMENT (OUTPATIENT)
Dept: WOUND CARE | Facility: HOSPITAL | Age: 80
End: 2025-01-31

## 2025-03-06 ENCOUNTER — INPATIENT (INPATIENT)
Facility: HOSPITAL | Age: 80
LOS: 1 days | Discharge: HOME | DRG: 682 | End: 2025-03-08
Attending: INTERNAL MEDICINE | Admitting: INTERNAL MEDICINE
Payer: MEDICARE

## 2025-03-06 VITALS
WEIGHT: 175.05 LBS | SYSTOLIC BLOOD PRESSURE: 111 MMHG | RESPIRATION RATE: 18 BRPM | OXYGEN SATURATION: 99 % | TEMPERATURE: 99 F | DIASTOLIC BLOOD PRESSURE: 75 MMHG | HEIGHT: 62.5 IN | HEART RATE: 122 BPM

## 2025-03-06 DIAGNOSIS — R41.82 ALTERED MENTAL STATUS, UNSPECIFIED: ICD-10-CM

## 2025-03-06 DIAGNOSIS — N17.9 ACUTE KIDNEY FAILURE, UNSPECIFIED: ICD-10-CM

## 2025-03-06 DIAGNOSIS — N39.0 URINARY TRACT INFECTION, SITE NOT SPECIFIED: ICD-10-CM

## 2025-03-06 DIAGNOSIS — E11.9 TYPE 2 DIABETES MELLITUS WITHOUT COMPLICATIONS: ICD-10-CM

## 2025-03-06 DIAGNOSIS — E86.0 DEHYDRATION: ICD-10-CM

## 2025-03-06 LAB
ALBUMIN SERPL ELPH-MCNC: 3 G/DL — LOW (ref 3.3–5)
ALP SERPL-CCNC: 98 U/L — SIGNIFICANT CHANGE UP (ref 40–120)
ALT FLD-CCNC: 21 U/L — SIGNIFICANT CHANGE UP (ref 12–78)
ANION GAP SERPL CALC-SCNC: 12 MMOL/L — SIGNIFICANT CHANGE UP (ref 5–17)
APPEARANCE UR: ABNORMAL
APTT BLD: 30.6 SEC — SIGNIFICANT CHANGE UP (ref 24.5–35.6)
AST SERPL-CCNC: 9 U/L — LOW (ref 15–37)
BASOPHILS # BLD AUTO: 0 K/UL — SIGNIFICANT CHANGE UP (ref 0–0.2)
BASOPHILS NFR BLD AUTO: 0 % — SIGNIFICANT CHANGE UP (ref 0–2)
BILIRUB SERPL-MCNC: 0.8 MG/DL — SIGNIFICANT CHANGE UP (ref 0.2–1.2)
BILIRUB UR-MCNC: NEGATIVE — SIGNIFICANT CHANGE UP
BUN SERPL-MCNC: 38 MG/DL — HIGH (ref 7–23)
CALCIUM SERPL-MCNC: 8.6 MG/DL — SIGNIFICANT CHANGE UP (ref 8.5–10.1)
CHLORIDE SERPL-SCNC: 102 MMOL/L — SIGNIFICANT CHANGE UP (ref 96–108)
CO2 SERPL-SCNC: 23 MMOL/L — SIGNIFICANT CHANGE UP (ref 22–31)
COLOR SPEC: SIGNIFICANT CHANGE UP
CREAT SERPL-MCNC: 1.3 MG/DL — SIGNIFICANT CHANGE UP (ref 0.5–1.3)
DIFF PNL FLD: NEGATIVE — SIGNIFICANT CHANGE UP
EGFR: 42 ML/MIN/1.73M2 — LOW
EGFR: 42 ML/MIN/1.73M2 — LOW
EOSINOPHIL # BLD AUTO: 0 K/UL — SIGNIFICANT CHANGE UP (ref 0–0.5)
EOSINOPHIL NFR BLD AUTO: 0 % — SIGNIFICANT CHANGE UP (ref 0–6)
FLUAV AG NPH QL: SIGNIFICANT CHANGE UP
FLUBV AG NPH QL: SIGNIFICANT CHANGE UP
GLUCOSE BLDC GLUCOMTR-MCNC: 126 MG/DL — HIGH (ref 70–99)
GLUCOSE SERPL-MCNC: 292 MG/DL — HIGH (ref 70–99)
GLUCOSE UR QL: NEGATIVE MG/DL — SIGNIFICANT CHANGE UP
HCT VFR BLD CALC: 40.8 % — SIGNIFICANT CHANGE UP (ref 34.5–45)
HGB BLD-MCNC: 13.2 G/DL — SIGNIFICANT CHANGE UP (ref 11.5–15.5)
INR BLD: 0.99 RATIO — SIGNIFICANT CHANGE UP (ref 0.85–1.16)
KETONES UR-MCNC: ABNORMAL MG/DL
LACTATE SERPL-SCNC: 1 MMOL/L — SIGNIFICANT CHANGE UP (ref 0.7–2)
LACTATE SERPL-SCNC: 2.6 MMOL/L — HIGH (ref 0.7–2)
LACTATE SERPL-SCNC: 3.6 MMOL/L — HIGH (ref 0.7–2)
LEUKOCYTE ESTERASE UR-ACNC: NEGATIVE — SIGNIFICANT CHANGE UP
LYMPHOCYTES # BLD AUTO: 0.34 K/UL — LOW (ref 1–3.3)
LYMPHOCYTES # BLD AUTO: 3 % — LOW (ref 13–44)
MCHC RBC-ENTMCNC: 26.6 PG — LOW (ref 27–34)
MCHC RBC-ENTMCNC: 32.4 G/DL — SIGNIFICANT CHANGE UP (ref 32–36)
MCV RBC AUTO: 82.1 FL — SIGNIFICANT CHANGE UP (ref 80–100)
MONOCYTES # BLD AUTO: 0.34 K/UL — SIGNIFICANT CHANGE UP (ref 0–0.9)
MONOCYTES NFR BLD AUTO: 3 % — SIGNIFICANT CHANGE UP (ref 2–14)
NEUTROPHILS # BLD AUTO: 10.74 K/UL — HIGH (ref 1.8–7.4)
NEUTROPHILS NFR BLD AUTO: 92 % — HIGH (ref 43–77)
NITRITE UR-MCNC: NEGATIVE — SIGNIFICANT CHANGE UP
NRBC BLD AUTO-RTO: SIGNIFICANT CHANGE UP /100 WBCS (ref 0–0)
PH UR: 5 — SIGNIFICANT CHANGE UP (ref 5–8)
PLATELET # BLD AUTO: 207 K/UL — SIGNIFICANT CHANGE UP (ref 150–400)
POTASSIUM SERPL-MCNC: 4.8 MMOL/L — SIGNIFICANT CHANGE UP (ref 3.5–5.3)
POTASSIUM SERPL-SCNC: 4.8 MMOL/L — SIGNIFICANT CHANGE UP (ref 3.5–5.3)
PROT SERPL-MCNC: 6.8 G/DL — SIGNIFICANT CHANGE UP (ref 6–8.3)
PROT UR-MCNC: 30 MG/DL
PROTHROM AB SERPL-ACNC: 11.7 SEC — SIGNIFICANT CHANGE UP (ref 9.9–13.4)
RBC # BLD: 4.97 M/UL — SIGNIFICANT CHANGE UP (ref 3.8–5.2)
RBC # FLD: 16.3 % — HIGH (ref 10.3–14.5)
RSV RNA NPH QL NAA+NON-PROBE: SIGNIFICANT CHANGE UP
SARS-COV-2 RNA SPEC QL NAA+PROBE: SIGNIFICANT CHANGE UP
SODIUM SERPL-SCNC: 137 MMOL/L — SIGNIFICANT CHANGE UP (ref 135–145)
SP GR SPEC: 1.02 — SIGNIFICANT CHANGE UP (ref 1–1.03)
TROPONIN I, HIGH SENSITIVITY RESULT: 72.3 NG/L — HIGH
TROPONIN I, HIGH SENSITIVITY RESULT: 78.7 NG/L — HIGH
TSH SERPL-MCNC: 0.45 UIU/ML — SIGNIFICANT CHANGE UP (ref 0.36–3.74)
UROBILINOGEN FLD QL: 1 MG/DL — SIGNIFICANT CHANGE UP (ref 0.2–1)
WBC # BLD: 11.43 K/UL — HIGH (ref 3.8–10.5)
WBC # FLD AUTO: 11.43 K/UL — HIGH (ref 3.8–10.5)

## 2025-03-06 PROCEDURE — 74177 CT ABD & PELVIS W/CONTRAST: CPT | Mod: 26

## 2025-03-06 PROCEDURE — 70450 CT HEAD/BRAIN W/O DYE: CPT | Mod: 26

## 2025-03-06 PROCEDURE — 99285 EMERGENCY DEPT VISIT HI MDM: CPT

## 2025-03-06 PROCEDURE — 71260 CT THORAX DX C+: CPT | Mod: 26

## 2025-03-06 PROCEDURE — 71045 X-RAY EXAM CHEST 1 VIEW: CPT | Mod: 26

## 2025-03-06 PROCEDURE — 93010 ELECTROCARDIOGRAM REPORT: CPT

## 2025-03-06 RX ORDER — ACETAMINOPHEN 500 MG/5ML
650 LIQUID (ML) ORAL EVERY 6 HOURS
Refills: 0 | Status: DISCONTINUED | OUTPATIENT
Start: 2025-03-06 | End: 2025-03-08

## 2025-03-06 RX ORDER — SODIUM CHLORIDE 9 G/1000ML
1000 INJECTION, SOLUTION INTRAVENOUS
Refills: 0 | Status: DISCONTINUED | OUTPATIENT
Start: 2025-03-06 | End: 2025-03-08

## 2025-03-06 RX ORDER — DEXTROSE 50 % IN WATER 50 %
25 SYRINGE (ML) INTRAVENOUS ONCE
Refills: 0 | Status: DISCONTINUED | OUTPATIENT
Start: 2025-03-06 | End: 2025-03-06

## 2025-03-06 RX ORDER — SODIUM CHLORIDE 9 G/1000ML
1000 INJECTION, SOLUTION INTRAVENOUS
Refills: 0 | Status: DISCONTINUED | OUTPATIENT
Start: 2025-03-06 | End: 2025-03-06

## 2025-03-06 RX ORDER — PIPERACILLIN-TAZO-DEXTROSE,ISO 3.375G/5
3.38 IV SOLUTION, PIGGYBACK PREMIX FROZEN(ML) INTRAVENOUS ONCE
Refills: 0 | Status: COMPLETED | OUTPATIENT
Start: 2025-03-06 | End: 2025-03-06

## 2025-03-06 RX ORDER — DEXTROSE 50 % IN WATER 50 %
25 SYRINGE (ML) INTRAVENOUS ONCE
Refills: 0 | Status: DISCONTINUED | OUTPATIENT
Start: 2025-03-06 | End: 2025-03-08

## 2025-03-06 RX ORDER — DEXTROSE 50 % IN WATER 50 %
12.5 SYRINGE (ML) INTRAVENOUS ONCE
Refills: 0 | Status: DISCONTINUED | OUTPATIENT
Start: 2025-03-06 | End: 2025-03-08

## 2025-03-06 RX ORDER — METOPROLOL SUCCINATE 50 MG/1
50 TABLET, EXTENDED RELEASE ORAL DAILY
Refills: 0 | Status: DISCONTINUED | OUTPATIENT
Start: 2025-03-06 | End: 2025-03-08

## 2025-03-06 RX ORDER — LISINOPRIL 5 MG/1
20 TABLET ORAL DAILY
Refills: 0 | Status: DISCONTINUED | OUTPATIENT
Start: 2025-03-06 | End: 2025-03-08

## 2025-03-06 RX ORDER — VANCOMYCIN HCL IN 5 % DEXTROSE 1.5G/250ML
1000 PLASTIC BAG, INJECTION (ML) INTRAVENOUS ONCE
Refills: 0 | Status: COMPLETED | OUTPATIENT
Start: 2025-03-06 | End: 2025-03-06

## 2025-03-06 RX ORDER — HEPARIN SODIUM 1000 [USP'U]/ML
5000 INJECTION INTRAVENOUS; SUBCUTANEOUS EVERY 12 HOURS
Refills: 0 | Status: DISCONTINUED | OUTPATIENT
Start: 2025-03-06 | End: 2025-03-08

## 2025-03-06 RX ORDER — PREGABALIN 50 MG/1
75 CAPSULE ORAL EVERY 12 HOURS
Refills: 0 | Status: DISCONTINUED | OUTPATIENT
Start: 2025-03-06 | End: 2025-03-08

## 2025-03-06 RX ORDER — ERTAPENEM SODIUM 1 G/1
1000 INJECTION, POWDER, LYOPHILIZED, FOR SOLUTION INTRAMUSCULAR; INTRAVENOUS ONCE
Refills: 0 | Status: DISCONTINUED | OUTPATIENT
Start: 2025-03-06 | End: 2025-03-06

## 2025-03-06 RX ORDER — ACETAMINOPHEN 500 MG/5ML
1000 LIQUID (ML) ORAL ONCE
Refills: 0 | Status: COMPLETED | OUTPATIENT
Start: 2025-03-06 | End: 2025-03-06

## 2025-03-06 RX ORDER — ASPIRIN 325 MG
81 TABLET ORAL DAILY
Refills: 0 | Status: DISCONTINUED | OUTPATIENT
Start: 2025-03-06 | End: 2025-03-08

## 2025-03-06 RX ORDER — INSULIN LISPRO 100 U/ML
3 INJECTION, SOLUTION INTRAVENOUS; SUBCUTANEOUS
Refills: 0 | Status: DISCONTINUED | OUTPATIENT
Start: 2025-03-06 | End: 2025-03-06

## 2025-03-06 RX ORDER — DEXTROSE 50 % IN WATER 50 %
15 SYRINGE (ML) INTRAVENOUS ONCE
Refills: 0 | Status: DISCONTINUED | OUTPATIENT
Start: 2025-03-06 | End: 2025-03-08

## 2025-03-06 RX ORDER — ERTAPENEM SODIUM 1 G/1
1000 INJECTION, POWDER, LYOPHILIZED, FOR SOLUTION INTRAMUSCULAR; INTRAVENOUS EVERY 24 HOURS
Refills: 0 | Status: DISCONTINUED | OUTPATIENT
Start: 2025-03-06 | End: 2025-03-07

## 2025-03-06 RX ORDER — INSULIN LISPRO 100 U/ML
3 INJECTION, SOLUTION INTRAVENOUS; SUBCUTANEOUS
Refills: 0 | Status: DISCONTINUED | OUTPATIENT
Start: 2025-03-06 | End: 2025-03-08

## 2025-03-06 RX ORDER — INSULIN LISPRO 100 U/ML
INJECTION, SOLUTION INTRAVENOUS; SUBCUTANEOUS
Refills: 0 | Status: DISCONTINUED | OUTPATIENT
Start: 2025-03-06 | End: 2025-03-08

## 2025-03-06 RX ORDER — DEXTROSE 50 % IN WATER 50 %
12.5 SYRINGE (ML) INTRAVENOUS ONCE
Refills: 0 | Status: DISCONTINUED | OUTPATIENT
Start: 2025-03-06 | End: 2025-03-06

## 2025-03-06 RX ORDER — PREGABALIN 50 MG/1
75 CAPSULE ORAL ONCE
Refills: 0 | Status: DISCONTINUED | OUTPATIENT
Start: 2025-03-06 | End: 2025-03-06

## 2025-03-06 RX ORDER — EZETIMIBE 10 MG/1
10 TABLET ORAL DAILY
Refills: 0 | Status: DISCONTINUED | OUTPATIENT
Start: 2025-03-06 | End: 2025-03-08

## 2025-03-06 RX ORDER — INSULIN LISPRO 100 U/ML
INJECTION, SOLUTION INTRAVENOUS; SUBCUTANEOUS AT BEDTIME
Refills: 0 | Status: DISCONTINUED | OUTPATIENT
Start: 2025-03-06 | End: 2025-03-08

## 2025-03-06 RX ORDER — GLUCAGON 3 MG/1
1 POWDER NASAL ONCE
Refills: 0 | Status: DISCONTINUED | OUTPATIENT
Start: 2025-03-06 | End: 2025-03-08

## 2025-03-06 RX ORDER — INSULIN LISPRO 100 U/ML
INJECTION, SOLUTION INTRAVENOUS; SUBCUTANEOUS
Refills: 0 | Status: DISCONTINUED | OUTPATIENT
Start: 2025-03-06 | End: 2025-03-06

## 2025-03-06 RX ORDER — INFLUENZA A VIRUS A/IDAHO/07/2018 (H1N1) ANTIGEN (MDCK CELL DERIVED, PROPIOLACTONE INACTIVATED, INFLUENZA A VIRUS A/INDIANA/08/2018 (H3N2) ANTIGEN (MDCK CELL DERIVED, PROPIOLACTONE INACTIVATED), INFLUENZA B VIRUS B/SINGAPORE/INFTT-16-0610/2016 ANTIGEN (MDCK CELL DERIVED, PROPIOLACTONE INACTIVATED), INFLUENZA B VIRUS B/IOWA/06/2017 ANTIGEN (MDCK CELL DERIVED, PROPIOLACTONE INACTIVATED) 15; 15; 15; 15 UG/.5ML; UG/.5ML; UG/.5ML; UG/.5ML
0.5 INJECTION, SUSPENSION INTRAMUSCULAR ONCE
Refills: 0 | Status: DISCONTINUED | OUTPATIENT
Start: 2025-03-06 | End: 2025-03-08

## 2025-03-06 RX ORDER — INSULIN GLARGINE-YFGN 100 [IU]/ML
10 INJECTION, SOLUTION SUBCUTANEOUS AT BEDTIME
Refills: 0 | Status: DISCONTINUED | OUTPATIENT
Start: 2025-03-06 | End: 2025-03-08

## 2025-03-06 RX ORDER — DEXTROSE 50 % IN WATER 50 %
15 SYRINGE (ML) INTRAVENOUS ONCE
Refills: 0 | Status: DISCONTINUED | OUTPATIENT
Start: 2025-03-06 | End: 2025-03-06

## 2025-03-06 RX ORDER — GLUCAGON 3 MG/1
1 POWDER NASAL ONCE
Refills: 0 | Status: DISCONTINUED | OUTPATIENT
Start: 2025-03-06 | End: 2025-03-06

## 2025-03-06 RX ADMIN — Medication 1600 MILLILITER(S): at 12:56

## 2025-03-06 RX ADMIN — Medication 400 MILLIGRAM(S): at 16:46

## 2025-03-06 RX ADMIN — ERTAPENEM SODIUM 100 MILLIGRAM(S): 1 INJECTION, POWDER, LYOPHILIZED, FOR SOLUTION INTRAMUSCULAR; INTRAVENOUS at 19:45

## 2025-03-06 RX ADMIN — INSULIN GLARGINE-YFGN 10 UNIT(S): 100 INJECTION, SOLUTION SUBCUTANEOUS at 23:12

## 2025-03-06 RX ADMIN — PREGABALIN 75 MILLIGRAM(S): 50 CAPSULE ORAL at 15:00

## 2025-03-06 RX ADMIN — Medication 250 MILLIGRAM(S): at 14:35

## 2025-03-06 RX ADMIN — Medication 200 GRAM(S): at 14:35

## 2025-03-07 LAB
A1C WITH ESTIMATED AVERAGE GLUCOSE RESULT: 7.2 % — HIGH (ref 4–5.6)
ANION GAP SERPL CALC-SCNC: 8 MMOL/L — SIGNIFICANT CHANGE UP (ref 5–17)
APPEARANCE UR: CLEAR — SIGNIFICANT CHANGE UP
BILIRUB UR-MCNC: NEGATIVE — SIGNIFICANT CHANGE UP
BUN SERPL-MCNC: 28 MG/DL — HIGH (ref 7–23)
CALCIUM SERPL-MCNC: 7.7 MG/DL — LOW (ref 8.5–10.1)
CHLORIDE SERPL-SCNC: 108 MMOL/L — SIGNIFICANT CHANGE UP (ref 96–108)
CO2 SERPL-SCNC: 26 MMOL/L — SIGNIFICANT CHANGE UP (ref 22–31)
COLOR SPEC: YELLOW — SIGNIFICANT CHANGE UP
CREAT SERPL-MCNC: 0.74 MG/DL — SIGNIFICANT CHANGE UP (ref 0.5–1.3)
CULTURE RESULTS: NO GROWTH — SIGNIFICANT CHANGE UP
DIFF PNL FLD: NEGATIVE — SIGNIFICANT CHANGE UP
EGFR: 83 ML/MIN/1.73M2 — SIGNIFICANT CHANGE UP
EGFR: 83 ML/MIN/1.73M2 — SIGNIFICANT CHANGE UP
ESTIMATED AVERAGE GLUCOSE: 160 MG/DL — HIGH (ref 68–114)
GLUCOSE BLDC GLUCOMTR-MCNC: 133 MG/DL — HIGH (ref 70–99)
GLUCOSE BLDC GLUCOMTR-MCNC: 154 MG/DL — HIGH (ref 70–99)
GLUCOSE BLDC GLUCOMTR-MCNC: 155 MG/DL — HIGH (ref 70–99)
GLUCOSE BLDC GLUCOMTR-MCNC: 156 MG/DL — HIGH (ref 70–99)
GLUCOSE SERPL-MCNC: 111 MG/DL — HIGH (ref 70–99)
GLUCOSE UR QL: NEGATIVE MG/DL — SIGNIFICANT CHANGE UP
HCT VFR BLD CALC: 34.2 % — LOW (ref 34.5–45)
HGB BLD-MCNC: 11 G/DL — LOW (ref 11.5–15.5)
KETONES UR-MCNC: NEGATIVE MG/DL — SIGNIFICANT CHANGE UP
LACTATE SERPL-SCNC: 0.8 MMOL/L — SIGNIFICANT CHANGE UP (ref 0.7–2)
LEUKOCYTE ESTERASE UR-ACNC: NEGATIVE — SIGNIFICANT CHANGE UP
MCHC RBC-ENTMCNC: 26.3 PG — LOW (ref 27–34)
MCHC RBC-ENTMCNC: 32.2 G/DL — SIGNIFICANT CHANGE UP (ref 32–36)
MCV RBC AUTO: 81.8 FL — SIGNIFICANT CHANGE UP (ref 80–100)
NITRITE UR-MCNC: NEGATIVE — SIGNIFICANT CHANGE UP
NRBC BLD AUTO-RTO: 0 /100 WBCS — SIGNIFICANT CHANGE UP (ref 0–0)
PH UR: 5 — SIGNIFICANT CHANGE UP (ref 5–8)
PLATELET # BLD AUTO: 174 K/UL — SIGNIFICANT CHANGE UP (ref 150–400)
POTASSIUM SERPL-MCNC: 4 MMOL/L — SIGNIFICANT CHANGE UP (ref 3.5–5.3)
POTASSIUM SERPL-SCNC: 4 MMOL/L — SIGNIFICANT CHANGE UP (ref 3.5–5.3)
PROT UR-MCNC: NEGATIVE MG/DL — SIGNIFICANT CHANGE UP
RBC # BLD: 4.18 M/UL — SIGNIFICANT CHANGE UP (ref 3.8–5.2)
RBC # FLD: 16.5 % — HIGH (ref 10.3–14.5)
SODIUM SERPL-SCNC: 142 MMOL/L — SIGNIFICANT CHANGE UP (ref 135–145)
SP GR SPEC: 1.02 — SIGNIFICANT CHANGE UP (ref 1–1.03)
SPECIMEN SOURCE: SIGNIFICANT CHANGE UP
TROPONIN I, HIGH SENSITIVITY RESULT: 80.9 NG/L — HIGH
UROBILINOGEN FLD QL: 1 MG/DL — SIGNIFICANT CHANGE UP (ref 0.2–1)
WBC # BLD: 5.14 K/UL — SIGNIFICANT CHANGE UP (ref 3.8–10.5)
WBC # FLD AUTO: 5.14 K/UL — SIGNIFICANT CHANGE UP (ref 3.8–10.5)

## 2025-03-07 PROCEDURE — G0545: CPT

## 2025-03-07 PROCEDURE — 99231 SBSQ HOSP IP/OBS SF/LOW 25: CPT

## 2025-03-07 PROCEDURE — 99222 1ST HOSP IP/OBS MODERATE 55: CPT

## 2025-03-07 PROCEDURE — 93010 ELECTROCARDIOGRAM REPORT: CPT

## 2025-03-07 RX ADMIN — INSULIN LISPRO 1: 100 INJECTION, SOLUTION INTRAVENOUS; SUBCUTANEOUS at 17:57

## 2025-03-07 RX ADMIN — INSULIN LISPRO 3 UNIT(S): 100 INJECTION, SOLUTION INTRAVENOUS; SUBCUTANEOUS at 12:53

## 2025-03-07 RX ADMIN — HEPARIN SODIUM 5000 UNIT(S): 1000 INJECTION INTRAVENOUS; SUBCUTANEOUS at 17:55

## 2025-03-07 RX ADMIN — PREGABALIN 75 MILLIGRAM(S): 50 CAPSULE ORAL at 05:51

## 2025-03-07 RX ADMIN — INSULIN LISPRO 1: 100 INJECTION, SOLUTION INTRAVENOUS; SUBCUTANEOUS at 12:53

## 2025-03-07 RX ADMIN — HEPARIN SODIUM 5000 UNIT(S): 1000 INJECTION INTRAVENOUS; SUBCUTANEOUS at 05:52

## 2025-03-07 RX ADMIN — INSULIN LISPRO 3 UNIT(S): 100 INJECTION, SOLUTION INTRAVENOUS; SUBCUTANEOUS at 09:01

## 2025-03-07 RX ADMIN — LISINOPRIL 20 MILLIGRAM(S): 5 TABLET ORAL at 05:52

## 2025-03-07 RX ADMIN — Medication 81 MILLIGRAM(S): at 12:55

## 2025-03-07 RX ADMIN — METOPROLOL SUCCINATE 50 MILLIGRAM(S): 50 TABLET, EXTENDED RELEASE ORAL at 05:52

## 2025-03-07 RX ADMIN — PREGABALIN 75 MILLIGRAM(S): 50 CAPSULE ORAL at 17:54

## 2025-03-07 RX ADMIN — INSULIN LISPRO 3 UNIT(S): 100 INJECTION, SOLUTION INTRAVENOUS; SUBCUTANEOUS at 17:56

## 2025-03-07 RX ADMIN — INSULIN GLARGINE-YFGN 10 UNIT(S): 100 INJECTION, SOLUTION SUBCUTANEOUS at 22:52

## 2025-03-07 RX ADMIN — EZETIMIBE 10 MILLIGRAM(S): 10 TABLET ORAL at 12:55

## 2025-03-08 VITALS
SYSTOLIC BLOOD PRESSURE: 114 MMHG | RESPIRATION RATE: 18 BRPM | TEMPERATURE: 98 F | HEART RATE: 76 BPM | OXYGEN SATURATION: 99 % | DIASTOLIC BLOOD PRESSURE: 77 MMHG

## 2025-03-08 LAB
ANION GAP SERPL CALC-SCNC: 6 MMOL/L — SIGNIFICANT CHANGE UP (ref 5–17)
BUN SERPL-MCNC: 22 MG/DL — SIGNIFICANT CHANGE UP (ref 7–23)
CALCIUM SERPL-MCNC: 8.8 MG/DL — SIGNIFICANT CHANGE UP (ref 8.5–10.1)
CHLORIDE SERPL-SCNC: 109 MMOL/L — HIGH (ref 96–108)
CO2 SERPL-SCNC: 24 MMOL/L — SIGNIFICANT CHANGE UP (ref 22–31)
CREAT ?TM UR-MCNC: 66 MG/DL — SIGNIFICANT CHANGE UP
CREAT SERPL-MCNC: 0.74 MG/DL — SIGNIFICANT CHANGE UP (ref 0.5–1.3)
EGFR: 83 ML/MIN/1.73M2 — SIGNIFICANT CHANGE UP
EGFR: 83 ML/MIN/1.73M2 — SIGNIFICANT CHANGE UP
GLUCOSE BLDC GLUCOMTR-MCNC: 147 MG/DL — HIGH (ref 70–99)
GLUCOSE BLDC GLUCOMTR-MCNC: 147 MG/DL — HIGH (ref 70–99)
GLUCOSE SERPL-MCNC: 141 MG/DL — HIGH (ref 70–99)
HCT VFR BLD CALC: 38.1 % — SIGNIFICANT CHANGE UP (ref 34.5–45)
HGB BLD-MCNC: 12.3 G/DL — SIGNIFICANT CHANGE UP (ref 11.5–15.5)
MCHC RBC-ENTMCNC: 26.7 PG — LOW (ref 27–34)
MCHC RBC-ENTMCNC: 32.3 G/DL — SIGNIFICANT CHANGE UP (ref 32–36)
MCV RBC AUTO: 82.6 FL — SIGNIFICANT CHANGE UP (ref 80–100)
NRBC BLD AUTO-RTO: 0 /100 WBCS — SIGNIFICANT CHANGE UP (ref 0–0)
OSMOLALITY UR: 499 MOSM/KG — SIGNIFICANT CHANGE UP (ref 50–1200)
PLATELET # BLD AUTO: 189 K/UL — SIGNIFICANT CHANGE UP (ref 150–400)
POTASSIUM SERPL-MCNC: 4 MMOL/L — SIGNIFICANT CHANGE UP (ref 3.5–5.3)
POTASSIUM SERPL-SCNC: 4 MMOL/L — SIGNIFICANT CHANGE UP (ref 3.5–5.3)
POTASSIUM UR-SCNC: 39.6 MMOL/L — SIGNIFICANT CHANGE UP
PROT ?TM UR-MCNC: 9 MG/DL — SIGNIFICANT CHANGE UP (ref 0–12)
PROT/CREAT UR-RTO: 0.1 RATIO — SIGNIFICANT CHANGE UP (ref 0–0.2)
RBC # BLD: 4.61 M/UL — SIGNIFICANT CHANGE UP (ref 3.8–5.2)
RBC # FLD: 16.3 % — HIGH (ref 10.3–14.5)
SODIUM SERPL-SCNC: 139 MMOL/L — SIGNIFICANT CHANGE UP (ref 135–145)
SODIUM UR-SCNC: 81 MMOL/L — SIGNIFICANT CHANGE UP
UUN UR-MCNC: 587 MG/DL — SIGNIFICANT CHANGE UP
WBC # BLD: 5.89 K/UL — SIGNIFICANT CHANGE UP (ref 3.8–10.5)
WBC # FLD AUTO: 5.89 K/UL — SIGNIFICANT CHANGE UP (ref 3.8–10.5)

## 2025-03-08 PROCEDURE — 85610 PROTHROMBIN TIME: CPT

## 2025-03-08 PROCEDURE — 82962 GLUCOSE BLOOD TEST: CPT

## 2025-03-08 PROCEDURE — 85730 THROMBOPLASTIN TIME PARTIAL: CPT

## 2025-03-08 PROCEDURE — 70450 CT HEAD/BRAIN W/O DYE: CPT | Mod: MC

## 2025-03-08 PROCEDURE — 74177 CT ABD & PELVIS W/CONTRAST: CPT | Mod: MC

## 2025-03-08 PROCEDURE — 71260 CT THORAX DX C+: CPT | Mod: MC

## 2025-03-08 PROCEDURE — 84300 ASSAY OF URINE SODIUM: CPT

## 2025-03-08 PROCEDURE — 85025 COMPLETE CBC W/AUTO DIFF WBC: CPT

## 2025-03-08 PROCEDURE — 84484 ASSAY OF TROPONIN QUANT: CPT

## 2025-03-08 PROCEDURE — 93005 ELECTROCARDIOGRAM TRACING: CPT

## 2025-03-08 PROCEDURE — 82570 ASSAY OF URINE CREATININE: CPT

## 2025-03-08 PROCEDURE — 81003 URINALYSIS AUTO W/O SCOPE: CPT

## 2025-03-08 PROCEDURE — 87637 SARSCOV2&INF A&B&RSV AMP PRB: CPT

## 2025-03-08 PROCEDURE — 84156 ASSAY OF PROTEIN URINE: CPT

## 2025-03-08 PROCEDURE — 96374 THER/PROPH/DIAG INJ IV PUSH: CPT

## 2025-03-08 PROCEDURE — 83935 ASSAY OF URINE OSMOLALITY: CPT

## 2025-03-08 PROCEDURE — 83036 HEMOGLOBIN GLYCOSYLATED A1C: CPT

## 2025-03-08 PROCEDURE — 87086 URINE CULTURE/COLONY COUNT: CPT

## 2025-03-08 PROCEDURE — 99285 EMERGENCY DEPT VISIT HI MDM: CPT | Mod: 25

## 2025-03-08 PROCEDURE — 84540 ASSAY OF URINE/UREA-N: CPT

## 2025-03-08 PROCEDURE — 84133 ASSAY OF URINE POTASSIUM: CPT

## 2025-03-08 PROCEDURE — 36415 COLL VENOUS BLD VENIPUNCTURE: CPT

## 2025-03-08 PROCEDURE — 85027 COMPLETE CBC AUTOMATED: CPT

## 2025-03-08 PROCEDURE — 84443 ASSAY THYROID STIM HORMONE: CPT

## 2025-03-08 PROCEDURE — 80048 BASIC METABOLIC PNL TOTAL CA: CPT

## 2025-03-08 PROCEDURE — 81001 URINALYSIS AUTO W/SCOPE: CPT

## 2025-03-08 PROCEDURE — 83605 ASSAY OF LACTIC ACID: CPT

## 2025-03-08 PROCEDURE — 71045 X-RAY EXAM CHEST 1 VIEW: CPT

## 2025-03-08 PROCEDURE — 87040 BLOOD CULTURE FOR BACTERIA: CPT

## 2025-03-08 PROCEDURE — 97162 PT EVAL MOD COMPLEX 30 MIN: CPT

## 2025-03-08 PROCEDURE — 96375 TX/PRO/DX INJ NEW DRUG ADDON: CPT

## 2025-03-08 PROCEDURE — 80053 COMPREHEN METABOLIC PANEL: CPT

## 2025-03-08 RX ADMIN — Medication 81 MILLIGRAM(S): at 11:48

## 2025-03-08 RX ADMIN — PREGABALIN 75 MILLIGRAM(S): 50 CAPSULE ORAL at 07:52

## 2025-03-08 RX ADMIN — HEPARIN SODIUM 5000 UNIT(S): 1000 INJECTION INTRAVENOUS; SUBCUTANEOUS at 07:52

## 2025-03-08 RX ADMIN — EZETIMIBE 10 MILLIGRAM(S): 10 TABLET ORAL at 11:48

## 2025-03-08 RX ADMIN — INSULIN LISPRO 3 UNIT(S): 100 INJECTION, SOLUTION INTRAVENOUS; SUBCUTANEOUS at 12:50

## 2025-03-08 RX ADMIN — METOPROLOL SUCCINATE 50 MILLIGRAM(S): 50 TABLET, EXTENDED RELEASE ORAL at 07:52

## 2025-03-08 RX ADMIN — INSULIN LISPRO 3 UNIT(S): 100 INJECTION, SOLUTION INTRAVENOUS; SUBCUTANEOUS at 08:44

## 2025-03-08 RX ADMIN — LISINOPRIL 20 MILLIGRAM(S): 5 TABLET ORAL at 07:51

## 2025-07-21 NOTE — ED ADULT TRIAGE NOTE - NS ED TRIAGE AVPU SCALE
Last OV was 7/18/25 Last filled on 6/7/24 # 180 with 3 refills    Alert-The patient is alert, awake and responds to voice. The patient is oriented to time, place, and person. The triage nurse is able to obtain subjective information.

## 2025-08-04 ENCOUNTER — NON-APPOINTMENT (OUTPATIENT)
Age: 80
End: 2025-08-04